# Patient Record
Sex: FEMALE | Race: WHITE | NOT HISPANIC OR LATINO | Employment: OTHER | URBAN - NONMETROPOLITAN AREA
[De-identification: names, ages, dates, MRNs, and addresses within clinical notes are randomized per-mention and may not be internally consistent; named-entity substitution may affect disease eponyms.]

---

## 2017-01-13 ENCOUNTER — HOSPITAL ENCOUNTER (OUTPATIENT)
Dept: GASTROENTEROLOGY | Facility: HOSPITAL | Age: 72
Setting detail: HOSPITAL OUTPATIENT SURGERY
Discharge: HOME | End: 2017-01-13
Attending: INTERNAL MEDICINE | Admitting: INTERNAL MEDICINE

## 2017-01-19 ENCOUNTER — OFFICE VISIT (OUTPATIENT)
Dept: GASTROENTEROLOGY | Facility: CLINIC | Age: 72
End: 2017-01-19

## 2017-01-19 VITALS
BODY MASS INDEX: 36.78 KG/M2 | HEART RATE: 84 BPM | DIASTOLIC BLOOD PRESSURE: 72 MMHG | HEIGHT: 67 IN | SYSTOLIC BLOOD PRESSURE: 150 MMHG | WEIGHT: 234.3 LBS

## 2017-01-19 DIAGNOSIS — R74.8 ELEVATED LIVER ENZYMES: ICD-10-CM

## 2017-01-19 DIAGNOSIS — E71.30 DISORDER OF FATTY-ACID METABOLISM: ICD-10-CM

## 2017-01-19 DIAGNOSIS — K76.0 FATTY (CHANGE OF) LIVER, NOT ELSEWHERE CLASSIFIED: ICD-10-CM

## 2017-01-19 DIAGNOSIS — K75.9 INFLAMMATORY LIVER DISEASE: ICD-10-CM

## 2017-01-19 DIAGNOSIS — K90.89 OTHER INTESTINAL MALABSORPTION: ICD-10-CM

## 2017-01-19 DIAGNOSIS — K21.00 GASTROESOPHAGEAL REFLUX DISEASE WITH ESOPHAGITIS: Primary | ICD-10-CM

## 2017-01-19 DIAGNOSIS — R19.7 DIARRHEA, UNSPECIFIED TYPE: ICD-10-CM

## 2017-01-19 LAB
CONVERTED (HISTORICAL) SPECIMEN DESCRIPTION 1: NORMAL
CONVERTED (HISTORICAL) SPECIMEN DESCRIPTION 2: NORMAL

## 2017-01-19 PROCEDURE — 99214 OFFICE O/P EST MOD 30 MIN: CPT | Performed by: PHYSICIAN ASSISTANT

## 2017-01-19 RX ORDER — OMEPRAZOLE 20 MG/1
20 TABLET, DELAYED RELEASE ORAL 2 TIMES DAILY
COMMUNITY
End: 2017-01-19

## 2017-01-20 RX ORDER — ESOMEPRAZOLE MAGNESIUM 40 MG/1
40 CAPSULE, DELAYED RELEASE ORAL DAILY
Qty: 30 CAPSULE | Refills: 5 | Status: SHIPPED | OUTPATIENT
Start: 2017-01-20 | End: 2017-02-14

## 2017-01-26 ENCOUNTER — PROCEDURE VISIT (OUTPATIENT)
Dept: FAMILY MEDICINE CLINIC | Facility: CLINIC | Age: 72
End: 2017-01-26

## 2017-01-26 VITALS
WEIGHT: 231 LBS | RESPIRATION RATE: 16 BRPM | SYSTOLIC BLOOD PRESSURE: 130 MMHG | BODY MASS INDEX: 36.26 KG/M2 | HEIGHT: 67 IN | DIASTOLIC BLOOD PRESSURE: 78 MMHG | HEART RATE: 85 BPM | TEMPERATURE: 98.6 F

## 2017-01-26 DIAGNOSIS — L91.8 SKIN TAG: Primary | ICD-10-CM

## 2017-01-26 PROCEDURE — 11200 RMVL SKIN TAGS UP TO&INC 15: CPT | Performed by: NURSE PRACTITIONER

## 2017-01-26 PROCEDURE — 99214 OFFICE O/P EST MOD 30 MIN: CPT | Performed by: NURSE PRACTITIONER

## 2017-01-26 RX ORDER — CITALOPRAM 40 MG/1
TABLET ORAL
Qty: 30 TABLET | Refills: 1 | Status: SHIPPED | OUTPATIENT
Start: 2017-01-26 | End: 2017-03-25 | Stop reason: SDUPTHER

## 2017-01-26 NOTE — MR AVS SNAPSHOT
Pita AGUIRRE Emry   1/26/2017 10:00 AM   Procedure visit    Dept Phone:  [phone]   Encounter #:  [id number]    Provider:  SHAHZAD Sanchez   Department:  [facility]                Your Full Care Plan              Where to Get Your Medications      These medications were sent to [address] [facility] [phone]  - [phone] [place] [address] [address], [facility] [address]     Phone:  [phone]     citalopram 40 MG tablet            Your Updated Medication List          This list is accurate as of: 1/26/17 12:04 PM.  Always use your most recent med list.                benzonatate 200 MG capsule   Commonly known as:  TESSALON       cetirizine-pseudoephedrine 5-120 MG per 12 hr tablet   Commonly known as:  ZyrTEC-D   Take 1 tablet by mouth 2 (Two) Times a Day.       citalopram 40 MG tablet   Commonly known as:  CeleXA   TAKE ONE TABLET BY MOUTH DAILY       CYCLOSPORINE OP       doxepin 25 MG capsule   Commonly known as:  SINEquan       esomeprazole 40 MG capsule   Commonly known as:  nexIUM   Take 1 capsule by mouth Daily.       fluticasone 50 MCG/BLIST diskus inhaler   Commonly known as:  FLOVENT DISKUS       hyoscyamine sulfate 0.125 MG tablet dispersible disintegrating tablet   Commonly known as:  ANASPAZ       IBUPROFEN PO       levothyroxine 100 MCG tablet   Commonly known as:  SYNTHROID, LEVOTHROID   TAKE ONE TABLET BY MOUTH DAILY       meloxicam 15 MG tablet   Commonly known as:  MOBIC   TAKE ONE TABLET BY MOUTH DAILY AS NEEDED FOR ARTHRITIS       ONDANSETRON HCL PO       pseudoephedrine 30 MG tablet   Commonly known as:  SUDAFED   Take 1 tablet by mouth 3 (three) times a day.       VENTOLIN  (90 BASE) MCG/ACT inhaler   Generic drug:  albuterol       vitamin D 58048 UNITS capsule capsule   Commonly known as:  ERGOCALCIFEROL               Instructions     None    "Patient Instructions History      Upcoming Appointments     Visit Type Date Time Department    IN OFFICE PROCEDURE 2017 10:00 AM [facility]    OFFICE VISIT 2017 11:20 AM [facility]    OFFICE VISIT 3/30/2017 11:00 AM [facility]      MyChart Signup     [facility] [employer] allows you to send messages to your doctor, view your test results, renew your prescriptions, schedule appointments, and more. To sign up, go to [employer] and click on the Sign Up Now link in the New User? box. Enter your [employer] Activation Code exactly as it appears below along with the last four digits of your Social Security Number and your Date of Birth () to complete the sign-up process. If you do not sign up before the expiration date, you must request a new code.    [employer] Activation Code: [email or web address]  Expires: 2017  2:51 PM    If you have questions, you can email [email or web address] or call [phone] to talk to our [employer] staff. Remember, [employer] is NOT to be used for urgent needs. For medical emergencies, dial 911.               Other Info from Your Visit           Your Appointments     2017 11:20 AM CST   Office Visit with Saulo Lemon PA-C   [facility] (--)    [address]  [facility] [address] [phone]           Arrive 15 minutes prior to appointment.            Mar 30, 2017 11:00 AM CDT   Office Visit with SHAHZAD Sanchez   [facility] (--)    [place] [address] [address] [address]  [facility] [address] [phone]           Arrive 15 minutes prior to appointment.              Allergies     Phenergan [Promethazine]        Reason for Visit     Skin Problem           Vital Signs     Blood Pressure Pulse Temperature Respirations Height Weight    130/78 85 98.6 °F (37 °C) 16 67\" (170.2 cm) 231 lb " (105 kg)    Body Mass Index Smoking Status                36.18 kg/m2 Former Smoker

## 2017-01-26 NOTE — PROGRESS NOTES
Subjective   Pita Hyatt is a 71 y.o. female.     Rash   This is a chronic (wants to have skin tags removed from her neck) problem. The current episode started more than 1 year ago. The problem has been waxing and waning (they tend to get caught on objects and will bleed) since onset. The affected locations include the neck. Rash characteristics: skin tags. She was exposed to nothing. Past treatments include nothing. The treatment provided no relief.        The following portions of the patient's history were reviewed and updated as appropriate: allergies, current medications, past family history, past medical history, past social history, past surgical history and problem list.    Review of Systems   Constitutional: Negative.    HENT: Negative.    Respiratory: Negative.    Cardiovascular: Negative.    Skin: Positive for rash.        3 small skin tags on her neck that need to be removed.   Neurological: Negative.    Psychiatric/Behavioral: Negative.        Objective   Physical Exam   Constitutional: She is oriented to person, place, and time. She appears well-developed and well-nourished. No distress.   HENT:   Head: Normocephalic.   Neck: Normal range of motion. Neck supple.   Cardiovascular: Normal rate, regular rhythm and normal heart sounds.  Exam reveals no friction rub.    No murmur heard.  Pulmonary/Chest: Effort normal and breath sounds normal. No respiratory distress. She has no wheezes. She has no rales.   Musculoskeletal: Normal range of motion.   Neurological: She is alert and oriented to person, place, and time.   Skin: Skin is warm and dry.   Has 3 small skin tags on the back of her neck.  There is no erythema or bleeding noted.   Psychiatric: She has a normal mood and affect. Thought content normal.   Nursing note and vitals reviewed.      Assessment/Plan   Georgia was seen today for skin problem.    Diagnoses and all orders for this visit:    Skin tag    Consent is signed and 3 small skin tags are  removed without diff.  She was informed to watch for s/s of infection, such as warmth, redness or drainage.  She should keep the area clean and dry for the next 2 or 3 days.

## 2017-01-27 RX ORDER — ERGOCALCIFEROL 1.25 MG/1
CAPSULE ORAL
Qty: 6 CAPSULE | Refills: 2 | Status: SHIPPED | OUTPATIENT
Start: 2017-01-27 | End: 2017-05-30 | Stop reason: SDUPTHER

## 2017-02-14 ENCOUNTER — OFFICE VISIT (OUTPATIENT)
Dept: GASTROENTEROLOGY | Facility: CLINIC | Age: 72
End: 2017-02-14

## 2017-02-14 VITALS
BODY MASS INDEX: 36.1 KG/M2 | HEART RATE: 93 BPM | DIASTOLIC BLOOD PRESSURE: 72 MMHG | WEIGHT: 230 LBS | SYSTOLIC BLOOD PRESSURE: 136 MMHG | HEIGHT: 67 IN

## 2017-02-14 DIAGNOSIS — R74.02 NONSPECIFIC ELEVATION OF LEVELS OF TRANSAMINASE AND LACTIC ACID DEHYDROGENASE (LDH): ICD-10-CM

## 2017-02-14 DIAGNOSIS — K76.0 FATTY (CHANGE OF) LIVER, NOT ELSEWHERE CLASSIFIED: ICD-10-CM

## 2017-02-14 DIAGNOSIS — K21.00 GASTROESOPHAGEAL REFLUX DISEASE WITH ESOPHAGITIS: Primary | ICD-10-CM

## 2017-02-14 DIAGNOSIS — R74.8 ELEVATED LIVER ENZYMES: ICD-10-CM

## 2017-02-14 DIAGNOSIS — R74.01 NONSPECIFIC ELEVATION OF LEVELS OF TRANSAMINASE AND LACTIC ACID DEHYDROGENASE (LDH): ICD-10-CM

## 2017-02-14 DIAGNOSIS — E71.30 DISORDER OF FATTY-ACID METABOLISM: ICD-10-CM

## 2017-02-14 PROCEDURE — 99213 OFFICE O/P EST LOW 20 MIN: CPT | Performed by: PHYSICIAN ASSISTANT

## 2017-02-14 RX ORDER — CHOLECALCIFEROL (VITAMIN D3) 50 MCG
20.6 CAPSULE ORAL DAILY
COMMUNITY
End: 2018-04-11

## 2017-02-14 NOTE — PROGRESS NOTES
Chief Complaint   Patient presents with   • Elevated Hepatic Enzymes   • Nausea   • Vomiting   • Diarrhea       ENDO PROCEDURE ORDERED:    Subjective    Pita Hyatt is a 71 y.o. female. she is here today for follow-up.    History of Present Illness    Patient seen on a recheck of her elevated liver enzymes, nausea, vomiting, diarrhea.  Last seen 1/19/17.  Patient again did not get her laboratories drawn, she claims again that she did not know she needed to.  I have again reminded her the initial reason to see her was because of abnormal liver enzymes.  She has been taking Nexium 40 mg daily as well as an over-the-counter Prilosec and states she's been doing much better.  She denies nausea, vomiting, dysphagia.  Bowels are moving without blood.  Weight is down 4.5 pounds since last visit.  Last EGD/colonoscopy showed esophagitis, gastritis, diverticulosis on 1/13/17.    A/P: GERD, abdominal pain patient will continue current regimen.  I recommended follow-up in 6 weeks, have again requested she have her laboratories drawn as recommended.  It is still not clear if she will willingly do so.  Further pending clinical course and the results of the above.     The following portions of the patient's history were reviewed and updated as appropriate:   Past Medical History   Diagnosis Date   • Acquired hypothyroidism    • Acute bronchitis    • Acute gastritis    • Acute maxillary sinusitis    • Acute maxillary sinusitis, unspecified    • Acute serous otitis media      R   • Acute sinusitis      Could be R mastoid    • Allergic rhinitis    • Anxiety state    • Chronic sinusitis    • Conjunctivitis    • Cough    • Degenerative joint disease of shoulder region    • Depressive disorder      improving   • Diarrhea    • Dysfunction of eustachian tube    • Epigastric pain    • Essential hypertension    • Female stress incontinence    • Forgetfulness    • Functional diarrhea    • Gynecological Examination    • Knee joint replaced  by other means    • Malaise and fatigue    • Menopause    • Nausea and vomiting    • Other Chest pain    • Polyp of colon    • Preop examination, unspecified    • Rhinitis medicamentosa    • Shoulder pain    • Skin tag- removal    • Subclinical hypothyroidism      Past Surgical History   Procedure Laterality Date   • Injection of medication       celestone (betamethasone) 12mg   • Endoscopy and colonoscopy       Diverticulum in sigmoid colon. 2 polyps in ascending colon & descending colon; removed by snare cautery polypectomy   • Injection of medication       Depo Medrol 2ml   • Injection of medication       Rocephin 1gm   • Replacement total knee     • Hysterectomy     • Colonoscopy  07/16/2012   • Colonoscopy  01/13/2017   • Upper gastrointestinal endoscopy  07/16/2012   • Upper gastrointestinal endoscopy  01/13/2017     Family History   Problem Relation Age of Onset   • Hypertension Other    • Stroke Other    • Diabetes Other    • Ulcers Other    • Cancer Other      OB History     No data available        Allergies   Allergen Reactions   • Phenergan [Promethazine]      Social History     Social History   • Marital status:      Spouse name: N/A   • Number of children: N/A   • Years of education: N/A     Social History Main Topics   • Smoking status: Former Smoker   • Smokeless tobacco: Never Used   • Alcohol use No   • Drug use: No   • Sexual activity: Not Asked     Other Topics Concern   • None     Social History Narrative       Current Outpatient Prescriptions:   •  albuterol (VENTOLIN HFA) 108 (90 BASE) MCG/ACT inhaler, Inhale 1 puff every 4 (four) hours as needed for wheezing., Disp: , Rfl:   •  benzonatate (TESSALON) 200 MG capsule, Take 200 mg by mouth 3 (three) times a day as needed for cough., Disp: , Rfl:   •  citalopram (CeleXA) 40 MG tablet, TAKE ONE TABLET BY MOUTH DAILY, Disp: 30 tablet, Rfl: 1  •  CYCLOSPORINE OP, Apply 1 drop to eye 3 (three) times a day as needed., Disp: , Rfl:   •  doxepin  "(SINEquan) 25 MG capsule, Take 25 mg by mouth 3 (three) times a day as needed for sleep., Disp: , Rfl:   •  fluticasone (FLOVENT DISKUS) 50 MCG/BLIST diskus inhaler, Inhale 2 puffs 1 (one) time daily., Disp: , Rfl:   •  hyoscyamine sulfate (ANASPAZ) 0.125 MG tablet dispersible disintegrating tablet, Take 125 mcg by mouth every 4 (four) hours as needed., Disp: , Rfl:   •  IBUPROFEN PO, Take  by mouth every night., Disp: , Rfl:   •  meloxicam (MOBIC) 15 MG tablet, TAKE ONE TABLET BY MOUTH DAILY AS NEEDED FOR ARTHRITIS, Disp: 30 tablet, Rfl: 2  •  Omeprazole Magnesium 20.6 (20 BASE) MG capsule delayed-release, Take 20.6 mg by mouth Daily., Disp: , Rfl:   •  ONDANSETRON HCL PO, Take 1 tablet by mouth every 4 (four) hours., Disp: , Rfl:   •  pseudoephedrine (SUDAFED) 30 MG tablet, Take 1 tablet by mouth 3 (three) times a day., Disp: 30 tablet, Rfl: 2  •  vitamin D (ERGOCALCIFEROL) 26032 UNITS capsule capsule, TAKE ONE CAPSULE BY MOUTH ONCE WEEKLY, Disp: 6 capsule, Rfl: 2  •  cetirizine-pseudoephedrine (ZyrTEC-D) 5-120 MG per 12 hr tablet, TAKE ONE TABLET BY MOUTH TWICE A DAY, Disp: 60 tablet, Rfl: 3  •  levothyroxine (SYNTHROID, LEVOTHROID) 100 MCG tablet, TAKE ONE TABLET BY MOUTH DAILY, Disp: 30 tablet, Rfl: 1  Review of Systems  Review of Systems       Objective      Visit Vitals   • /72 (BP Location: Left arm)   • Pulse 93   • Ht 67\" (170.2 cm)   • Wt 230 lb (104 kg)   • BMI 36.02 kg/m2     Physical Exam   Constitutional: She is oriented to person, place, and time. She appears well-developed and well-nourished. No distress.   HENT:   Head: Normocephalic and atraumatic.   Eyes: EOM are normal. Pupils are equal, round, and reactive to light.   Neck: Normal range of motion.   Cardiovascular: Normal rate, regular rhythm and normal heart sounds.    Pulmonary/Chest: Effort normal and breath sounds normal.   Abdominal: Soft. Bowel sounds are normal. She exhibits no shifting dullness, no distension, no abdominal bruit, " no ascites and no mass. There is no hepatosplenomegaly. There is tenderness. There is no rigidity, no rebound, no guarding and no CVA tenderness. No hernia. Hernia confirmed negative in the ventral area.   obese   Musculoskeletal: Normal range of motion.   Neurological: She is alert and oriented to person, place, and time.   Skin: Skin is warm and dry.   Psychiatric: She has a normal mood and affect. Her behavior is normal. Judgment and thought content normal.   Nursing note and vitals reviewed.    Hospital Outpatient Visit on 01/13/2017   Component Date Value Ref Range Status   • Spec Descr 1 01/13/2017 SPECIMEN(S): A ANTRAL BIOPSY   Final   • Specimen description 2 01/13/2017 SPECIMEN(S): B ESOPHAGEAL BIOPSY   Final     Assessment/Plan      1. Gastroesophageal reflux disease with esophagitis    2. Elevated liver enzymes    3. Fatty (change of) liver, not elsewhere classified     4. Disorder of fatty-acid metabolism     5. Nonspecific elevation of levels of transaminase and lactic acid dehydrogenase (LDH)     .   Georgia was seen today for elevated hepatic enzymes, nausea, vomiting and diarrhea.    Diagnoses and all orders for this visit:    Gastroesophageal reflux disease with esophagitis    Elevated liver enzymes  -     Hepatic Function Panel  -     LUCAS Fibrosure  -     Protime-INR  -     Hepatitis Panel, Acute    Fatty (change of) liver, not elsewhere classified   -     Hepatic Function Panel  -     LUCAS Fibrosure  -     Protime-INR  -     Hepatitis Panel, Acute    Disorder of fatty-acid metabolism   -     LUCAS Fibrosure    Nonspecific elevation of levels of transaminase and lactic acid dehydrogenase (LDH)   -     Hepatitis Panel, Acute        Orders placed during this encounter include:  Orders Placed This Encounter   Procedures   • Hepatic Function Panel   • LUCAS Fibrosure   • Protime-INR   • Hepatitis Panel, Acute       Medications prescribed:  No orders of the defined types were placed in this  encounter.    Discontinued Medications       Reason for Discontinue    esomeprazole (nexIUM) 40 MG capsule Cost of medication        Requested Prescriptions      No prescriptions requested or ordered in this encounter       Review and/or summary of lab tests, radiology, procedures, medications. Review and summary of old records and obtaining of history. The risks and benefits of my recommendations, as well as other treatment options were discussed with the patient today. Questions were answered.    Follow-up: Return in about 6 weeks (around 3/28/2017) for fasting labs prior.           This document has been electronically signed by Saulo Lemon PA-C on February 27, 2017 6:17 PM      Results for orders placed or performed during the hospital encounter of 01/13/17   Converted Surgical Pathology   Result Value Ref Range    Spec Descr 1 SPECIMEN(S): A ANTRAL BIOPSY     Specimen description 2 SPECIMEN(S): B ESOPHAGEAL BIOPSY    Results for orders placed or performed during the hospital encounter of 07/25/16   Iron profile   Result Value Ref Range    Iron 81 37 - 170 ug/dl    TIBC 357 265 - 497 ug/dl    Iron Saturation 22.7 15.0 - 50.0 %   Vitamin D 25 hydroxy   Result Value Ref Range    25 Hydroxy, Vitamin D 17.0 (L) 30.0 - 100.0 ng/ml   TSH   Result Value Ref Range    TSH 2.40 0.46 - 4.68 uIU/ml   Vitamin B12   Result Value Ref Range    Vitamin B-12 464 239 - 931 pg/ml   Comprehensive metabolic panel   Result Value Ref Range    Sodium 137 137 - 145 mmol/L    Potassium 4.4 3.5 - 5.1 mmol/L    Chloride 103 95 - 110 mmol/L    CO2 22 22 - 31 mmol/L    Anion Gap 12.0 5.0 - 15.0 mmol/L    Glucose 93 60 - 100 mg/dl    BUN 14 7 - 21 mg/dl    Creatinine 0.8 0.5 - 1.0 mg/dl    GFR MDRD Non African American 70 39 - 90 mL/min/1.73 sq.M    GFR MDRD African American 85 39 - 90 mL/min/1.73 sq.M    Calcium 9.4 8.4 - 10.2 mg/dl    Total Protein 6.4 6.3 - 8.6 gm/dl    Albumin 3.3 (L) 3.4 - 4.8 gm/dl    Total Bilirubin 0.7 0.2 - 1.3  mg/dl    Alkaline Phosphatase 112 38 - 126 U/L    AST (SGOT) 65 (H) 14 - 36 U/L    ALT (SGPT) 54 (H) 9 - 52 U/L   Results for orders placed or performed during the hospital encounter of 11/13/15   Vitamin D 25 hydroxy   Result Value Ref Range    25 Hydroxy, Vitamin D 28.5 (L) 30.0 - 100.0 ng/ml   TSH   Result Value Ref Range    TSH 1.45 0.46 - 4.68 uIU/ml   Vitamin B12   Result Value Ref Range    Vitamin B-12 459 239 - 931 pg/ml   Results for orders placed or performed in visit on 01/23/15   Allergens, Zone 5   Result Value Ref Range    D. farinae (dust mite) <0.10 <=0.34 kU/L    D. pteronyssinus (dust mite) <0.10 <=0.34 kU/L    IMMUCAP Score See Note     Bermuda Grass <0.10 <=0.34 kU/L    Portillo Grass <0.10 <=0.34 kU/L    IgE 91 <=214 kU/L    Alternaria tenuis <0.10 <=0.34 kU/L    Aspergillus fumigatus <0.10 <=0.34 kU/L    Hormodendrum hordei <0.10 <=0.34 kU/L    Penicillium chrysogen <0.10 <=0.34 kU/L    Birch <0.10 <=0.34 kU/L    [place], Pollen <0.10 <=0.34 kU/L    Maple/Box Elder <0.10 <=0.34 kU/L    Cottonseed <0.10 <=0.34 kU/L    Elm, American <0.10 <=0.34 kU/L    Culberson, Mountain <0.10 <=0.34 kU/L    Cord Tree <0.10 <=0.34 kU/L    Seagraves, White <0.10 <=0.34 kU/L    Ojnathan, White <0.10 <=0.34 kU/L    Pigweed, Rough/Common <0.10 <=0.34 kU/L    Russian Thistle <0.10 <=0.34 kU/L    Ragweed, Common/Short <0.10 <=0.34 kU/L    Cat Epithellium <0.10 <=0.34 kU/L    Dog Dander, IgE <0.10 <=0.34 kU/L    Pecan/Madison Tree <0.10 <=0.34 kU/L    Cockroach,Welsh <0.10 <=0.34 kU/L    Milk, Cow's <0.10 <=0.34 kU/L    Peanut <0.10 <=0.34 kU/L    Mouse Epithelial <0.10 <=0.34 kU/L    Mucor racemosus <0.10 <=0.34 kU/L    White Webster <0.10 <=0.34 kU/L    Sheep Sorrel <0.10 <=0.34 kU/L   Results for orders placed or performed in visit on 01/09/15   Rheumatoid factor   Result Value Ref Range    Rheumatoid Factor Quantitative Negative Negative   Results for orders placed or performed in visit on 01/09/15   TSH   Result Value Ref  Range    TSH 0.80 0.46 - 4.68 uIU/ml   Results for orders placed or performed in visit on 11/03/14   Hemoglobin A1c   Result Value Ref Range    Hemoglobin A1C 5.3 4.0 - 5.6 %TotHgb   Glucose, random   Result Value Ref Range    Glucose 118 (H) 60 - 100 mg/dl   Results for orders placed or performed in visit on 09/19/14   CBC and Differential   Result Value Ref Range    WBC 4.4 3.2 - 9.8 x1000/uL    RBC 4.29 3.77 - 5.16 bianka/mm3    Hemoglobin 13.0 12.0 - 15.5 gm/dl    Hematocrit 39.9 35.0 - 45.0 %    MCV 93.0 80.0 - 98.0 fl    MCH 30.3 26.0 - 34.0 pg    MCHC 32.6 31.4 - 36.0 gm/dl    RDW 13.7 11.5 - 14.5 %    Platelets 159 150 - 450 x1000/mm3    MPV 10.9 8.0 - 12.0 fl    Neutrophil Rel % 45.4 37.0 - 80.0 %    Lymphocyte Rel % 38.3 10.0 - 50.0 %    Monocyte Rel % 10.9 0.0 - 12.0 %    Eosinophil Rel % 4.3 0.0 - 7.0 %    Basophil Rel % 0.9 0.0 - 2.0 %    Immature Granulocyte Rel % 0.20 0.00 - 0.50 %    Neutrophils Absolute 1.99 (L) 2.00 - 8.60 x1000/uL    Lymphocytes Absolute 1.68 0.60 - 4.20 x1000/uL    Monocytes Absolute 0.48 0.00 - 0.90 x1000/uL    Eosinophils Absolute 0.19 0.00 - 0.70 x1000/uL    Basophils Absolute 0.04 0.00 - 0.20 x1000/uL    Immature Granulocytes Absolute 0.010 0.005 - 0.022 x1000/uL   Comprehensive metabolic panel   Result Value Ref Range    Sodium 144 137 - 145 mmol/L    Potassium 4.6 3.5 - 5.1 mmol/L    Chloride 103 95 - 110 mmol/L    CO2 29 22 - 31 mmol/L    Anion Gap 12.0 5.0 - 15.0 mmol/L    Glucose 114 (H) 60 - 100 mg/dl    BUN 18 7 - 21 mg/dl    Creatinine 0.9 0.5 - 1.0 mg/dl    GFR MDRD Non  62 39 - 90 mL/min/1.73 sq.M    GFR MDRD African American 75 39 - 90 mL/min/1.73 sq.M    Calcium 9.7 8.4 - 10.2 mg/dl    Total Protein 6.7 6.3 - 8.6 gm/dl    Albumin 3.7 3.4 - 4.8 gm/dl    Total Bilirubin 0.8 0.2 - 1.3 mg/dl    Alkaline Phosphatase 98 38 - 126 U/L    AST (SGOT) 60 (H) 14 - 36 U/L    ALT (SGPT) 53 (H) 9 - 52 U/L     *Note: Due to a large number of results and/or  encounters for the requested time period, some results have not been displayed. A complete set of results can be found in Results Review.       Some portions of this note have been dictated using voice recognition software and may contain errors and/or omissions.

## 2017-02-21 DIAGNOSIS — J30.9 ALLERGIC RHINITIS, UNSPECIFIED ALLERGIC RHINITIS TRIGGER, UNSPECIFIED RHINITIS SEASONALITY: ICD-10-CM

## 2017-02-21 RX ORDER — CETIRIZINE HYDROCHLORIDE, PSEUDOEPHEDRINE HYDROCHLORIDE 5; 120 MG/1; MG/1
TABLET, FILM COATED, EXTENDED RELEASE ORAL
Qty: 60 TABLET | Refills: 3 | Status: SHIPPED | OUTPATIENT
Start: 2017-02-21 | End: 2017-09-20 | Stop reason: SDUPTHER

## 2017-02-27 RX ORDER — LEVOTHYROXINE SODIUM 0.1 MG/1
TABLET ORAL
Qty: 30 TABLET | Refills: 1 | Status: SHIPPED | OUTPATIENT
Start: 2017-02-27 | End: 2017-05-06 | Stop reason: SDUPTHER

## 2017-03-27 RX ORDER — CITALOPRAM 40 MG/1
TABLET ORAL
Qty: 30 TABLET | Refills: 0 | Status: SHIPPED | OUTPATIENT
Start: 2017-03-27 | End: 2017-05-06 | Stop reason: SDUPTHER

## 2017-03-27 RX ORDER — MELOXICAM 15 MG/1
TABLET ORAL
Qty: 30 TABLET | Refills: 1 | Status: SHIPPED | OUTPATIENT
Start: 2017-03-27 | End: 2017-06-14 | Stop reason: SDUPTHER

## 2017-04-11 LAB
ALBUMIN SERPL-MCNC: 3.4 G/DL (ref 3.4–4.8)
ALP SERPL-CCNC: 124 U/L (ref 38–126)
ALT SERPL W P-5'-P-CCNC: 91 U/L (ref 9–52)
AST SERPL-CCNC: 118 U/L (ref 14–36)
BILIRUB CONJ SERPL-MCNC: 0 MG/DL (ref 0–0.3)
BILIRUB INDIRECT SERPL-MCNC: 0.3 MG/DL (ref 0–1.1)
BILIRUB SERPL-MCNC: 0.7 MG/DL (ref 0.2–1.3)
PROT SERPL-MCNC: 6.2 G/DL (ref 6.3–8.6)

## 2017-04-11 PROCEDURE — 85610 PROTHROMBIN TIME: CPT | Performed by: PHYSICIAN ASSISTANT

## 2017-04-11 PROCEDURE — 82247 BILIRUBIN TOTAL: CPT | Performed by: PHYSICIAN ASSISTANT

## 2017-04-11 PROCEDURE — 82465 ASSAY BLD/SERUM CHOLESTEROL: CPT | Performed by: PHYSICIAN ASSISTANT

## 2017-04-11 PROCEDURE — 84478 ASSAY OF TRIGLYCERIDES: CPT | Performed by: PHYSICIAN ASSISTANT

## 2017-04-11 PROCEDURE — 80076 HEPATIC FUNCTION PANEL: CPT | Performed by: PHYSICIAN ASSISTANT

## 2017-04-11 PROCEDURE — 84460 ALANINE AMINO (ALT) (SGPT): CPT | Performed by: PHYSICIAN ASSISTANT

## 2017-04-11 PROCEDURE — 80074 ACUTE HEPATITIS PANEL: CPT | Performed by: PHYSICIAN ASSISTANT

## 2017-04-11 PROCEDURE — 83010 ASSAY OF HAPTOGLOBIN QUANT: CPT | Performed by: PHYSICIAN ASSISTANT

## 2017-04-11 PROCEDURE — 82977 ASSAY OF GGT: CPT | Performed by: PHYSICIAN ASSISTANT

## 2017-04-11 PROCEDURE — 82947 ASSAY GLUCOSE BLOOD QUANT: CPT | Performed by: PHYSICIAN ASSISTANT

## 2017-04-11 PROCEDURE — 83883 ASSAY NEPHELOMETRY NOT SPEC: CPT | Performed by: PHYSICIAN ASSISTANT

## 2017-04-11 PROCEDURE — 84450 TRANSFERASE (AST) (SGOT): CPT | Performed by: PHYSICIAN ASSISTANT

## 2017-04-12 LAB
INR PPP: 1.15 (ref 0.8–1.2)
PROTHROMBIN TIME: 14.7 SECONDS (ref 11.1–15.3)

## 2017-04-14 LAB
A2 MACROGLOB SERPL-MCNC: 234 MG/DL (ref 110–276)
ALT SERPL W P-5'-P-CCNC: 88 IU/L (ref 0–40)
APO A-I SERPL-MCNC: 131 MG/DL (ref 116–209)
AST SERPL W P-5'-P-CCNC: 128 IU/L (ref 0–40)
BILIRUB SERPL-MCNC: 0.5 MG/DL (ref 0–1.2)
CHOLEST SERPL-MCNC: 175 MG/DL (ref 100–199)
FIBROSIS SCORING:: ABNORMAL
FIBROSIS STAGE SERPL QL: ABNORMAL
GGT SERPL-CCNC: 160 IU/L (ref 0–60)
GLUCOSE SERPL-MCNC: 124 MG/DL (ref 65–99)
HAPTOGLOB SERPL-MCNC: 131 MG/DL (ref 34–200)
HAV IGM SERPL QL IA: NEGATIVE
HBV CORE IGM SERPL QL IA: NEGATIVE
HBV SURFACE AG SERPL QL IA: NEGATIVE
HCV AB SER DONR QL: NEGATIVE
INTERPRETATIONS: (REFERENCE): ABNORMAL
LABORATORY COMMENT REPORT: ABNORMAL
LIMITATIONS: (REFERENCE): ABNORMAL
LIVER FIBR SCORE SERPL CALC.FIBROSURE: 0.58 (ref 0–0.21)
NASH GRADE (REFERENCE): ABNORMAL
NASH SCORE (REFERENCE): 0.5
NASH SCORING (REFERENCE): ABNORMAL
STEATOSIS GRADE (REFERENCE): ABNORMAL
STEATOSIS GRADING (REFERENCE): ABNORMAL
STEATOSIS SCORE (REFERENCE): 0.94 (ref 0–0.3)
TRIGL SERPL-MCNC: 116 MG/DL (ref 0–149)
WEIGHT: (REFERENCE): 230 LBS

## 2017-04-18 ENCOUNTER — OFFICE VISIT (OUTPATIENT)
Dept: FAMILY MEDICINE CLINIC | Facility: CLINIC | Age: 72
End: 2017-04-18

## 2017-04-18 VITALS
HEIGHT: 67 IN | TEMPERATURE: 97.7 F | HEART RATE: 78 BPM | RESPIRATION RATE: 18 BRPM | WEIGHT: 234.8 LBS | SYSTOLIC BLOOD PRESSURE: 164 MMHG | OXYGEN SATURATION: 98 % | DIASTOLIC BLOOD PRESSURE: 92 MMHG | BODY MASS INDEX: 36.85 KG/M2

## 2017-04-18 DIAGNOSIS — J01.01 ACUTE RECURRENT MAXILLARY SINUSITIS: Primary | ICD-10-CM

## 2017-04-18 DIAGNOSIS — N39.3 STRESS INCONTINENCE: ICD-10-CM

## 2017-04-18 PROCEDURE — 99214 OFFICE O/P EST MOD 30 MIN: CPT | Performed by: NURSE PRACTITIONER

## 2017-04-18 RX ORDER — CEFUROXIME AXETIL 500 MG/1
500 TABLET ORAL 2 TIMES DAILY
Qty: 20 TABLET | Refills: 0 | Status: SHIPPED | OUTPATIENT
Start: 2017-04-18 | End: 2017-06-06

## 2017-04-18 NOTE — PROGRESS NOTES
Subjective   Pita Hyatt is a 71 y.o. female.     HPI Comments: Here today with a sinus infection she has had for almost a week.  She has had discolored nasal drainage and more pain on the left that the right.  Has taken otc decongestant only.    She is also c/o urinary freq that is becoming a problem.  She says she has to wear a pad due to the incontinence and she is up about every 2 hrs through the night to urinate.  This is interfering with her quality of life.    Sinusitis   This is a recurrent problem. The current episode started in the past 7 days. The problem has been waxing and waning since onset. There has been no fever. Her pain is at a severity of 4/10. The pain is moderate. Associated symptoms include congestion, headaches, a hoarse voice and sinus pressure. Pertinent negatives include no chills, coughing, diaphoresis, ear pain, neck pain, shortness of breath, sneezing, sore throat or swollen glands. Past treatments include oral decongestants. The treatment provided mild relief.   Urinary Frequency    This is a chronic problem. The current episode started more than 1 month ago. The problem occurs every urination. The problem has been waxing and waning. Quality: has no pain. The patient is experiencing no pain. There has been no fever. She is not sexually active. There is no history of pyelonephritis. Associated symptoms include frequency. Pertinent negatives include no chills, discharge, flank pain, hematuria, hesitancy, nausea, possible pregnancy, sweats, urgency or vomiting. She has tried nothing for the symptoms. The treatment provided no relief. Her past medical history is significant for a urological procedure. There is no history of catheterization, kidney stones, recurrent UTIs, a single kidney or urinary stasis.        The following portions of the patient's history were reviewed and updated as appropriate: allergies, current medications, past family history, past medical history, past social  history, past surgical history and problem list.    Review of Systems   Constitutional: Negative for chills and diaphoresis.   HENT: Positive for congestion, hoarse voice and sinus pressure. Negative for ear pain, sneezing and sore throat.    Respiratory: Negative.  Negative for cough and shortness of breath.    Cardiovascular: Negative.    Gastrointestinal: Negative for nausea and vomiting.   Genitourinary: Positive for frequency. Negative for flank pain, hematuria, hesitancy and urgency.   Musculoskeletal: Negative.  Negative for neck pain.   Skin: Negative.    Neurological: Positive for headaches.   Psychiatric/Behavioral: Negative.        Objective   Physical Exam   Constitutional: She is oriented to person, place, and time. She appears well-developed and well-nourished. No distress.   HENT:   Head: Normocephalic.   Right Ear: Hearing, tympanic membrane, external ear and ear canal normal. Tympanic membrane is not injected.   Left Ear: Hearing, tympanic membrane, external ear and ear canal normal. Tympanic membrane is not injected.   Nose: Rhinorrhea and sinus tenderness present. Right sinus exhibits maxillary sinus tenderness. Right sinus exhibits no frontal sinus tenderness. Left sinus exhibits maxillary sinus tenderness. Left sinus exhibits no frontal sinus tenderness.   Mouth/Throat: Oropharyngeal exudate present.   Eyes: Pupils are equal, round, and reactive to light.   Neck: Normal range of motion. Neck supple. No thyromegaly present.   Cardiovascular: Normal rate, regular rhythm and normal heart sounds.  Exam reveals no friction rub.    No murmur heard.  Pulmonary/Chest: Effort normal and breath sounds normal. No respiratory distress. She has no wheezes. She has no rales.   Abdominal: Soft.   Musculoskeletal: Normal range of motion.   Neurological: She is alert and oriented to person, place, and time.   Skin: Skin is warm and dry.   Psychiatric: She has a normal mood and affect. Thought content normal.    Nursing note and vitals reviewed.      Assessment/Plan   Georgia was seen today for follow-up.    Diagnoses and all orders for this visit:    Acute recurrent maxillary sinusitis  -     cefuroxime (CEFTIN) 500 MG tablet; Take 1 tablet by mouth 2 (Two) Times a Day.    Stress incontinence      She wants to be referred to urology for the stress incont.  Also she has zyrtec d at home that she will take along with the ceftin.

## 2017-05-09 RX ORDER — LEVOTHYROXINE SODIUM 0.1 MG/1
TABLET ORAL
Qty: 30 TABLET | Refills: 0 | Status: SHIPPED | OUTPATIENT
Start: 2017-05-09 | End: 2017-06-14 | Stop reason: SDUPTHER

## 2017-05-09 RX ORDER — CITALOPRAM 40 MG/1
TABLET ORAL
Qty: 30 TABLET | Refills: 0 | Status: SHIPPED | OUTPATIENT
Start: 2017-05-09 | End: 2017-06-14 | Stop reason: SDUPTHER

## 2017-05-30 RX ORDER — ERGOCALCIFEROL 1.25 MG/1
CAPSULE ORAL
Qty: 6 CAPSULE | Refills: 1 | Status: SHIPPED | OUTPATIENT
Start: 2017-05-30 | End: 2017-08-26 | Stop reason: SDUPTHER

## 2017-06-06 ENCOUNTER — OFFICE VISIT (OUTPATIENT)
Dept: GASTROENTEROLOGY | Facility: CLINIC | Age: 72
End: 2017-06-06

## 2017-06-06 VITALS
BODY MASS INDEX: 35.63 KG/M2 | DIASTOLIC BLOOD PRESSURE: 72 MMHG | HEIGHT: 67 IN | WEIGHT: 227 LBS | HEART RATE: 88 BPM | SYSTOLIC BLOOD PRESSURE: 118 MMHG

## 2017-06-06 DIAGNOSIS — K74.00 HEPATIC FIBROSIS: ICD-10-CM

## 2017-06-06 DIAGNOSIS — R74.8 ELEVATED LIVER ENZYMES: Primary | ICD-10-CM

## 2017-06-06 DIAGNOSIS — K21.00 GASTROESOPHAGEAL REFLUX DISEASE WITH ESOPHAGITIS: ICD-10-CM

## 2017-06-06 DIAGNOSIS — E74.89 OTHER SPECIFIED DISORDERS OF CARBOHYDRATE METABOLISM (HCC): ICD-10-CM

## 2017-06-06 PROCEDURE — 99213 OFFICE O/P EST LOW 20 MIN: CPT | Performed by: PHYSICIAN ASSISTANT

## 2017-06-06 NOTE — PROGRESS NOTES
Chief Complaint   Patient presents with   • Heartburn   • Elevated Hepatic Enzymes   • Fatty Liver       ENDO PROCEDURE ORDERED:    Kasia Hyatt is a 72 y.o. female. she is here today for follow-up.    History of Present Illness    Patient seen on a recheck of her GERD, elevated liver enzymes, fatty liver.  Last seen 2/14/17.  Patient states she's doing well with the over-the-counter Prilosec.  She denied nausea, vomiting, dysphagia.  She is on Levsin for her IBS symptoms.  Weight is down 3 pounds since last visit.  Last EGD/colonoscopy on 1/13/17 showed esophagitis, gastritis, diverticulosis.    Laboratory on for/11/17 showed negative hepatitis diagnostic panel.  INR 1.15.  Richard fibro-sure 0.58/F3, steatosis 0.94/S3, 0.25/N1.  Within the panel GGT was 160, , ALT 88, glucose 124.  LFT showed a protein 6.2, , ALT 91, otherwise normal.    A/P: Persistent elevation in liver enzymes secondary to fatty liver.  Encouraged continued dietary modification and significant weight loss attempts.  I am concerned about her elevated blood sugars, she may be developing diabetes.  We'll plan follow-up in 3 months with LFTs, INR, GGT, A1c.  Otherwise she'll continue on the Prilosec and Levsin for her GERD and IBS symptoms.  We'll need to be reviewed her record, I could not find any imaging of her liver.     The following portions of the patient's history were reviewed and updated as appropriate:   Past Medical History:   Diagnosis Date   • Acquired hypothyroidism    • Acute bronchitis    • Acute gastritis    • Acute maxillary sinusitis    • Acute maxillary sinusitis, unspecified    • Acute serous otitis media     R   • Acute sinusitis     Could be R mastoid    • Allergic rhinitis    • Anxiety state    • Chronic sinusitis    • Conjunctivitis    • Cough    • Degenerative joint disease of shoulder region    • Depressive disorder     improving   • Diarrhea    • Dysfunction of eustachian tube    • Epigastric  pain    • Essential hypertension    • Female stress incontinence    • Forgetfulness    • Functional diarrhea    • Gynecological Examination    • Knee joint replaced by other means    • Malaise and fatigue    • Menopause    • Nausea and vomiting    • Other Chest pain    • Polyp of colon    • Preop examination, unspecified    • Rhinitis medicamentosa    • Shoulder pain    • Skin tag- removal    • Subclinical hypothyroidism      Past Surgical History:   Procedure Laterality Date   • COLONOSCOPY  07/16/2012   • COLONOSCOPY  01/13/2017   • ENDOSCOPY AND COLONOSCOPY      Diverticulum in sigmoid colon. 2 polyps in ascending colon & descending colon; removed by snare cautery polypectomy   • HYSTERECTOMY     • INJECTION OF MEDICATION      celestone (betamethasone) 12mg   • INJECTION OF MEDICATION      Depo Medrol 2ml   • INJECTION OF MEDICATION      Rocephin 1gm   • REPLACEMENT TOTAL KNEE     • UPPER GASTROINTESTINAL ENDOSCOPY  07/16/2012   • UPPER GASTROINTESTINAL ENDOSCOPY  01/13/2017     Family History   Problem Relation Age of Onset   • Hypertension Other    • Stroke Other    • Diabetes Other    • Ulcers Other    • Cancer Other      OB History     No data available        Allergies   Allergen Reactions   • Phenergan [Promethazine]      Social History     Social History   • Marital status:      Spouse name: N/A   • Number of children: N/A   • Years of education: N/A     Social History Main Topics   • Smoking status: Former Smoker   • Smokeless tobacco: Never Used   • Alcohol use No   • Drug use: No   • Sexual activity: Not Asked     Other Topics Concern   • None     Social History Narrative       Current Outpatient Prescriptions:   •  albuterol (VENTOLIN HFA) 108 (90 BASE) MCG/ACT inhaler, Inhale 1 puff every 4 (four) hours as needed for wheezing., Disp: , Rfl:   •  cetirizine-pseudoephedrine (ZyrTEC-D) 5-120 MG per 12 hr tablet, TAKE ONE TABLET BY MOUTH TWICE A DAY, Disp: 60 tablet, Rfl: 3  •  CYCLOSPORINE OP,  "Apply 1 drop to eye 3 (three) times a day as needed., Disp: , Rfl:   •  doxepin (SINEquan) 25 MG capsule, Take 25 mg by mouth 3 (three) times a day as needed for sleep., Disp: , Rfl:   •  fluticasone (FLOVENT DISKUS) 50 MCG/BLIST diskus inhaler, Inhale 2 puffs 1 (one) time daily., Disp: , Rfl:   •  hyoscyamine sulfate (ANASPAZ) 0.125 MG tablet dispersible disintegrating tablet, Take 125 mcg by mouth every 4 (four) hours as needed., Disp: , Rfl:   •  IBUPROFEN PO, Take  by mouth every night., Disp: , Rfl:   •  Omeprazole Magnesium 20.6 (20 BASE) MG capsule delayed-release, Take 20.6 mg by mouth Daily., Disp: , Rfl:   •  ONDANSETRON HCL PO, Take 1 tablet by mouth every 4 (four) hours., Disp: , Rfl:   •  pseudoephedrine (SUDAFED) 30 MG tablet, Take 1 tablet by mouth 3 (three) times a day., Disp: 30 tablet, Rfl: 2  •  vitamin D (ERGOCALCIFEROL) 65927 UNITS capsule capsule, TAKE ONE CAPSULE BY MOUTH ONCE WEEKLY, Disp: 6 capsule, Rfl: 1  •  citalopram (CeleXA) 40 MG tablet, TAKE ONE TABLET BY MOUTH DAILY, Disp: 30 tablet, Rfl: 0  •  levothyroxine (SYNTHROID, LEVOTHROID) 100 MCG tablet, TAKE ONE TABLET BY MOUTH DAILY, Disp: 30 tablet, Rfl: 0  •  meloxicam (MOBIC) 15 MG tablet, TAKE ONE TABLET BY MOUTH DAILY AS NEEDED FOR ARTHRITIS, Disp: 30 tablet, Rfl: 0  Review of Systems  Review of Systems       Objective    /72 (BP Location: Left arm)  Pulse 88  Ht 67\" (170.2 cm)  Wt 227 lb (103 kg)  BMI 35.55 kg/m2  Physical Exam   Constitutional: She is oriented to person, place, and time. She appears well-developed and well-nourished. No distress.   HENT:   Head: Normocephalic and atraumatic.   Eyes: EOM are normal. Pupils are equal, round, and reactive to light.   Neck: Normal range of motion.   Cardiovascular: Normal rate, regular rhythm and normal heart sounds.    Pulmonary/Chest: Effort normal and breath sounds normal.   Abdominal: Soft. Bowel sounds are normal. She exhibits no shifting dullness, no distension, no " abdominal bruit, no ascites and no mass. There is no hepatosplenomegaly. There is tenderness. There is no rigidity, no rebound, no guarding and no CVA tenderness. No hernia. Hernia confirmed negative in the ventral area.   obese   Musculoskeletal: Normal range of motion.   Neurological: She is alert and oriented to person, place, and time.   Skin: Skin is warm and dry.   Psychiatric: She has a normal mood and affect. Her behavior is normal. Judgment and thought content normal.   Nursing note and vitals reviewed.    Assessment/Plan      1. Elevated liver enzymes    2. Gastroesophageal reflux disease with esophagitis    3. Hepatic fibrosis    4. Other specified disorders of carbohydrate metabolism     .   Georgia was seen today for heartburn, elevated hepatic enzymes and fatty liver.    Diagnoses and all orders for this visit:    Elevated liver enzymes  -     Hemoglobin A1c; Future  -     Hepatic Function Panel; Future  -     Protime-INR; Future  -     Gamma GT; Future    Gastroesophageal reflux disease with esophagitis  -     Hemoglobin A1c; Future  -     Hepatic Function Panel; Future  -     Protime-INR; Future  -     Gamma GT; Future    Hepatic fibrosis  Comments:  LUCAS Fibrosure .58/F3, steatosis .94/S3, .25/N1  Orders:  -     Hemoglobin A1c; Future  -     Hepatic Function Panel; Future  -     Protime-INR; Future  -     Gamma GT; Future    Other specified disorders of carbohydrate metabolism   -     Hemoglobin A1c; Future        Orders placed during this encounter include:  Orders Placed This Encounter   Procedures   • Hemoglobin A1c     Prior to follow up in Sept     Standing Status:   Future     Standing Expiration Date:   9/30/2017   • Hepatic Function Panel     Prior to follow up in Sept     Standing Status:   Future     Standing Expiration Date:   9/30/2017   • Protime-INR     Prior to follow up in Sept     Standing Status:   Future     Standing Expiration Date:   9/30/2017   • Gamma GT     Prior to follow up  in Sept     Standing Status:   Future     Standing Expiration Date:   9/30/2017       Medications prescribed:  No orders of the defined types were placed in this encounter.    Discontinued Medications       Reason for Discontinue    cefuroxime (CEFTIN) 500 MG tablet Therapy completed    benzonatate (TESSALON) 200 MG capsule Therapy completed        Requested Prescriptions      No prescriptions requested or ordered in this encounter       Review and/or summary of lab tests, radiology, procedures, medications. Review and summary of old records and obtaining of history. The risks and benefits of my recommendations, as well as other treatment options were discussed with the patient today. Questions were answered.    Follow-up: Return in about 3 months (around 9/6/2017), or if symptoms worsen or fail to improve, for lab prior.     * Surgery not found *      This document has been electronically signed by Saulo Lemon PA-C on June 25, 2017 5:28 PM      Results for orders placed or performed in visit on 02/14/17   LUCAS Fibrosure   Result Value Ref Range    Fibrosis Score (References) 0.58 (H) 0.00 - 0.21    Fibrosis Stage (Reference) Comment     Steatosis Score (Reference) 0.94 (H) 0.00 - 0.30    Steatosis Grade (Reference) Comment     LUCAS Score (Reference) 0.50 0.25    Lucas Grade (Reference) Comment     Height: (Reference) 67 Inches    Weight: (Reference) 230 LBS    Alpha 2-Macroglobulins, Qn 234 110 - 276 mg/dL    Haptoglobin 131 34 - 200 mg/dL    Apolipoprotein A-1 131 116 - 209 mg/dL    Total Bilirubin 0.5 0.0 - 1.2 mg/dL     (H) 0 - 60 IU/L    ALT (SGPT) 88 (H) 0 - 40 IU/L    AST (SGOT) P5P (Reference) 128 (H) 0 - 40 IU/L    Cholesterol, Total (Reference) 175 100 - 199 mg/dL    Glucose, Serum (Reference) 124 (H) 65 - 99 mg/dL    Triglycerides 116 0 - 149 mg/dL    Interpretations: (Reference) Comment     Fibrosis Scoring: Comment     Steatosis Grading (Reference) Comment     Lucas Scoring (Reference)  Comment     Limitations: (Reference) Comment     Comment (Reference) Comment    Hepatitis Panel, Acute   Result Value Ref Range    Hepatitis C Ab Negative Negative    Hep A IgM Negative Negative    Hep B C IgM Negative Negative    Hepatitis B Surface Ag Negative Negative   Protime-INR   Result Value Ref Range    Protime 14.7 11.1 - 15.3 Seconds    INR 1.15 0.80 - 1.20   Hepatic Function Panel   Result Value Ref Range    Total Protein 6.2 (L) 6.3 - 8.6 g/dL    Albumin 3.40 3.40 - 4.80 g/dL    ALT (SGPT) 91 (H) 9 - 52 U/L    AST (SGOT) 118 (H) 14 - 36 U/L    Alkaline Phosphatase 124 38 - 126 U/L    Total Bilirubin 0.7 0.2 - 1.3 mg/dL    Bilirubin, Direct 0.0 0.0 - 0.3 mg/dL    Bilirubin, Indirect 0.3 0.0 - 1.1 mg/dL   Results for orders placed or performed during the hospital encounter of 01/13/17   Converted Surgical Pathology   Result Value Ref Range    Spec Descr 1 SPECIMEN(S): A ANTRAL BIOPSY     Specimen description 2 SPECIMEN(S): B ESOPHAGEAL BIOPSY    Results for orders placed or performed during the hospital encounter of 07/25/16   Iron profile   Result Value Ref Range    Iron 81 37 - 170 ug/dl    TIBC 357 265 - 497 ug/dl    Iron Saturation 22.7 15.0 - 50.0 %   Vitamin D 25 hydroxy   Result Value Ref Range    25 Hydroxy, Vitamin D 17.0 (L) 30.0 - 100.0 ng/ml   TSH   Result Value Ref Range    TSH 2.40 0.46 - 4.68 uIU/ml   Vitamin B12   Result Value Ref Range    Vitamin B-12 464 239 - 931 pg/ml   Comprehensive metabolic panel   Result Value Ref Range    Sodium 137 137 - 145 mmol/L    Potassium 4.4 3.5 - 5.1 mmol/L    Chloride 103 95 - 110 mmol/L    CO2 22 22 - 31 mmol/L    Anion Gap 12.0 5.0 - 15.0 mmol/L    Glucose 93 60 - 100 mg/dl    BUN 14 7 - 21 mg/dl    Creatinine 0.8 0.5 - 1.0 mg/dl    GFR MDRD Non African American 70 39 - 90 mL/min/1.73 sq.M    GFR MDRD African American 85 39 - 90 mL/min/1.73 sq.M    Calcium 9.4 8.4 - 10.2 mg/dl    Total Protein 6.4 6.3 - 8.6 gm/dl    Albumin 3.3 (L) 3.4 - 4.8 gm/dl     Total Bilirubin 0.7 0.2 - 1.3 mg/dl    Alkaline Phosphatase 112 38 - 126 U/L    AST (SGOT) 65 (H) 14 - 36 U/L    ALT (SGPT) 54 (H) 9 - 52 U/L   Results for orders placed or performed during the hospital encounter of 11/13/15   Vitamin D 25 hydroxy   Result Value Ref Range    25 Hydroxy, Vitamin D 28.5 (L) 30.0 - 100.0 ng/ml   TSH   Result Value Ref Range    TSH 1.45 0.46 - 4.68 uIU/ml   Vitamin B12   Result Value Ref Range    Vitamin B-12 459 239 - 931 pg/ml   Results for orders placed or performed in visit on 01/23/15   Allergens, Zone 5   Result Value Ref Range    D. farinae (dust mite) <0.10 <=0.34 kU/L    D. pteronyssinus (dust mite) <0.10 <=0.34 kU/L    IMMUCAP Score See Note     Bermuda Grass <0.10 <=0.34 kU/L    Protillo Grass <0.10 <=0.34 kU/L    IgE 91 <=214 kU/L    Alternaria tenuis <0.10 <=0.34 kU/L    Aspergillus fumigatus <0.10 <=0.34 kU/L    Hormodendrum hordei <0.10 <=0.34 kU/L    Penicillium chrysogen <0.10 <=0.34 kU/L    Birch <0.10 <=0.34 kU/L    [place], Pollen <0.10 <=0.34 kU/L    Maple/Box Elder <0.10 <=0.34 kU/L    Cottonseed <0.10 <=0.34 kU/L    Elm, American <0.10 <=0.34 kU/L    [place], Mountain <0.10 <=0.34 kU/L    [place] Tree <0.10 <=0.34 kU/L    [place], White <0.10 <=0.34 kU/L    [place], White <0.10 <=0.34 kU/L    Pigweed, Rough/Common <0.10 <=0.34 kU/L    Russian Thistle <0.10 <=0.34 kU/L    Ragweed, Common/Short <0.10 <=0.34 kU/L    Cat Epithellium <0.10 <=0.34 kU/L    Dog Dander, IgE <0.10 <=0.34 kU/L    Pecan/[place] Tree <0.10 <=0.34 kU/L    Cockroach,Irish <0.10 <=0.34 kU/L    Milk, Cow's <0.10 <=0.34 kU/L    Peanut <0.10 <=0.34 kU/L    Mouse Epithelial <0.10 <=0.34 kU/L    Mucor racemosus <0.10 <=0.34 kU/L    White [place] <0.10 <=0.34 kU/L    Sheep Sorrel <0.10 <=0.34 kU/L   Results for orders placed or performed in visit on 01/09/15   Rheumatoid factor   Result Value Ref Range    Rheumatoid Factor Quantitative Negative Negative   Results for orders placed or performed in visit on  01/09/15   TSH   Result Value Ref Range    TSH 0.80 0.46 - 4.68 uIU/ml     *Note: Due to a large number of results and/or encounters for the requested time period, some results have not been displayed. A complete set of results can be found in Results Review.       Some portions of this note have been dictated using voice recognition software and may contain errors and/or omissions.

## 2017-06-14 RX ORDER — MELOXICAM 15 MG/1
TABLET ORAL
Qty: 30 TABLET | Refills: 0 | Status: SHIPPED | OUTPATIENT
Start: 2017-06-14 | End: 2017-07-19 | Stop reason: SDUPTHER

## 2017-06-14 RX ORDER — CITALOPRAM 40 MG/1
TABLET ORAL
Qty: 30 TABLET | Refills: 0 | Status: SHIPPED | OUTPATIENT
Start: 2017-06-14 | End: 2017-07-19 | Stop reason: SDUPTHER

## 2017-06-14 RX ORDER — LEVOTHYROXINE SODIUM 0.1 MG/1
TABLET ORAL
Qty: 30 TABLET | Refills: 0 | Status: SHIPPED | OUTPATIENT
Start: 2017-06-14 | End: 2017-07-19 | Stop reason: SDUPTHER

## 2017-07-18 ENCOUNTER — OFFICE VISIT (OUTPATIENT)
Dept: FAMILY MEDICINE CLINIC | Facility: CLINIC | Age: 72
End: 2017-07-18

## 2017-07-18 VITALS
WEIGHT: 234 LBS | HEART RATE: 90 BPM | HEIGHT: 67 IN | BODY MASS INDEX: 36.73 KG/M2 | SYSTOLIC BLOOD PRESSURE: 142 MMHG | RESPIRATION RATE: 16 BRPM | OXYGEN SATURATION: 97 % | TEMPERATURE: 98.2 F | DIASTOLIC BLOOD PRESSURE: 79 MMHG

## 2017-07-18 DIAGNOSIS — J32.0 CHRONIC MAXILLARY SINUSITIS: Primary | ICD-10-CM

## 2017-07-18 DIAGNOSIS — H04.123 DRY EYES, BILATERAL: ICD-10-CM

## 2017-07-18 PROCEDURE — 99214 OFFICE O/P EST MOD 30 MIN: CPT | Performed by: NURSE PRACTITIONER

## 2017-07-18 RX ORDER — AMOXICILLIN AND CLAVULANATE POTASSIUM 875; 125 MG/1; MG/1
1 TABLET, FILM COATED ORAL 2 TIMES DAILY
Qty: 20 TABLET | Refills: 0 | Status: SHIPPED | OUTPATIENT
Start: 2017-07-18 | End: 2017-09-12

## 2017-07-18 RX ORDER — CYCLOSPORINE 0.5 MG/ML
1 EMULSION OPHTHALMIC EVERY 12 HOURS
Qty: 5.5 ML | Refills: 5 | Status: SHIPPED | OUTPATIENT
Start: 2017-07-18 | End: 2020-02-13 | Stop reason: SDUPTHER

## 2017-07-18 NOTE — PROGRESS NOTES
Subjective   Pita Hyatt is a 72 y.o. female.     HPI Comments: Here today for josephine.  Needs refills on her restatis and she thinks she has another sinus infection.  She feels pressure on the right, worse in the maxillary area.  She has been using the netti pot and reports that what does seem to come out is discolored.  No reported fever.    Sinusitis   This is a chronic problem. The current episode started in the past 7 days. The problem has been gradually worsening since onset. There has been no fever. Her pain is at a severity of 4/10. The pain is moderate. Associated symptoms include congestion and sinus pressure. Pertinent negatives include no chills, coughing, diaphoresis, ear pain, headaches, hoarse voice, neck pain, shortness of breath, sneezing, sore throat or swollen glands. Past treatments include oral decongestants. The treatment provided mild relief.        The following portions of the patient's history were reviewed and updated as appropriate: allergies, current medications, past family history, past medical history, past social history, past surgical history and problem list.    Review of Systems   Constitutional: Negative for chills and diaphoresis.   HENT: Positive for congestion, postnasal drip, rhinorrhea and sinus pressure. Negative for ear pain, hoarse voice, sneezing and sore throat.    Respiratory: Negative.  Negative for cough and shortness of breath.    Cardiovascular: Negative.    Musculoskeletal: Negative.  Negative for neck pain.   Skin: Negative.    Neurological: Negative.  Negative for headaches.   Psychiatric/Behavioral: Negative.        Objective   Physical Exam   Constitutional: She is oriented to person, place, and time. She appears well-developed and well-nourished. No distress.   HENT:   Head: Normocephalic.   Right Ear: Hearing, tympanic membrane, external ear and ear canal normal. Tympanic membrane is not injected.   Left Ear: Hearing, tympanic membrane, external ear and ear  canal normal. Tympanic membrane is not injected.   Nose: Right sinus exhibits maxillary sinus tenderness and frontal sinus tenderness. Left sinus exhibits no maxillary sinus tenderness and no frontal sinus tenderness.   Mouth/Throat: Oropharyngeal exudate present.   X ray of sinuses is negative.   Neck: Normal range of motion. Neck supple. No thyromegaly present.   Cardiovascular: Normal rate, regular rhythm and normal heart sounds.  Exam reveals no friction rub.    No murmur heard.  Pulmonary/Chest: Effort normal and breath sounds normal. No respiratory distress. She has no wheezes. She has no rales.   Abdominal: Soft. Bowel sounds are normal.   Musculoskeletal: Normal range of motion.   Neurological: She is alert and oriented to person, place, and time.   Skin: Skin is warm and dry.   Psychiatric: She has a normal mood and affect. Thought content normal.   Nursing note and vitals reviewed.      Assessment/Plan   Georgia was seen today for sinusitis.    Diagnoses and all orders for this visit:    Chronic maxillary sinusitis  -     XR Sinuses 3+ View (In Office)  -     amoxicillin-clavulanate (AUGMENTIN) 875-125 MG per tablet; Take 1 tablet by mouth 2 (Two) Times a Day.    Dry eyes, bilateral  -     cycloSPORINE (RESTASIS) 0.05 % ophthalmic emulsion; Apply 1 drop to eye Every 12 (Twelve) Hours.      Have offered her an appointment with ent, she does not want to see ent a this time.

## 2017-07-19 RX ORDER — MELOXICAM 15 MG/1
TABLET ORAL
Qty: 30 TABLET | Refills: 0 | Status: SHIPPED | OUTPATIENT
Start: 2017-07-19 | End: 2017-08-16 | Stop reason: SDUPTHER

## 2017-07-19 RX ORDER — CITALOPRAM 40 MG/1
TABLET ORAL
Qty: 30 TABLET | Refills: 0 | Status: SHIPPED | OUTPATIENT
Start: 2017-07-19 | End: 2017-08-16 | Stop reason: SDUPTHER

## 2017-07-19 RX ORDER — LEVOTHYROXINE SODIUM 0.1 MG/1
TABLET ORAL
Qty: 30 TABLET | Refills: 0 | Status: SHIPPED | OUTPATIENT
Start: 2017-07-19 | End: 2017-08-16 | Stop reason: SDUPTHER

## 2017-08-16 RX ORDER — CITALOPRAM 40 MG/1
TABLET ORAL
Qty: 30 TABLET | Refills: 0 | Status: SHIPPED | OUTPATIENT
Start: 2017-08-16 | End: 2017-09-20 | Stop reason: SDUPTHER

## 2017-08-16 RX ORDER — LEVOTHYROXINE SODIUM 0.1 MG/1
TABLET ORAL
Qty: 30 TABLET | Refills: 0 | Status: SHIPPED | OUTPATIENT
Start: 2017-08-16 | End: 2017-09-20 | Stop reason: SDUPTHER

## 2017-08-16 RX ORDER — MELOXICAM 15 MG/1
TABLET ORAL
Qty: 30 TABLET | Refills: 0 | Status: SHIPPED | OUTPATIENT
Start: 2017-08-16 | End: 2017-09-20 | Stop reason: SDUPTHER

## 2017-08-28 RX ORDER — ERGOCALCIFEROL 1.25 MG/1
CAPSULE ORAL
Qty: 6 CAPSULE | Refills: 0 | Status: SHIPPED | OUTPATIENT
Start: 2017-08-28 | End: 2018-04-11

## 2017-09-12 ENCOUNTER — OFFICE VISIT (OUTPATIENT)
Dept: GASTROENTEROLOGY | Facility: CLINIC | Age: 72
End: 2017-09-12

## 2017-09-12 VITALS
SYSTOLIC BLOOD PRESSURE: 128 MMHG | DIASTOLIC BLOOD PRESSURE: 82 MMHG | BODY MASS INDEX: 37.35 KG/M2 | WEIGHT: 238 LBS | HEART RATE: 81 BPM | HEIGHT: 67 IN

## 2017-09-12 DIAGNOSIS — R73.09 ELEVATED GLUCOSE: ICD-10-CM

## 2017-09-12 DIAGNOSIS — R74.8 ELEVATED LIVER ENZYMES: Primary | ICD-10-CM

## 2017-09-12 DIAGNOSIS — K21.00 GASTROESOPHAGEAL REFLUX DISEASE WITH ESOPHAGITIS: ICD-10-CM

## 2017-09-12 DIAGNOSIS — K74.00 HEPATIC FIBROSIS: ICD-10-CM

## 2017-09-12 PROCEDURE — 36415 COLL VENOUS BLD VENIPUNCTURE: CPT | Performed by: FAMILY MEDICINE

## 2017-09-12 PROCEDURE — 80076 HEPATIC FUNCTION PANEL: CPT | Performed by: PHYSICIAN ASSISTANT

## 2017-09-12 PROCEDURE — 83036 HEMOGLOBIN GLYCOSYLATED A1C: CPT | Performed by: PHYSICIAN ASSISTANT

## 2017-09-12 PROCEDURE — 85610 PROTHROMBIN TIME: CPT | Performed by: PHYSICIAN ASSISTANT

## 2017-09-12 PROCEDURE — 99213 OFFICE O/P EST LOW 20 MIN: CPT | Performed by: PHYSICIAN ASSISTANT

## 2017-09-13 LAB
ALBUMIN SERPL-MCNC: 3.3 G/DL (ref 3.4–4.8)
ALP SERPL-CCNC: 135 U/L (ref 38–126)
ALT SERPL W P-5'-P-CCNC: 77 U/L (ref 9–52)
AST SERPL-CCNC: 109 U/L (ref 14–36)
BILIRUB CONJ SERPL-MCNC: 0 MG/DL (ref 0–0.3)
BILIRUB INDIRECT SERPL-MCNC: 0.2 MG/DL (ref 0–1.1)
BILIRUB SERPL-MCNC: 0.7 MG/DL (ref 0.2–1.3)
HBA1C MFR BLD: 5.6 % (ref 4–5.6)
INR PPP: 1.15 (ref 0.8–1.2)
PROT SERPL-MCNC: 6.2 G/DL (ref 6.3–8.6)
PROTHROMBIN TIME: 14.6 SECONDS (ref 11.1–15.3)

## 2017-09-14 RX ORDER — DOXEPIN HYDROCHLORIDE 25 MG/1
CAPSULE ORAL
Qty: 30 CAPSULE | Refills: 3 | Status: SHIPPED | OUTPATIENT
Start: 2017-09-14 | End: 2018-04-11

## 2017-09-20 DIAGNOSIS — J30.9 ALLERGIC RHINITIS, UNSPECIFIED ALLERGIC RHINITIS TRIGGER, UNSPECIFIED RHINITIS SEASONALITY: ICD-10-CM

## 2017-09-21 RX ORDER — MELOXICAM 15 MG/1
TABLET ORAL
Qty: 30 TABLET | Refills: 2 | Status: SHIPPED | OUTPATIENT
Start: 2017-09-21 | End: 2018-01-15 | Stop reason: SDUPTHER

## 2017-09-21 RX ORDER — LEVOTHYROXINE SODIUM 0.1 MG/1
TABLET ORAL
Qty: 30 TABLET | Refills: 2 | Status: SHIPPED | OUTPATIENT
Start: 2017-09-21 | End: 2018-01-15 | Stop reason: SDUPTHER

## 2017-09-21 RX ORDER — CITALOPRAM 40 MG/1
TABLET ORAL
Qty: 30 TABLET | Refills: 2 | Status: SHIPPED | OUTPATIENT
Start: 2017-09-21 | End: 2018-01-15 | Stop reason: SDUPTHER

## 2017-09-21 RX ORDER — CETIRIZINE HYDROCHLORIDE, PSEUDOEPHEDRINE HYDROCHLORIDE 5; 120 MG/1; MG/1
TABLET, FILM COATED, EXTENDED RELEASE ORAL
Qty: 60 TABLET | Refills: 2 | Status: SHIPPED | OUTPATIENT
Start: 2017-09-21 | End: 2018-03-20 | Stop reason: SDUPTHER

## 2017-09-26 ENCOUNTER — OFFICE VISIT (OUTPATIENT)
Dept: FAMILY MEDICINE CLINIC | Facility: CLINIC | Age: 72
End: 2017-09-26

## 2017-09-26 VITALS
DIASTOLIC BLOOD PRESSURE: 70 MMHG | OXYGEN SATURATION: 96 % | BODY MASS INDEX: 37.2 KG/M2 | TEMPERATURE: 98.3 F | HEART RATE: 90 BPM | HEIGHT: 67 IN | WEIGHT: 237 LBS | RESPIRATION RATE: 18 BRPM | SYSTOLIC BLOOD PRESSURE: 124 MMHG

## 2017-09-26 DIAGNOSIS — J32.0 CHRONIC MAXILLARY SINUSITIS: Primary | ICD-10-CM

## 2017-09-26 PROCEDURE — 99213 OFFICE O/P EST LOW 20 MIN: CPT | Performed by: NURSE PRACTITIONER

## 2017-09-26 PROCEDURE — 96372 THER/PROPH/DIAG INJ SC/IM: CPT | Performed by: NURSE PRACTITIONER

## 2017-09-26 RX ORDER — CEFUROXIME AXETIL 500 MG/1
500 TABLET ORAL 2 TIMES DAILY
Qty: 40 TABLET | Refills: 0 | Status: SHIPPED | OUTPATIENT
Start: 2017-09-26 | End: 2017-10-09

## 2017-09-26 RX ORDER — BETAMETHASONE SODIUM PHOSPHATE AND BETAMETHASONE ACETATE 3; 3 MG/ML; MG/ML
12 INJECTION, SUSPENSION INTRA-ARTICULAR; INTRALESIONAL; INTRAMUSCULAR; SOFT TISSUE ONCE
Status: COMPLETED | OUTPATIENT
Start: 2017-09-26 | End: 2017-09-26

## 2017-09-26 RX ADMIN — BETAMETHASONE SODIUM PHOSPHATE AND BETAMETHASONE ACETATE 12 MG: 3; 3 INJECTION, SUSPENSION INTRA-ARTICULAR; INTRALESIONAL; INTRAMUSCULAR; SOFT TISSUE at 15:59

## 2017-09-26 NOTE — PROGRESS NOTES
Subjective   Pita Hyatt is a 72 y.o. female.     HPI Comments: Here today with recurrent sinusitis.  She is having pain in her right max area.  Nasal drainage is discolored.  Have offered her an appointment with ent in the past since this is happens so often.      Sinusitis   This is a recurrent problem. The current episode started in the past 7 days. The problem is unchanged. There has been no fever. Her pain is at a severity of 7/10. The pain is moderate. Associated symptoms include congestion, coughing, headaches, a hoarse voice and sinus pressure. Pertinent negatives include no chills, diaphoresis, ear pain, neck pain, shortness of breath, sneezing, sore throat or swollen glands. Past treatments include antibiotics and oral decongestants. The treatment provided mild relief.        The following portions of the patient's history were reviewed and updated as appropriate: allergies, current medications, past family history, past medical history, past social history, past surgical history and problem list.    Review of Systems   Constitutional: Negative.  Negative for chills and diaphoresis.   HENT: Positive for congestion, hoarse voice and sinus pressure. Negative for ear pain, sneezing and sore throat.    Respiratory: Positive for cough. Negative for shortness of breath.    Cardiovascular: Negative.    Musculoskeletal: Negative.  Negative for neck pain.   Skin: Negative.    Neurological: Positive for headaches.   Psychiatric/Behavioral: Negative.        Objective   Physical Exam   Constitutional: She appears well-developed and well-nourished. No distress.   HENT:   Head: Normocephalic.   Right Ear: Hearing, tympanic membrane, external ear and ear canal normal. Tympanic membrane is not scarred.   Left Ear: Hearing, tympanic membrane, external ear and ear canal normal. Tympanic membrane is not scarred.   Nose: Rhinorrhea present. Right sinus exhibits maxillary sinus tenderness. Right sinus exhibits no frontal  sinus tenderness. Left sinus exhibits no maxillary sinus tenderness and no frontal sinus tenderness.   Mouth/Throat: Oropharyngeal exudate present.   Nursing note and vitals reviewed.      Assessment/Plan   [place] was seen today for sinusitis.    Diagnoses and all orders for this visit:    Chronic maxillary sinusitis  -     betamethasone acetate-betamethasone sodium phosphate (CELESTONE SOLUSPAN) injection 12 mg; Inject 2 mL into the shoulder, thigh, or buttocks 1 (One) Time.  -     cefuroxime (CEFTIN) 500 MG tablet; Take 1 tablet by mouth 2 (Two) Times a Day.  -     Ambulatory Referral to ENT (Otolaryngology)    appointment with dr obando will be made.

## 2017-10-09 ENCOUNTER — OFFICE VISIT (OUTPATIENT)
Dept: FAMILY MEDICINE CLINIC | Facility: CLINIC | Age: 72
End: 2017-10-09

## 2017-10-09 VITALS
HEIGHT: 67 IN | HEART RATE: 85 BPM | BODY MASS INDEX: 37.2 KG/M2 | OXYGEN SATURATION: 91 % | RESPIRATION RATE: 16 BRPM | WEIGHT: 237 LBS | TEMPERATURE: 97.7 F | DIASTOLIC BLOOD PRESSURE: 76 MMHG | SYSTOLIC BLOOD PRESSURE: 137 MMHG

## 2017-10-09 DIAGNOSIS — J01.01 ACUTE RECURRENT MAXILLARY SINUSITIS: Primary | ICD-10-CM

## 2017-10-09 PROCEDURE — 99213 OFFICE O/P EST LOW 20 MIN: CPT | Performed by: NURSE PRACTITIONER

## 2017-10-09 RX ORDER — AMOXICILLIN AND CLAVULANATE POTASSIUM 875; 125 MG/1; MG/1
1 TABLET, FILM COATED ORAL 2 TIMES DAILY
Qty: 20 TABLET | Refills: 0 | Status: SHIPPED | OUTPATIENT
Start: 2017-10-09 | End: 2018-01-18

## 2017-10-09 NOTE — PROGRESS NOTES
Subjective   Pita Hyatt is a 72 y.o. female.     HPI Comments: Cont to have problems with max sinusitis.  Is worse on the right.  Was just here  The end of last month.  She cont to take the antibiotic and have referred her to ent.  Appointment has not yet been established.    Sinusitis   This is a recurrent problem. The current episode started 1 to 4 weeks ago. The problem is unchanged. There has been no fever. Her pain is at a severity of 6/10. The pain is moderate. Associated symptoms include congestion, coughing and sinus pressure. Pertinent negatives include no chills, diaphoresis, ear pain, headaches, hoarse voice, neck pain, shortness of breath, sneezing, sore throat or swollen glands. Past treatments include antibiotics, acetaminophen, oral decongestants, saline sprays, sitting up and spray decongestants. The treatment provided mild relief.        The following portions of the patient's history were reviewed and updated as appropriate: allergies, current medications, past family history, past medical history, past social history, past surgical history and problem list.    Review of Systems   Constitutional: Negative.  Negative for chills and diaphoresis.   HENT: Positive for congestion and sinus pressure. Negative for ear pain, hoarse voice, sneezing and sore throat.    Respiratory: Positive for cough. Negative for shortness of breath.    Cardiovascular: Negative.    Gastrointestinal: Negative.    Musculoskeletal: Negative.  Negative for neck pain.   Skin: Negative.    Neurological: Negative.  Negative for headaches.   Psychiatric/Behavioral: Negative.        Objective   Physical Exam   Constitutional: She is oriented to person, place, and time. She appears well-developed and well-nourished. No distress.   HENT:   Head: Normocephalic and atraumatic.   Right Ear: Hearing, tympanic membrane, external ear and ear canal normal.   Left Ear: Hearing, tympanic membrane, external ear and ear canal normal.   Nose:  Right sinus exhibits maxillary sinus tenderness. Right sinus exhibits no frontal sinus tenderness. Left sinus exhibits no maxillary sinus tenderness and no frontal sinus tenderness.   Mouth/Throat: Oropharynx is clear and moist.   Eyes: Pupils are equal, round, and reactive to light.   Neck: Normal range of motion. Neck supple. No thyromegaly present.   Cardiovascular: Normal rate, regular rhythm and normal heart sounds.  Exam reveals no friction rub.    No murmur heard.  Pulmonary/Chest: Effort normal and breath sounds normal. No respiratory distress. She has no wheezes. She has no rales.   Abdominal: Soft.   Musculoskeletal: Normal range of motion.   Neurological: She is alert and oriented to person, place, and time.   Skin: Skin is warm and dry.   Psychiatric: She has a normal mood and affect. Thought content normal.   Nursing note and vitals reviewed.      Assessment/Plan   [place] was seen today for sinus issues.    Diagnoses and all orders for this visit:    Acute recurrent maxillary sinusitis  -     CT sinus wo contrast; Future  -     amoxicillin-clavulanate (AUGMENTIN) 875-125 MG per tablet; Take 1 tablet by mouth 2 (Two) Times a Day.      Will order ct of sinus and will change her to augmentin.  Will check on status of referral.

## 2017-10-13 ENCOUNTER — TELEPHONE (OUTPATIENT)
Dept: FAMILY MEDICINE CLINIC | Facility: CLINIC | Age: 72
End: 2017-10-13

## 2017-10-13 NOTE — TELEPHONE ENCOUNTER
appt Scheduled at [facility] for OCt 23rd 1 pm for CT Scan      Left message to contact office

## 2017-11-06 DIAGNOSIS — J01.01 ACUTE RECURRENT MAXILLARY SINUSITIS: ICD-10-CM

## 2018-01-15 RX ORDER — CITALOPRAM 40 MG/1
TABLET ORAL
Qty: 30 TABLET | Refills: 3 | Status: SHIPPED | OUTPATIENT
Start: 2018-01-15 | End: 2018-04-11

## 2018-01-15 RX ORDER — LEVOTHYROXINE SODIUM 0.1 MG/1
TABLET ORAL
Qty: 30 TABLET | Refills: 3 | Status: SHIPPED | OUTPATIENT
Start: 2018-01-15 | End: 2018-04-11

## 2018-01-15 RX ORDER — MELOXICAM 15 MG/1
TABLET ORAL
Qty: 30 TABLET | Refills: 3 | Status: SHIPPED | OUTPATIENT
Start: 2018-01-15 | End: 2018-04-11

## 2018-01-18 ENCOUNTER — OFFICE VISIT (OUTPATIENT)
Dept: GASTROENTEROLOGY | Facility: CLINIC | Age: 73
End: 2018-01-18

## 2018-01-18 VITALS
HEART RATE: 87 BPM | WEIGHT: 236.4 LBS | SYSTOLIC BLOOD PRESSURE: 152 MMHG | DIASTOLIC BLOOD PRESSURE: 80 MMHG | BODY MASS INDEX: 37.1 KG/M2 | HEIGHT: 67 IN

## 2018-01-18 DIAGNOSIS — K74.00 HEPATIC FIBROSIS: ICD-10-CM

## 2018-01-18 DIAGNOSIS — K21.00 GASTROESOPHAGEAL REFLUX DISEASE WITH ESOPHAGITIS: Primary | ICD-10-CM

## 2018-01-18 DIAGNOSIS — K76.0 FATTY LIVER: ICD-10-CM

## 2018-01-18 DIAGNOSIS — R74.8 ELEVATED LIVER ENZYMES: ICD-10-CM

## 2018-01-18 PROCEDURE — 99213 OFFICE O/P EST LOW 20 MIN: CPT | Performed by: PHYSICIAN ASSISTANT

## 2018-01-18 RX ORDER — HYOSCYAMINE SULFATE 0.125 MG
125 TABLET,DISINTEGRATING ORAL EVERY 4 HOURS PRN
Qty: 45 TABLET | Refills: 3 | Status: SHIPPED | OUTPATIENT
Start: 2018-01-18 | End: 2018-04-11

## 2018-01-18 RX ORDER — THIAMINE MONONITRATE (VIT B1) 100 MG
100 TABLET ORAL DAILY
COMMUNITY
End: 2020-06-19

## 2018-01-18 NOTE — PROGRESS NOTES
Chief Complaint   Patient presents with   • Elevated Hepatic Enzymes   • Heartburn   • Hepatic Fibrosis       ENDO PROCEDURE ORDERED:    Subjective    Pita Hyatt is a 72 y.o. female. she is here today for follow-up.    History of Present Illness    SUBJECTIVE:  The patient was seen on a recheck of her GERD, hepatic fibrosis, elevated liver enzymes, fatty liver, F3/F3/N1.  Last seen 09/12/2017.  The patient states her GERD is doing well on over-the-counter Prilosec.  She denied nausea, vomiting, dysphagia.  She is on Levsin for IBS symptoms.  Her weight is down 1.6 pounds since last visit.  Last colonoscopy 01/13/2017.    Laboratories on 09/12/2017:  A1c 5.6%.  INR 1.15.  LFTs showed a protein of 6.2, albumin 3.3, alkaline phosphatase 135, , ALT 77, otherwise normal.    ASSESSMENT/PLAN:  The patient was encouraged to continue dietary modification and weight loss for her fatty liver.  LFTs are still moderately elevated.  She is encouraged to continue to avoid gastric irritants.  She requested a refill on her Levsin.  I recommended repeat LFTs, INR today.  She declined stating she would have them done before her next appointment.  I recommended follow up in 3-4 months.  Would consider repeat imaging at 1 year from her last.  We will pursue further pending clinical course and the results of the above.         The following portions of the patient's history were reviewed and updated as appropriate:   Past Medical History:   Diagnosis Date   • Acquired hypothyroidism    • Acute bronchitis    • Acute gastritis    • Acute maxillary sinusitis    • Acute maxillary sinusitis, unspecified    • Acute serous otitis media     R   • Acute sinusitis     Could be R mastoid    • Allergic rhinitis    • Anxiety state    • Chronic sinusitis    • Conjunctivitis    • Cough    • Degenerative joint disease of shoulder region    • Depressive disorder     improving   • Diarrhea    • Dysfunction of eustachian tube    • Epigastric  pain    • Essential hypertension    • Female stress incontinence    • Forgetfulness    • Functional diarrhea    • Gynecological Examination    • Knee joint replaced by other means    • Malaise and fatigue    • Menopause    • Nausea and vomiting    • Other Chest pain    • Polyp of colon    • Preop examination, unspecified    • Rhinitis medicamentosa    • Shoulder pain    • Skin tag- removal    • Subclinical hypothyroidism      Past Surgical History:   Procedure Laterality Date   • COLONOSCOPY  07/16/2012   • COLONOSCOPY  01/13/2017   • ENDOSCOPY AND COLONOSCOPY      Diverticulum in sigmoid colon. 2 polyps in ascending colon & descending colon; removed by snare cautery polypectomy   • HYSTERECTOMY     • INJECTION OF MEDICATION      celestone (betamethasone) 12mg   • INJECTION OF MEDICATION      Depo Medrol 2ml   • INJECTION OF MEDICATION      Rocephin 1gm   • REPLACEMENT TOTAL KNEE     • UPPER GASTROINTESTINAL ENDOSCOPY  07/16/2012   • UPPER GASTROINTESTINAL ENDOSCOPY  01/13/2017     Family History   Problem Relation Age of Onset   • Hypertension Other    • Stroke Other    • Diabetes Other    • Ulcers Other    • Cancer Other      OB History     No data available        Allergies   Allergen Reactions   • Phenergan [Promethazine]      Social History     Social History   • Marital status:      Spouse name: N/A   • Number of children: N/A   • Years of education: N/A     Social History Main Topics   • Smoking status: Former Smoker   • Smokeless tobacco: Never Used   • Alcohol use No   • Drug use: No   • Sexual activity: Not Asked     Other Topics Concern   • None     Social History Narrative       Current Outpatient Prescriptions:   •  cetirizine-pseudoephedrine (ZyrTEC-D) 5-120 MG per 12 hr tablet, TAKE ONE TABLET BY MOUTH TWICE A DAY, Disp: 60 tablet, Rfl: 2  •  citalopram (CeleXA) 40 MG tablet, TAKE ONE TABLET BY MOUTH DAILY, Disp: 30 tablet, Rfl: 3  •  cycloSPORINE (RESTASIS) 0.05 % ophthalmic emulsion, Apply 1  "drop to eye Every 12 (Twelve) Hours., Disp: 5.5 mL, Rfl: 5  •  doxepin (SINEquan) 25 MG capsule, TAKE ONE OR TWO CAPSULES BY MOUTH THREE TIMES A DAY AS NEEDED FOR ANXIETY ATTACKS, Disp: 30 capsule, Rfl: 3  •  fluticasone (FLOVENT DISKUS) 50 MCG/BLIST diskus inhaler, Inhale 2 puffs 1 (one) time daily., Disp: , Rfl:   •  hyoscyamine sulfate (ANASPAZ) 0.125 MG tablet dispersible disintegrating tablet, Take 1 tablet by mouth Every 4 (Four) Hours As Needed (abd pain)., Disp: 45 tablet, Rfl: 3  •  IBUPROFEN PO, Take  by mouth every night., Disp: , Rfl:   •  levothyroxine (SYNTHROID, LEVOTHROID) 100 MCG tablet, TAKE ONE TABLET BY MOUTH DAILY, Disp: 30 tablet, Rfl: 3  •  meloxicam (MOBIC) 15 MG tablet, TAKE ONE TABLET BY MOUTH DAILY AS NEEDED FOR ARTHRITIS, Disp: 30 tablet, Rfl: 3  •  Omeprazole Magnesium 20.6 (20 BASE) MG capsule delayed-release, Take 20.6 mg by mouth Daily., Disp: , Rfl:   •  ONDANSETRON HCL PO, Take 1 tablet by mouth every 4 (four) hours., Disp: , Rfl:   •  thiamine (VITAMIN B-1) 100 MG tablet, Take 100 mg by mouth Daily., Disp: , Rfl:   •  vitamin D (ERGOCALCIFEROL) 13565 units capsule capsule, TAKE ONE CAPSULE BY MOUTH ONCE WEEKLY, Disp: 6 capsule, Rfl: 0  Review of Systems  Review of Systems       Objective    /80 (BP Location: Left arm)  Pulse 87  Ht 170.2 cm (67.01\")  Wt 107 kg (236 lb 6.4 oz)  BMI 37.02 kg/m2  Physical Exam   Constitutional: She is oriented to person, place, and time. She appears well-developed and well-nourished. No distress.   HENT:   Head: Normocephalic and atraumatic.   Eyes: EOM are normal. Pupils are equal, round, and reactive to light.   Neck: Normal range of motion.   Cardiovascular: Normal rate, regular rhythm and normal heart sounds.    Pulmonary/Chest: Effort normal and breath sounds normal.   Abdominal: Soft. Bowel sounds are normal. She exhibits no shifting dullness, no distension, no abdominal bruit, no ascites and no mass. There is no hepatosplenomegaly. " There is tenderness. There is no rigidity, no rebound, no guarding and no CVA tenderness. No hernia. Hernia confirmed negative in the ventral area.   obese   Musculoskeletal: Normal range of motion.   Neurological: She is alert and oriented to person, place, and time.   Skin: Skin is warm and dry.   Psychiatric: She has a normal mood and affect. Her behavior is normal. Judgment and thought content normal.   Nursing note and vitals reviewed.    Assessment/Plan      1. Gastroesophageal reflux disease with esophagitis    2. Elevated liver enzymes    3. Hepatic fibrosis    4. Fatty liver    .   [place] was seen today for elevated hepatic enzymes, heartburn and hepatic fibrosis.    Diagnoses and all orders for this visit:    Gastroesophageal reflux disease with esophagitis  -     Hepatic Function Panel  -     Protime-INR    Elevated liver enzymes  -     Hepatic Function Panel  -     Protime-INR    Hepatic fibrosis  -     Hepatic Function Panel  -     Protime-INR    Fatty liver  Comments:  F3/S3/N1  Orders:  -     Hepatic Function Panel  -     Protime-INR    Other orders  -     hyoscyamine sulfate (ANASPAZ) 0.125 MG tablet dispersible disintegrating tablet; Take 1 tablet by mouth Every 4 (Four) Hours As Needed (abd pain).        Orders placed during this encounter include:  Orders Placed This Encounter   Procedures   • Hepatic Function Panel   • Protime-INR       Medications prescribed:  New Medications Ordered This Visit   Medications   • hyoscyamine sulfate (ANASPAZ) 0.125 MG tablet dispersible disintegrating tablet     Sig: Take 1 tablet by mouth Every 4 (Four) Hours As Needed (abd pain).     Dispense:  45 tablet     Refill:  3     Discontinued Medications       Reason for Discontinue    amoxicillin-clavulanate (AUGMENTIN) 875-125 MG per tablet Therapy completed    albuterol (VENTOLIN HFA) 108 (90 BASE) MCG/ACT inhaler Therapy completed    pseudoephedrine (SUDAFED) 30 MG tablet Therapy completed        Requested  Prescriptions     Signed Prescriptions Disp Refills   • hyoscyamine sulfate (ANASPAZ) 0.125 MG tablet dispersible disintegrating tablet 45 tablet 3     Sig: Take 1 tablet by mouth Every 4 (Four) Hours As Needed (abd pain).       Review and/or summary of lab tests, radiology, procedures, medications. Review and summary of old records and obtaining of history. The risks and benefits of my recommendations, as well as other treatment options were discussed with the patient today. Questions were answered.    Follow-up: Return in about 4 months (around 5/18/2018), or if symptoms worsen or fail to improve.     * Surgery not found *      This document has been electronically signed by Saulo Lemon PA-C on January 21, 2018 12:16 PM      Results for orders placed or performed in visit on 09/12/17   Protime-INR   Result Value Ref Range    Protime 14.6 11.1 - 15.3 Seconds    INR 1.15 0.80 - 1.20   Hemoglobin A1c   Result Value Ref Range    Hemoglobin A1C 5.6 4 - 5.6 %   Hepatic Function Panel   Result Value Ref Range    Total Protein 6.2 (L) 6.3 - 8.6 g/dL    Albumin 3.30 (L) 3.40 - 4.80 g/dL    ALT (SGPT) 77 (H) 9 - 52 U/L    AST (SGOT) 109 (H) 14 - 36 U/L    Alkaline Phosphatase 135 (H) 38 - 126 U/L    Total Bilirubin 0.7 0.2 - 1.3 mg/dL    Bilirubin, Direct 0.0 0.0 - 0.3 mg/dL    Bilirubin, Indirect 0.2 0.0 - 1.1 mg/dL   Results for orders placed or performed in visit on 02/14/17   LUCAS Fibrosure   Result Value Ref Range    Fibrosis Score (References) 0.58 (H) 0.00 - 0.21    Fibrosis Stage (Reference) Comment     Steatosis Score (Reference) 0.94 (H) 0.00 - 0.30    Steatosis Grade (Reference) Comment     LUCAS Score (Reference) 0.50 0.25    Lucas Grade (Reference) Comment     Height: (Reference) 67 Inches    Weight: (Reference) 230 LBS    Alpha 2-Macroglobulins, Qn 234 110 - 276 mg/dL    Haptoglobin 131 34 - 200 mg/dL    Apolipoprotein A-1 131 116 - 209 mg/dL    Total Bilirubin 0.5 0.0 - 1.2 mg/dL     (H) 0 - 60  IU/L    ALT (SGPT) 88 (H) 0 - 40 IU/L    AST (SGOT) P5P (Reference) 128 (H) 0 - 40 IU/L    Cholesterol, Total (Reference) 175 100 - 199 mg/dL    Glucose, Serum (Reference) 124 (H) 65 - 99 mg/dL    Triglycerides 116 0 - 149 mg/dL    Interpretations: (Reference) Comment     Fibrosis Scoring: Comment     Steatosis Grading (Reference) Comment     Richard Scoring (Reference) Comment     Limitations: (Reference) Comment     Comment (Reference) Comment    Hepatitis Panel, Acute   Result Value Ref Range    Hepatitis C Ab Negative Negative    Hep A IgM Negative Negative    Hep B C IgM Negative Negative    Hepatitis B Surface Ag Negative Negative   Protime-INR   Result Value Ref Range    Protime 14.7 11.1 - 15.3 Seconds    INR 1.15 0.80 - 1.20   Hepatic Function Panel   Result Value Ref Range    Total Protein 6.2 (L) 6.3 - 8.6 g/dL    Albumin 3.40 3.40 - 4.80 g/dL    ALT (SGPT) 91 (H) 9 - 52 U/L    AST (SGOT) 118 (H) 14 - 36 U/L    Alkaline Phosphatase 124 38 - 126 U/L    Total Bilirubin 0.7 0.2 - 1.3 mg/dL    Bilirubin, Direct 0.0 0.0 - 0.3 mg/dL    Bilirubin, Indirect 0.3 0.0 - 1.1 mg/dL   Results for orders placed or performed during the hospital encounter of 01/13/17   Converted Surgical Pathology   Result Value Ref Range    Spec Descr 1 SPECIMEN(S): A ANTRAL BIOPSY     Specimen description 2 SPECIMEN(S): B ESOPHAGEAL BIOPSY    Results for orders placed or performed during the hospital encounter of 07/25/16   Iron profile   Result Value Ref Range    Iron 81 37 - 170 ug/dl    TIBC 357 265 - 497 ug/dl    Iron Saturation 22.7 15.0 - 50.0 %   Vitamin D 25 hydroxy   Result Value Ref Range    25 Hydroxy, Vitamin D 17.0 (L) 30.0 - 100.0 ng/ml   TSH   Result Value Ref Range    TSH 2.40 0.46 - 4.68 uIU/ml   Vitamin B12   Result Value Ref Range    Vitamin B-12 464 239 - 931 pg/ml   Comprehensive metabolic panel   Result Value Ref Range    Sodium 137 137 - 145 mmol/L    Potassium 4.4 3.5 - 5.1 mmol/L    Chloride 103 95 - 110 mmol/L     CO2 22 22 - 31 mmol/L    Anion Gap 12.0 5.0 - 15.0 mmol/L    Glucose 93 60 - 100 mg/dl    BUN 14 7 - 21 mg/dl    Creatinine 0.8 0.5 - 1.0 mg/dl    GFR MDRD Non African American 70 39 - 90 mL/min/1.73 sq.M    GFR MDRD African American 85 39 - 90 mL/min/1.73 sq.M    Calcium 9.4 8.4 - 10.2 mg/dl    Total Protein 6.4 6.3 - 8.6 gm/dl    Albumin 3.3 (L) 3.4 - 4.8 gm/dl    Total Bilirubin 0.7 0.2 - 1.3 mg/dl    Alkaline Phosphatase 112 38 - 126 U/L    AST (SGOT) 65 (H) 14 - 36 U/L    ALT (SGPT) 54 (H) 9 - 52 U/L   Results for orders placed or performed during the hospital encounter of 11/13/15   Vitamin D 25 hydroxy   Result Value Ref Range    25 Hydroxy, Vitamin D 28.5 (L) 30.0 - 100.0 ng/ml   TSH   Result Value Ref Range    TSH 1.45 0.46 - 4.68 uIU/ml   Vitamin B12   Result Value Ref Range    Vitamin B-12 459 239 - 931 pg/ml   Results for orders placed or performed in visit on 01/23/15   Allergens, Zone 5   Result Value Ref Range    D. farinae (dust mite) <0.10 <=0.34 kU/L    D. pteronyssinus (dust mite) <0.10 <=0.34 kU/L    IMMUCAP Score See Note     [place] Grass <0.10 <=0.34 kU/L    [place] Grass <0.10 <=0.34 kU/L    IgE 91 <=214 kU/L    Alternaria tenuis <0.10 <=0.34 kU/L    Aspergillus fumigatus <0.10 <=0.34 kU/L    Hormodendrum hordei <0.10 <=0.34 kU/L    Penicillium chrysogen <0.10 <=0.34 kU/L    Birch <0.10 <=0.34 kU/L    [place], Pollen <0.10 <=0.34 kU/L    Maple/Box Elder <0.10 <=0.34 kU/L    Cottonseed <0.10 <=0.34 kU/L    Elm, American <0.10 <=0.34 kU/L    [place], Mountain <0.10 <=0.34 kU/L    [place] Tree <0.10 <=0.34 kU/L    [place], White <0.10 <=0.34 kU/L    [place], White <0.10 <=0.34 kU/L    Pigweed, Rough/Common <0.10 <=0.34 kU/L    Russian Thistle <0.10 <=0.34 kU/L    Ragweed, Common/Short <0.10 <=0.34 kU/L    Cat Epithellium <0.10 <=0.34 kU/L    Dog Dander, IgE <0.10 <=0.34 kU/L    Pecan/[place] Tree <0.10 <=0.34 kU/L    Cockroach,Bermudian <0.10 <=0.34 kU/L    Milk, Cow's <0.10 <=0.34 kU/L    Peanut <0.10  <=0.34 kU/L    Mouse Epithelial <0.10 <=0.34 kU/L    Mucor racemosus <0.10 <=0.34 kU/L    White Guy <0.10 <=0.34 kU/L    Sheep Sorrel <0.10 <=0.34 kU/L     *Note: Due to a large number of results and/or encounters for the requested time period, some results have not been displayed. A complete set of results can be found in Results Review.       Some portions of this note have been dictated using voice recognition software and may contain errors and/or omissions.

## 2018-01-26 ENCOUNTER — OFFICE VISIT (OUTPATIENT)
Dept: OTOLARYNGOLOGY | Facility: CLINIC | Age: 73
End: 2018-01-26

## 2018-01-26 VITALS — BODY MASS INDEX: 37.04 KG/M2 | WEIGHT: 236 LBS | HEIGHT: 67 IN | OXYGEN SATURATION: 97 %

## 2018-01-26 DIAGNOSIS — J31.0 RHINITIS MEDICAMENTOSA: ICD-10-CM

## 2018-01-26 DIAGNOSIS — T48.5X5A RHINITIS MEDICAMENTOSA: ICD-10-CM

## 2018-01-26 DIAGNOSIS — J32.4 CHRONIC PANSINUSITIS: Primary | ICD-10-CM

## 2018-01-26 DIAGNOSIS — J34.2 NASAL SEPTAL DEFORMITY: ICD-10-CM

## 2018-01-26 PROCEDURE — 31231 NASAL ENDOSCOPY DX: CPT | Performed by: OTOLARYNGOLOGY

## 2018-01-26 PROCEDURE — 99203 OFFICE O/P NEW LOW 30 MIN: CPT | Performed by: OTOLARYNGOLOGY

## 2018-01-26 RX ORDER — CEFUROXIME AXETIL 250 MG/1
500 TABLET ORAL 2 TIMES DAILY
Qty: 42 TABLET | Refills: 0 | Status: SHIPPED | OUTPATIENT
Start: 2018-01-26 | End: 2018-02-15 | Stop reason: DRUGHIGH

## 2018-01-26 RX ORDER — PREDNISONE 20 MG/1
TABLET ORAL
Qty: 12 TABLET | Refills: 0 | Status: SHIPPED | OUTPATIENT
Start: 2018-01-26 | End: 2018-03-20

## 2018-01-29 NOTE — PROGRESS NOTES
"Subjective   Pita Hyatt is a 72 y.o. female.   This is a consultation from Rose Marie PIKE  History of Present Illness   Patient reports that the right side of her face and nose have felt \"blocked\" since May 2017.  She is had multiple rounds of antibiotics which she stapled briefly help.  Unfortunately she is also using Afrin and has on a daily basis for at least the last couple of months.  She states that it one point her nose did briefly opened up and she blew \"to brown things\" out of her nose.  No previous nasal or sinus surgery.  Symptoms are present all day every day.  No vision change.      The following portions of the patient's history were reviewed and updated as appropriate: allergies, current medications, past family history, past medical history, past social history, past surgical history and problem list.      Pita Hyatt reports that she has quit smoking. She has never used smokeless tobacco. She reports that she does not drink alcohol or use illicit drugs.  Patient is not a tobacco user and has not been counseled for use of tobacco products    Family History   Problem Relation Age of Onset   • Hypertension Other    • Stroke Other    • Diabetes Other    • Ulcers Other    • Cancer Other        Allergies   Allergen Reactions   • Phenergan [Promethazine] Itching         Current Outpatient Prescriptions:   •  cetirizine-pseudoephedrine (ZyrTEC-D) 5-120 MG per 12 hr tablet, TAKE ONE TABLET BY MOUTH TWICE A DAY, Disp: 60 tablet, Rfl: 2  •  citalopram (CeleXA) 40 MG tablet, TAKE ONE TABLET BY MOUTH DAILY, Disp: 30 tablet, Rfl: 3  •  cycloSPORINE (RESTASIS) 0.05 % ophthalmic emulsion, Apply 1 drop to eye Every 12 (Twelve) Hours., Disp: 5.5 mL, Rfl: 5  •  doxepin (SINEquan) 25 MG capsule, TAKE ONE OR TWO CAPSULES BY MOUTH THREE TIMES A DAY AS NEEDED FOR ANXIETY ATTACKS, Disp: 30 capsule, Rfl: 3  •  fluticasone (FLOVENT DISKUS) 50 MCG/BLIST diskus inhaler, Inhale 2 puffs 1 (one) time daily., Disp: , " Rfl:   •  hyoscyamine sulfate (ANASPAZ) 0.125 MG tablet dispersible disintegrating tablet, Take 1 tablet by mouth Every 4 (Four) Hours As Needed (abd pain)., Disp: 45 tablet, Rfl: 3  •  IBUPROFEN PO, Take  by mouth every night., Disp: , Rfl:   •  levothyroxine (SYNTHROID, LEVOTHROID) 100 MCG tablet, TAKE ONE TABLET BY MOUTH DAILY, Disp: 30 tablet, Rfl: 3  •  meloxicam (MOBIC) 15 MG tablet, TAKE ONE TABLET BY MOUTH DAILY AS NEEDED FOR ARTHRITIS, Disp: 30 tablet, Rfl: 3  •  Omeprazole Magnesium 20.6 (20 BASE) MG capsule delayed-release, Take 20.6 mg by mouth Daily., Disp: , Rfl:   •  ONDANSETRON HCL PO, Take 1 tablet by mouth every 4 (four) hours., Disp: , Rfl:   •  thiamine (VITAMIN B-1) 100 MG tablet, Take 100 mg by mouth Daily., Disp: , Rfl:   •  vitamin D (ERGOCALCIFEROL) 66724 units capsule capsule, TAKE ONE CAPSULE BY MOUTH ONCE WEEKLY, Disp: 6 capsule, Rfl: 0  •  cefuroxime (CEFTIN) 250 MG tablet, Take 2 tablets by mouth 2 (Two) Times a Day., Disp: 42 tablet, Rfl: 0  •  predniSONE (DELTASONE) 20 MG tablet, 1 tab po tid x 2 days 1 tab po bid x 2 days 1 tab po qd x 2 days, Disp: 12 tablet, Rfl: 0    Past Medical History:   Diagnosis Date   • Acquired hypothyroidism    • Acute bronchitis    • Acute gastritis    • Acute maxillary sinusitis    • Acute maxillary sinusitis, unspecified    • Acute serous otitis media     R   • Acute sinusitis     Could be R mastoid    • Allergic rhinitis    • Anxiety state    • Chronic sinusitis    • Conjunctivitis    • Cough    • Degenerative joint disease of shoulder region    • Depressive disorder     improving   • Diarrhea    • Dysfunction of eustachian tube    • Epigastric pain    • Essential hypertension    • Female stress incontinence    • Forgetfulness    • Functional diarrhea    • Gynecological Examination    • Knee joint replaced by other means    • Malaise and fatigue    • Menopause    • Nausea and vomiting    • Other Chest pain    • Polyp of colon    • Preop examination,  unspecified    • Rhinitis medicamentosa    • Shoulder pain    • Skin tag- removal    • Subclinical hypothyroidism          Review of Systems   Gastrointestinal:        Heartburn   Genitourinary:        Nocturia  Urinary incontinence   Musculoskeletal: Positive for arthralgias.   All other systems reviewed and are negative.          Objective   Physical Exam  General: Well-developed well-nourished female in no acute distress.  Alert and oriented ×3. Head: Normocephalic. Face: Symmetrical strength and appearance. PERRL. EOMI. Voice:Strong. Speech:Fluent  Ears: External ears no deformity, canals no discharge, tympanic membranes intact clear and mobile bilaterally.  Nose: Nares show no discharge mass polyp or purulence.  Very swollen mucosa is present.  No gross external deformity.  Septum: To the right spur to the left  Oral cavity: Lips and gums without lesions.  Tongue and floor of mouth without lesions.  Parotid and submandibular ducts unobstructed.  No mucosal lesions on the buccal mucosa or vestibule of the mouth.  Pharynx: No erythema exudate mass or ulcer  Neck: No lymphadenopathy.  No thyromegaly.  Trachea and larynx midline.  No masses in the parotid or submandibular glands.    Nasal endoscopy is performed: Zay-Synephrine and Xylocaine are instilled the nares.  0° scope is passed into the nose.  On the left side there is swollen mucosa but no evidence of mass, polyp, purulence in the middle meatal cleft.  On the right side however there is markedly edematous mucosa and mucopurulent secretions extruding from the middle meatus.  No definite polyposis but the mucosa is quite swollen limiting visualization.    Assessment/Plan   [place] was seen today for sinus problem.    Diagnoses and all orders for this visit:    Chronic pansinusitis    Rhinitis medicamentosa    Nasal septal deformity      Plan: Explained to the patient that she must discontinue using Afrin due to its addictive nature.  I will prescribe Ceftin  500 mg by mouth twice a day ×3 weeks along with a six-day prednisone taper.  Patient is advised to return in 1 month and call sooner for problems.    My thanks to Ms. Kelley for this consultation

## 2018-02-15 ENCOUNTER — TELEPHONE (OUTPATIENT)
Dept: FAMILY MEDICINE CLINIC | Facility: CLINIC | Age: 73
End: 2018-02-15

## 2018-02-15 DIAGNOSIS — J32.0 CHRONIC MAXILLARY SINUSITIS: Primary | ICD-10-CM

## 2018-02-15 RX ORDER — CEFUROXIME AXETIL 500 MG/1
500 TABLET ORAL 2 TIMES DAILY
Qty: 20 TABLET | Refills: 0 | Status: SHIPPED | OUTPATIENT
Start: 2018-02-15 | End: 2018-03-20

## 2018-02-15 NOTE — TELEPHONE ENCOUNTER
Patient called has another sinus infection and has green drainage.  She is in [place] right now and wants to know if you can call her in an antibiotic to [facility] please.

## 2018-03-20 ENCOUNTER — OFFICE VISIT (OUTPATIENT)
Dept: OTOLARYNGOLOGY | Facility: CLINIC | Age: 73
End: 2018-03-20

## 2018-03-20 VITALS — HEART RATE: 79 BPM | WEIGHT: 230 LBS | OXYGEN SATURATION: 98 % | BODY MASS INDEX: 36.1 KG/M2 | HEIGHT: 67 IN

## 2018-03-20 DIAGNOSIS — J32.9 CHRONIC SINUSITIS, UNSPECIFIED LOCATION: Primary | ICD-10-CM

## 2018-03-20 DIAGNOSIS — J30.9 ALLERGIC RHINITIS: ICD-10-CM

## 2018-03-20 DIAGNOSIS — J32.4 CHRONIC PANSINUSITIS: ICD-10-CM

## 2018-03-20 DIAGNOSIS — J01.41 ACUTE RECURRENT PANSINUSITIS: Primary | ICD-10-CM

## 2018-03-20 DIAGNOSIS — J34.2 NASAL SEPTAL DEFORMITY: ICD-10-CM

## 2018-03-20 PROCEDURE — 99214 OFFICE O/P EST MOD 30 MIN: CPT | Performed by: OTOLARYNGOLOGY

## 2018-03-20 PROCEDURE — 31231 NASAL ENDOSCOPY DX: CPT | Performed by: OTOLARYNGOLOGY

## 2018-03-20 RX ORDER — FLUTICASONE PROPIONATE 50 MCG
2 SPRAY, SUSPENSION (ML) NASAL DAILY
COMMUNITY
End: 2020-06-19 | Stop reason: SDUPTHER

## 2018-03-20 RX ORDER — AMOXICILLIN AND CLAVULANATE POTASSIUM 875; 125 MG/1; MG/1
1 TABLET, FILM COATED ORAL EVERY 12 HOURS
Qty: 28 TABLET | Refills: 0 | Status: SHIPPED | OUTPATIENT
Start: 2018-03-20 | End: 2018-03-28 | Stop reason: SDUPTHER

## 2018-03-20 NOTE — PROGRESS NOTES
Subjective   Pita Hyatt is a 72 y.o. female.       History of Present Illness   Patient was seen previously with what appeared to be right-sided acute sinusitis she also had misuse of topical decongestants.  Was given a 3 week course of Ceftin and a 9 day prednisone taper.  Reports her while she was taking the prednisone her symptoms were much better but shortly after the antibiotics were finished she began getting congested again and is now completely blocked up on both sides of her nose and having mucopurulent discharge from the nose and postnasal drip.  She is having pressure in her mid face and around her eyes.  Patient states she has had these symptoms intermittently for more than a year now      The following portions of the patient's history were reviewed and updated as appropriate: allergies, current medications, past family history, past medical history, past social history, past surgical history and problem list.     reports that she has quit smoking. She has never used smokeless tobacco. She reports that she does not drink alcohol or use drugs.   Patient is not a tobacco user and has not been counseled for use of tobacco products      Review of Systems   Constitutional: Negative for fever.   HENT: Positive for congestion.            Objective   Physical Exam  General: Well-developed well-nourished female in no acute distress.  Alert and oriented ×3. Head: Normocephalic. Face: Symmetrical strength and appearance. PERRL. EOMI. Voice:Strong. Speech:Fluent  Ears: External ears no deformity, canals no discharge, tympanic membranes intact clear and mobile bilaterally.  Nose: Very swollen mucosa bilaterally.  Septum: To the right with spur to the left  Oral cavity: Lips and gums without lesions.  Tongue and floor of mouth without lesions.  Parotid and submandibular ducts unobstructed.  No mucosal lesions on the buccal mucosa or vestibule of the mouth.  Pharynx: No erythema exudate mass or ulcer  Neck: No  lymphadenopathy.  No thyromegaly.  Trachea and larynx midline.  No masses in the parotid or submandibular glands.    Nasal endoscopy is performed: Zay-Synephrine and Xylocaine are instilled the nares.  0° scope is passed into the nose.  There is noted to be mucopurulent secretions in the right middle meatal cleft the inferior turbinate is swollen.  Superior middle cleft is not visualized due to swelling.  On the left side all that is noted is swollen mucosa and mucopurulent secretions.  I cannot tell if there is a polyp present or this is just a markedly edematous middle turbinate.  Inferior turbinate is also quite swollen      Assessment/Plan   [place] was seen today for follow-up.    Diagnoses and all orders for this visit:    Acute recurrent pansinusitis    Nasal septal deformity    Chronic pansinusitis    Other orders  -     amoxicillin-clavulanate (AUGMENTIN) 875-125 MG per tablet; Take 1 tablet by mouth Every 12 (Twelve) Hours.      Plan: Prescribed Augmentin 875 g twice a day.  Schedule CT scan of the sinuses and [place] with Brain[facility] protocol.  Patient be seen the day of her CT scan and further treatment options of be discussed.

## 2018-03-22 RX ORDER — CETIRIZINE HYDROCHLORIDE, PSEUDOEPHEDRINE HYDROCHLORIDE 5; 120 MG/1; MG/1
TABLET, FILM COATED, EXTENDED RELEASE ORAL
Qty: 48 TABLET | Refills: 0 | Status: SHIPPED | OUTPATIENT
Start: 2018-03-22 | End: 2018-04-11

## 2018-03-28 ENCOUNTER — OFFICE VISIT (OUTPATIENT)
Dept: OTOLARYNGOLOGY | Facility: CLINIC | Age: 73
End: 2018-03-28

## 2018-03-28 ENCOUNTER — LAB (OUTPATIENT)
Dept: LAB | Facility: HOSPITAL | Age: 73
End: 2018-03-28
Attending: OTOLARYNGOLOGY

## 2018-03-28 ENCOUNTER — HOSPITAL ENCOUNTER (OUTPATIENT)
Dept: CT IMAGING | Facility: HOSPITAL | Age: 73
Discharge: HOME | End: 2018-03-28
Admitting: OTOLARYNGOLOGY

## 2018-03-28 VITALS — BODY MASS INDEX: 36.07 KG/M2 | TEMPERATURE: 98.4 F | WEIGHT: 229.8 LBS | HEIGHT: 67 IN

## 2018-03-28 DIAGNOSIS — J32.4 CHRONIC PANSINUSITIS: Primary | ICD-10-CM

## 2018-03-28 DIAGNOSIS — J32.9 CHRONIC SINUSITIS, UNSPECIFIED LOCATION: ICD-10-CM

## 2018-03-28 DIAGNOSIS — J34.2 NASAL SEPTAL DEFORMITY: ICD-10-CM

## 2018-03-28 LAB
ANION GAP SERPL CALCULATED.3IONS-SCNC: 13 MMOL/L (ref 5–15)
BUN BLD-MCNC: 12 MG/DL (ref 7–21)
BUN/CREAT SERPL: 18.8 (ref 7–25)
CALCIUM SPEC-SCNC: 9.6 MG/DL (ref 8.4–10.2)
CHLORIDE SERPL-SCNC: 104 MMOL/L (ref 95–110)
CO2 SERPL-SCNC: 26 MMOL/L (ref 22–31)
CREAT BLD-MCNC: 0.64 MG/DL (ref 0.5–1)
GFR SERPL CREATININE-BSD FRML MDRD: 91 ML/MIN/1.73 (ref 39–90)
GLUCOSE BLD-MCNC: 116 MG/DL (ref 60–100)
POTASSIUM BLD-SCNC: 3.9 MMOL/L (ref 3.5–5.1)
SODIUM BLD-SCNC: 143 MMOL/L (ref 137–145)

## 2018-03-28 PROCEDURE — 70486 CT MAXILLOFACIAL W/O DYE: CPT

## 2018-03-28 PROCEDURE — 36415 COLL VENOUS BLD VENIPUNCTURE: CPT

## 2018-03-28 PROCEDURE — 99214 OFFICE O/P EST MOD 30 MIN: CPT | Performed by: OTOLARYNGOLOGY

## 2018-03-28 PROCEDURE — 80048 BASIC METABOLIC PNL TOTAL CA: CPT

## 2018-03-28 RX ORDER — PREDNISONE 20 MG/1
TABLET ORAL
Qty: 12 TABLET | Refills: 0 | Status: SHIPPED | OUTPATIENT
Start: 2018-03-28 | End: 2018-05-01

## 2018-03-28 RX ORDER — AMOXICILLIN AND CLAVULANATE POTASSIUM 875; 125 MG/1; MG/1
1 TABLET, FILM COATED ORAL EVERY 12 HOURS
Qty: 28 TABLET | Refills: 0 | Status: SHIPPED | OUTPATIENT
Start: 2018-03-28 | End: 2018-05-01

## 2018-03-28 NOTE — PROGRESS NOTES
Subjective   Pita Hyatt is a 72 y.o. female.     History of Present Illness   Patient has been followed with sinusitis and mucopurulent discharge.  Had also initially admits use of topical decongestants.  Responded briefly to a 3 week course of Ceftin and 9 day course of prednisone but was seen last week once again with bilateral mucopurulent secretions.  She was placed on Augmentin.  Prednisone was not repeated because she had just received a course.  CT scan was obtained today which is personally reviewed by me, and shows diffuse pansinusitis involving the frontal, ethmoid, and maxillary sinuses bilaterally including some air-fluid levels.  Sphenoids are essentially unremarkable.  Did have some modest.  Apical lucency of the maxillary molars but did not appear to have erosion into the maxillary sinuses.  Patient reports she continues to have congestion and purulent rhinorrhea, mid facial pressure and headache.      The following portions of the patient's history were reviewed and updated as appropriate: allergies, current medications, past family history, past medical history, past social history, past surgical history and problem list.      Pita Hyatt reports that she has quit smoking. She has never used smokeless tobacco. She reports that she does not drink alcohol or use drugs.  Patient is not a tobacco user and has not been counseled for use of tobacco products    Family History   Problem Relation Age of Onset   • Hypertension Other    • Stroke Other    • Diabetes Other    • Ulcers Other    • Cancer Other        Allergies   Allergen Reactions   • Phenergan [Promethazine] Itching         Current Outpatient Prescriptions:   •  cetirizine-pseudoephedrine (ZyrTEC-D) 5-120 MG per 12 hr tablet, TAKE ONE TABLET BY MOUTH TWICE A DAY, Disp: 48 tablet, Rfl: 0  •  citalopram (CeleXA) 40 MG tablet, TAKE ONE TABLET BY MOUTH DAILY, Disp: 30 tablet, Rfl: 3  •  cycloSPORINE (RESTASIS) 0.05 % ophthalmic emulsion, Apply  1 drop to eye Every 12 (Twelve) Hours., Disp: 5.5 mL, Rfl: 5  •  doxepin (SINEquan) 25 MG capsule, TAKE ONE OR TWO CAPSULES BY MOUTH THREE TIMES A DAY AS NEEDED FOR ANXIETY ATTACKS, Disp: 30 capsule, Rfl: 3  •  fluticasone (FLONASE) 50 MCG/ACT nasal spray, 2 sprays into each nostril Daily., Disp: , Rfl:   •  fluticasone (FLOVENT DISKUS) 50 MCG/BLIST diskus inhaler, Inhale 2 puffs 1 (one) time daily., Disp: , Rfl:   •  hyoscyamine sulfate (ANASPAZ) 0.125 MG tablet dispersible disintegrating tablet, Take 1 tablet by mouth Every 4 (Four) Hours As Needed (abd pain)., Disp: 45 tablet, Rfl: 3  •  IBUPROFEN PO, Take  by mouth every night., Disp: , Rfl:   •  levothyroxine (SYNTHROID, LEVOTHROID) 100 MCG tablet, TAKE ONE TABLET BY MOUTH DAILY, Disp: 30 tablet, Rfl: 3  •  meloxicam (MOBIC) 15 MG tablet, TAKE ONE TABLET BY MOUTH DAILY AS NEEDED FOR ARTHRITIS, Disp: 30 tablet, Rfl: 3  •  Omeprazole Magnesium 20.6 (20 BASE) MG capsule delayed-release, Take 20.6 mg by mouth Daily., Disp: , Rfl:   •  ONDANSETRON HCL PO, Take 1 tablet by mouth every 4 (four) hours., Disp: , Rfl:   •  thiamine (VITAMIN B-1) 100 MG tablet, Take 100 mg by mouth Daily., Disp: , Rfl:   •  vitamin D (ERGOCALCIFEROL) 31321 units capsule capsule, TAKE ONE CAPSULE BY MOUTH ONCE WEEKLY, Disp: 6 capsule, Rfl: 0  •  amoxicillin-clavulanate (AUGMENTIN) 875-125 MG per tablet, Take 1 tablet by mouth Every 12 (Twelve) Hours., Disp: 28 tablet, Rfl: 0  •  predniSONE (DELTASONE) 20 MG tablet, Take 1 PO TID x 2 days, 1 PO BID x 2 days, 1 PO QD x 2 days then stop, Disp: 12 tablet, Rfl: 0    Past Medical History:   Diagnosis Date   • Acquired hypothyroidism    • Acute bronchitis    • Acute gastritis    • Acute maxillary sinusitis    • Acute maxillary sinusitis, unspecified    • Acute serous otitis media     R   • Acute sinusitis     Could be R mastoid    • Allergic rhinitis    • Anxiety state    • Chronic sinusitis    • Conjunctivitis    • Cough    • Degenerative joint  disease of shoulder region    • Depressive disorder     improving   • Diarrhea    • Dysfunction of eustachian tube    • Epigastric pain    • Essential hypertension    • Female stress incontinence    • Forgetfulness    • Functional diarrhea    • Gynecological Examination    • Knee joint replaced by other means    • Malaise and fatigue    • Menopause    • Nausea and vomiting    • Other Chest pain    • Polyp of colon    • Preop examination, unspecified    • Rhinitis medicamentosa    • Shoulder pain    • Skin tag- removal    • Subclinical hypothyroidism          Review of Systems   HENT: Positive for congestion and rhinorrhea.    Neurological: Positive for headaches.           Objective   Physical Exam  General: Well-developed well-nourished female in no acute distress.  Alert and oriented ×3. Head: Normocephalic. Face: Symmetrical strength and appearance. PERRL. EOMI. Voice:Strong. Speech:Fluent  Ears: External ears no deformity, canals no discharge, tympanic membranes intact clear and mobile bilaterally.  Nose: Nares show swollen mucosa with visible mucoid discharge in the left naris.  No gross external deformity.  Septum: To the right superiorly with a spur to the left inferiorly  Oral cavity: Lips and gums without lesions.  Tongue and floor of mouth without lesions.  Parotid and submandibular ducts unobstructed.  No mucosal lesions on the buccal mucosa or vestibule of the mouth.  Pharynx: No erythema exudate mass or ulcer  Neck: No lymphadenopathy.  No thyromegaly.  Trachea and larynx midline.  No masses in the parotid or submandibular glands.  Chest: Clear.  Heart: Regular.  Abdomen: Benign.      Assessment/Plan   [place] was seen today for results.    Diagnoses and all orders for this visit:    Chronic pansinusitis    Nasal septal deformity      Plan: Presented the patient the option of proceeding with bilateral endoscopic complete ethmoidectomy, maxillary sinus antrostomy, frontal recess exploration, with  septoplasty if septal deformity significantly impeded surgical access to the sinuses.  I explained the nature of this procedure to the patient in layman's terms including need for general anesthetic and risks including bleeding, damage to the eyes, which could result in vision change or vision loss, damage to the base of brain which could represent result in leakage of spinal fluid and possible need for further surgery, failure to improve symptoms, and need for postoperative debridements which may be quite painful.  I have explained that if the septoplasty is performed there is also the risk of hole in the nasal septum numbness of the anterior palate unwanted change in nasal appearance.  Proposed benefit would be drainage of acute infection which would hopefully lead to resolution of acute infection and decreased frequency and severity of chronic sinus symptoms.  The alternative would be continued medical therapy which was also discussed and offered.  Patient voices understanding of all of the above and wishes to proceed with surgery, but states she needs to wait until at least mid April, therefore an additional 2 weeks of Augmentin will be sent to her pharmacy.  We'll also send a prescription for prednisone to begin 60 mg a day 3 days prior to surgery tapering until 3 days after surgery.  In the meantime she is also to continue to use nasal saline spray frequently.

## 2018-03-29 PROBLEM — J32.4 CHRONIC PANSINUSITIS: Status: ACTIVE | Noted: 2018-03-29

## 2018-03-29 PROBLEM — J34.2 NASAL SEPTAL DEFORMITY: Status: ACTIVE | Noted: 2018-03-29

## 2018-04-11 ENCOUNTER — APPOINTMENT (OUTPATIENT)
Dept: PREADMISSION TESTING | Facility: HOSPITAL | Age: 73
End: 2018-04-11

## 2018-04-11 VITALS
DIASTOLIC BLOOD PRESSURE: 62 MMHG | WEIGHT: 232 LBS | RESPIRATION RATE: 16 BRPM | OXYGEN SATURATION: 98 % | BODY MASS INDEX: 36.41 KG/M2 | HEIGHT: 67 IN | HEART RATE: 80 BPM | SYSTOLIC BLOOD PRESSURE: 116 MMHG

## 2018-04-11 RX ORDER — ERGOCALCIFEROL 1.25 MG/1
50000 CAPSULE ORAL WEEKLY
COMMUNITY
End: 2018-04-26 | Stop reason: SDUPTHER

## 2018-04-11 RX ORDER — MELOXICAM 15 MG/1
15 TABLET ORAL DAILY
COMMUNITY
End: 2018-05-21 | Stop reason: SDUPTHER

## 2018-04-11 RX ORDER — DOXEPIN HYDROCHLORIDE 25 MG/1
25 CAPSULE ORAL 3 TIMES DAILY PRN
COMMUNITY
End: 2018-11-27 | Stop reason: HOSPADM

## 2018-04-11 RX ORDER — OMEPRAZOLE 20 MG/1
20 CAPSULE, DELAYED RELEASE ORAL DAILY
COMMUNITY
End: 2020-06-19 | Stop reason: SDUPTHER

## 2018-04-11 RX ORDER — LEVOTHYROXINE SODIUM 0.1 MG/1
100 TABLET ORAL DAILY
COMMUNITY
End: 2018-05-21 | Stop reason: SDUPTHER

## 2018-04-11 RX ORDER — CITALOPRAM 40 MG/1
40 TABLET ORAL DAILY
COMMUNITY
End: 2018-05-21 | Stop reason: SDUPTHER

## 2018-04-15 ENCOUNTER — ANESTHESIA EVENT (OUTPATIENT)
Dept: PERIOP | Facility: HOSPITAL | Age: 73
End: 2018-04-15

## 2018-04-16 ENCOUNTER — ANESTHESIA (OUTPATIENT)
Dept: PERIOP | Facility: HOSPITAL | Age: 73
End: 2018-04-16

## 2018-04-16 ENCOUNTER — HOSPITAL ENCOUNTER (OUTPATIENT)
Facility: HOSPITAL | Age: 73
Setting detail: HOSPITAL OUTPATIENT SURGERY
Discharge: HOME | End: 2018-04-16
Attending: OTOLARYNGOLOGY | Admitting: OTOLARYNGOLOGY

## 2018-04-16 VITALS
HEART RATE: 70 BPM | TEMPERATURE: 97.4 F | WEIGHT: 227.51 LBS | OXYGEN SATURATION: 96 % | BODY MASS INDEX: 35.71 KG/M2 | SYSTOLIC BLOOD PRESSURE: 141 MMHG | HEIGHT: 67 IN | RESPIRATION RATE: 20 BRPM | DIASTOLIC BLOOD PRESSURE: 68 MMHG

## 2018-04-16 DIAGNOSIS — J34.2 NASAL SEPTAL DEFORMITY: ICD-10-CM

## 2018-04-16 DIAGNOSIS — J32.4 CHRONIC PANSINUSITIS: ICD-10-CM

## 2018-04-16 PROCEDURE — 25010000002 FENTANYL CITRATE (PF) 100 MCG/2ML SOLUTION: Performed by: NURSE ANESTHETIST, CERTIFIED REGISTERED

## 2018-04-16 PROCEDURE — 87015 SPECIMEN INFECT AGNT CONCNTJ: CPT | Performed by: OTOLARYNGOLOGY

## 2018-04-16 PROCEDURE — 25010000002 HYDROMORPHONE PER 4 MG: Performed by: ANESTHESIOLOGY

## 2018-04-16 PROCEDURE — 31276 NSL/SINS NDSC FRNT TISS RMVL: CPT | Performed by: OTOLARYNGOLOGY

## 2018-04-16 PROCEDURE — 87075 CULTR BACTERIA EXCEPT BLOOD: CPT | Performed by: OTOLARYNGOLOGY

## 2018-04-16 PROCEDURE — 25010000002 ONDANSETRON PER 1 MG: Performed by: NURSE ANESTHETIST, CERTIFIED REGISTERED

## 2018-04-16 PROCEDURE — 25010000002 PHENYLEPHRINE PER 1 ML: Performed by: NURSE ANESTHETIST, CERTIFIED REGISTERED

## 2018-04-16 PROCEDURE — 88305 TISSUE EXAM BY PATHOLOGIST: CPT | Performed by: PATHOLOGY

## 2018-04-16 PROCEDURE — 87205 SMEAR GRAM STAIN: CPT | Performed by: OTOLARYNGOLOGY

## 2018-04-16 PROCEDURE — 88312 SPECIAL STAINS GROUP 1: CPT | Performed by: OTOLARYNGOLOGY

## 2018-04-16 PROCEDURE — 25010000002 MIDAZOLAM PER 1 MG: Performed by: NURSE ANESTHETIST, CERTIFIED REGISTERED

## 2018-04-16 PROCEDURE — 25010000002 PROPOFOL 10 MG/ML EMULSION: Performed by: NURSE ANESTHETIST, CERTIFIED REGISTERED

## 2018-04-16 PROCEDURE — 88312 SPECIAL STAINS GROUP 1: CPT | Performed by: PATHOLOGY

## 2018-04-16 PROCEDURE — 31256 EXPLORATION MAXILLARY SINUS: CPT | Performed by: OTOLARYNGOLOGY

## 2018-04-16 PROCEDURE — 88311 DECALCIFY TISSUE: CPT | Performed by: PATHOLOGY

## 2018-04-16 PROCEDURE — 25010000002 NEOSTIGMINE PER 0.5 MG: Performed by: NURSE ANESTHETIST, CERTIFIED REGISTERED

## 2018-04-16 PROCEDURE — 88305 TISSUE EXAM BY PATHOLOGIST: CPT | Performed by: OTOLARYNGOLOGY

## 2018-04-16 PROCEDURE — 25010000002 DEXAMETHASONE PER 1 MG: Performed by: NURSE ANESTHETIST, CERTIFIED REGISTERED

## 2018-04-16 PROCEDURE — 87070 CULTURE OTHR SPECIMN AEROBIC: CPT | Performed by: OTOLARYNGOLOGY

## 2018-04-16 PROCEDURE — 31255 NSL/SINS NDSC W/TOT ETHMDCT: CPT | Performed by: OTOLARYNGOLOGY

## 2018-04-16 PROCEDURE — 88311 DECALCIFY TISSUE: CPT | Performed by: OTOLARYNGOLOGY

## 2018-04-16 RX ORDER — FENTANYL CITRATE 50 UG/ML
INJECTION, SOLUTION INTRAMUSCULAR; INTRAVENOUS AS NEEDED
Status: DISCONTINUED | OUTPATIENT
Start: 2018-04-16 | End: 2018-04-16 | Stop reason: SURG

## 2018-04-16 RX ORDER — GLYCOPYRROLATE 0.2 MG/ML
INJECTION INTRAMUSCULAR; INTRAVENOUS AS NEEDED
Status: DISCONTINUED | OUTPATIENT
Start: 2018-04-16 | End: 2018-04-16 | Stop reason: SURG

## 2018-04-16 RX ORDER — ONDANSETRON 4 MG/1
4 TABLET, ORALLY DISINTEGRATING ORAL EVERY 8 HOURS PRN
Qty: 10 TABLET | Refills: 1 | Status: SHIPPED | OUTPATIENT
Start: 2018-04-16 | End: 2018-05-01

## 2018-04-16 RX ORDER — PROPOFOL 10 MG/ML
VIAL (ML) INTRAVENOUS AS NEEDED
Status: DISCONTINUED | OUTPATIENT
Start: 2018-04-16 | End: 2018-04-16 | Stop reason: SURG

## 2018-04-16 RX ORDER — DEXAMETHASONE SODIUM PHOSPHATE 4 MG/ML
INJECTION, SOLUTION INTRA-ARTICULAR; INTRALESIONAL; INTRAMUSCULAR; INTRAVENOUS; SOFT TISSUE AS NEEDED
Status: DISCONTINUED | OUTPATIENT
Start: 2018-04-16 | End: 2018-04-16 | Stop reason: SURG

## 2018-04-16 RX ORDER — MEPERIDINE HYDROCHLORIDE 50 MG/ML
12.5 INJECTION INTRAMUSCULAR; INTRAVENOUS; SUBCUTANEOUS
Status: DISCONTINUED | OUTPATIENT
Start: 2018-04-16 | End: 2018-04-16 | Stop reason: HOSPADM

## 2018-04-16 RX ORDER — OXYMETAZOLINE HYDROCHLORIDE 0.05 G/100ML
SPRAY NASAL AS NEEDED
Status: DISCONTINUED | OUTPATIENT
Start: 2018-04-16 | End: 2018-04-16 | Stop reason: HOSPADM

## 2018-04-16 RX ORDER — SODIUM CHLORIDE, SODIUM GLUCONATE, SODIUM ACETATE, POTASSIUM CHLORIDE, AND MAGNESIUM CHLORIDE 526; 502; 368; 37; 30 MG/100ML; MG/100ML; MG/100ML; MG/100ML; MG/100ML
1000 INJECTION, SOLUTION INTRAVENOUS CONTINUOUS
Status: DISCONTINUED | OUTPATIENT
Start: 2018-04-16 | End: 2018-04-16 | Stop reason: HOSPADM

## 2018-04-16 RX ORDER — SODIUM CHLORIDE 0.9 % (FLUSH) 0.9 %
3 SYRINGE (ML) INJECTION AS NEEDED
Status: DISCONTINUED | OUTPATIENT
Start: 2018-04-16 | End: 2018-04-16 | Stop reason: HOSPADM

## 2018-04-16 RX ORDER — LIDOCAINE HYDROCHLORIDE AND EPINEPHRINE 10; 10 MG/ML; UG/ML
INJECTION, SOLUTION INFILTRATION; PERINEURAL AS NEEDED
Status: DISCONTINUED | OUTPATIENT
Start: 2018-04-16 | End: 2018-04-16 | Stop reason: HOSPADM

## 2018-04-16 RX ORDER — OXYMETAZOLINE HYDROCHLORIDE 0.05 G/100ML
3 SPRAY NASAL ONCE
Status: COMPLETED | OUTPATIENT
Start: 2018-04-16 | End: 2018-04-16

## 2018-04-16 RX ORDER — EPHEDRINE SULFATE 50 MG/ML
INJECTION, SOLUTION INTRAVENOUS AS NEEDED
Status: DISCONTINUED | OUTPATIENT
Start: 2018-04-16 | End: 2018-04-16 | Stop reason: SURG

## 2018-04-16 RX ORDER — LIDOCAINE HYDROCHLORIDE 20 MG/ML
INJECTION, SOLUTION INFILTRATION; PERINEURAL AS NEEDED
Status: DISCONTINUED | OUTPATIENT
Start: 2018-04-16 | End: 2018-04-16 | Stop reason: SURG

## 2018-04-16 RX ORDER — ONDANSETRON 2 MG/ML
4 INJECTION INTRAMUSCULAR; INTRAVENOUS ONCE AS NEEDED
Status: COMPLETED | OUTPATIENT
Start: 2018-04-16 | End: 2018-04-16

## 2018-04-16 RX ORDER — OXYCODONE HYDROCHLORIDE AND ACETAMINOPHEN 5; 325 MG/1; MG/1
1-2 TABLET ORAL EVERY 4 HOURS PRN
Qty: 40 TABLET | Refills: 0 | Status: SHIPPED | OUTPATIENT
Start: 2018-04-16 | End: 2018-04-30

## 2018-04-16 RX ORDER — CEFUROXIME AXETIL 250 MG/1
250 TABLET ORAL 2 TIMES DAILY
Qty: 20 TABLET | Refills: 0 | Status: SHIPPED | OUTPATIENT
Start: 2018-04-16 | End: 2018-05-01

## 2018-04-16 RX ORDER — LIDOCAINE HYDROCHLORIDE 10 MG/ML
0.5 INJECTION, SOLUTION INFILTRATION; PERINEURAL ONCE AS NEEDED
Status: DISCONTINUED | OUTPATIENT
Start: 2018-04-16 | End: 2018-04-16 | Stop reason: HOSPADM

## 2018-04-16 RX ORDER — ONDANSETRON 2 MG/ML
INJECTION INTRAMUSCULAR; INTRAVENOUS AS NEEDED
Status: DISCONTINUED | OUTPATIENT
Start: 2018-04-16 | End: 2018-04-16 | Stop reason: SURG

## 2018-04-16 RX ORDER — MIDAZOLAM HYDROCHLORIDE 1 MG/ML
INJECTION INTRAMUSCULAR; INTRAVENOUS AS NEEDED
Status: DISCONTINUED | OUTPATIENT
Start: 2018-04-16 | End: 2018-04-16 | Stop reason: SURG

## 2018-04-16 RX ORDER — ROCURONIUM BROMIDE 10 MG/ML
INJECTION, SOLUTION INTRAVENOUS AS NEEDED
Status: DISCONTINUED | OUTPATIENT
Start: 2018-04-16 | End: 2018-04-16 | Stop reason: SURG

## 2018-04-16 RX ORDER — OXYCODONE HYDROCHLORIDE AND ACETAMINOPHEN 5; 325 MG/1; MG/1
1 TABLET ORAL EVERY 4 HOURS PRN
Status: DISCONTINUED | OUTPATIENT
Start: 2018-04-16 | End: 2018-04-16 | Stop reason: HOSPADM

## 2018-04-16 RX ORDER — ONDANSETRON 2 MG/ML
4 INJECTION INTRAMUSCULAR; INTRAVENOUS ONCE AS NEEDED
Status: DISCONTINUED | OUTPATIENT
Start: 2018-04-16 | End: 2018-04-16 | Stop reason: HOSPADM

## 2018-04-16 RX ADMIN — HYDROMORPHONE HYDROCHLORIDE 0.5 MG: 1 INJECTION, SOLUTION INTRAMUSCULAR; INTRAVENOUS; SUBCUTANEOUS at 12:59

## 2018-04-16 RX ADMIN — ROCURONIUM BROMIDE 50 MG: 10 INJECTION INTRAVENOUS at 10:48

## 2018-04-16 RX ADMIN — Medication 3 MG: at 12:26

## 2018-04-16 RX ADMIN — PHENYLEPHRINE HYDROCHLORIDE 100 MCG: 10 INJECTION INTRAVENOUS at 11:21

## 2018-04-16 RX ADMIN — ONDANSETRON HYDROCHLORIDE 4 MG: 2 INJECTION INTRAMUSCULAR; INTRAVENOUS at 12:49

## 2018-04-16 RX ADMIN — ONDANSETRON 4 MG: 2 INJECTION INTRAMUSCULAR; INTRAVENOUS at 12:23

## 2018-04-16 RX ADMIN — SODIUM CHLORIDE, SODIUM GLUCONATE, SODIUM ACETATE, POTASSIUM CHLORIDE, AND MAGNESIUM CHLORIDE 1000 ML: 526; 502; 368; 37; 30 INJECTION, SOLUTION INTRAVENOUS at 10:04

## 2018-04-16 RX ADMIN — Medication 1 MG: at 12:32

## 2018-04-16 RX ADMIN — GLYCOPYRROLATE 0.2 MG: 0.2 INJECTION, SOLUTION INTRAMUSCULAR; INTRAVENOUS at 12:32

## 2018-04-16 RX ADMIN — PROPOFOL 150 MG: 10 INJECTION, EMULSION INTRAVENOUS at 10:46

## 2018-04-16 RX ADMIN — DEXAMETHASONE SODIUM PHOSPHATE 4 MG: 4 INJECTION, SOLUTION INTRAMUSCULAR; INTRAVENOUS at 11:43

## 2018-04-16 RX ADMIN — MIDAZOLAM 2 MG: 1 INJECTION INTRAMUSCULAR; INTRAVENOUS at 10:34

## 2018-04-16 RX ADMIN — FENTANYL CITRATE 50 MCG: 50 INJECTION, SOLUTION INTRAMUSCULAR; INTRAVENOUS at 10:46

## 2018-04-16 RX ADMIN — OXYMETAZOLINE HYDROCHLORIDE 3 SPRAY: 5 SPRAY NASAL at 10:10

## 2018-04-16 RX ADMIN — ROCURONIUM BROMIDE 10 MG: 10 INJECTION INTRAVENOUS at 12:02

## 2018-04-16 RX ADMIN — EPHEDRINE SULFATE 10 MG: 50 INJECTION INTRAVENOUS at 11:42

## 2018-04-16 RX ADMIN — HYDROMORPHONE HYDROCHLORIDE 0.5 MG: 1 INJECTION, SOLUTION INTRAMUSCULAR; INTRAVENOUS; SUBCUTANEOUS at 12:48

## 2018-04-16 RX ADMIN — GLYCOPYRROLATE 0.4 MG: 0.2 INJECTION, SOLUTION INTRAMUSCULAR; INTRAVENOUS at 12:26

## 2018-04-16 RX ADMIN — LIDOCAINE HYDROCHLORIDE 100 MG: 20 INJECTION, SOLUTION INFILTRATION; PERINEURAL at 10:45

## 2018-04-16 NOTE — ANESTHESIA PREPROCEDURE EVALUATION
Anesthesia Evaluation     no history of anesthetic complications:  NPO Solid Status: > 8 hours  NPO Liquid Status: > 2 hours           Airway   Mallampati: II  TM distance: >3 FB  Neck ROM: limited  Possible difficult intubation  Dental    (+) poor dentition        Pulmonary - normal exam    breath sounds clear to auscultation  (+) recent URI,   (-) not a smoker    ROS comment: Seasonal allergies  Sinus infxn  snores  Cardiovascular - normal exam  Exercise tolerance: good (4-7 METS)    PT is on anticoagulation therapy  Rhythm: regular  Rate: normal    (-) angina      Neuro/Psych  (+) psychiatric history Anxiety and Depression,     GI/Hepatic/Renal/Endo    (+) obesity,  GERD well controlled,  hypothyroidism,     Musculoskeletal     Abdominal   (+) obese,    Substance History   (+) alcohol use (rarely),      OB/GYN          Other   (+) arthritis (knees and shoulders)     (-) history of cancer                  Anesthesia Plan    ASA 3     general   (Patient states IV blew during colonoscopy and she woke up in the past)  intravenous induction   Anesthetic plan and risks discussed with patient.

## 2018-04-16 NOTE — BRIEF OP NOTE
SINOSCOPY PROCEDURE WITH BRAINLAB  Progress Note    Pita Villareal Emry  4/16/2018    Pre-op Diagnosis:   Chronic pansinusitis [J32.4]  Nasal septal deformity [J34.2]       Post-Op Diagnosis Codes:     * Chronic pansinusitis [J32.4]     * Nasal septal deformity [J34.2]    Procedure/CPT® Codes:      Procedure(s):  BILATERAL ENDOSCOPIC EHTMOIDECTOMY, MAXILLARY SINUS ANTROSTOMY, FRONTAL RECESS EXPLORATION, POSSIBLE SEPTOPLASY         BRAINLAB    Surgeon(s):  Daniel Griffith MD    Anesthesia: General    Staff:   Circulator: Judit Riggins RN  Scrub Person: Remigio Worley RN; Paulette Lion  Assistant: Kena Royal    Estimated Blood Loss: 200 mL    Urine Voided: * No values recorded between 4/16/2018 10:39 AM and 4/16/2018 12:27 PM *    Specimens:                ID Type Source Tests Collected by Time   1 : right maxillary sinus asperate FOR AEROBIC AND ANAROBIC CULTURE Body Fluid Sinus, maxillary right ANAEROBIC CULTURE, BODY FLUID CULTURE Daniel Griffith MD 4/16/2018 1158   A :  Tissue Sinus, ethmoid right TISSUE PATHOLOGY EXAM Daniel Griffith MD 4/16/2018 1226   B :  Tissue Sinus, ethmoid left TISSUE PATHOLOGY EXAM Daniel Griffith MD 4/16/2018 1226         Drains:      Findings: Chronically inflamed diseased appearing mucosa throughout the paranasal sinuses with mucoid secretions in the left maxillary sinus and polypoid change in the ethmoid sinuses bilaterally.  Overt purulence was noted in the left frontal and right maxillary sinus.    Complications: None      Daniel Griffith MD     Date: 4/16/2018  Time: 12:28 PM

## 2018-04-16 NOTE — ANESTHESIA PROCEDURE NOTES
Airway  Date/Time: 4/16/2018 10:50 AM  End Time:4/16/2018 10:51 AM  Difficult airway    General Information and Staff    Patient location during procedure: OR  CRNA: DARIUS JACKSON    Indications and Patient Condition  Indications for airway management: airway protection    Preoxygenated: yes  MILS maintained throughout  Mask difficulty assessment: 2 - vent by mask + OA or adjuvant +/- NMBA    Final Airway Details  Final airway type: endotracheal airway      Successful airway: ETT  Cuffed: yes   Successful intubation technique: video laryngoscopy  Facilitating devices/methods: cricoid pressure and Bougie  Endotracheal tube insertion site: oral  Blade: Trista  Blade size: #3  ETT size: 7.0 mm  Cormack-Lehane Classification: grade IIa - partial view of glottis  Placement verified by: chest auscultation and capnometry   Cuff volume (mL): 8  Measured from: lips  ETT to lips (cm): 21  Number of attempts at approach: 1

## 2018-04-16 NOTE — ANESTHESIA POSTPROCEDURE EVALUATION
Patient: Pita Hyatt    Procedure Summary     Date:  04/16/18 Room / Location:  [facility] OR 08 / [facility] OR    Anesthesia Start:  1039 Anesthesia Stop:  1237    Procedure:  BILATERAL ENDOSCOPIC EHTMOIDECTOMY, MAXILLARY SINUS ANTROSTOMY, FRONTAL RECESS EXPLORATION, POSSIBLE SEPTOPLASY         BRAINLAB (Bilateral ) Diagnosis:       Chronic pansinusitis      Nasal septal deformity      (Chronic pansinusitis [J32.4])      (Nasal septal deformity [J34.2])    Surgeon:  Daniel Griffith MD Provider:  Guille Cheng MD    Anesthesia Type:  general ASA Status:  3          Anesthesia Type: general  Last vitals  BP   166/74 (04/16/18 0924)   Temp   97.2 °F (36.2 °C) (04/16/18 0924)   Pulse   75 (04/16/18 0924)   Resp   18 (04/16/18 0924)     SpO2   99 % (04/16/18 0924)     Post Anesthesia Care and Evaluation    Patient location during evaluation: PACU  Patient participation: complete - patient participated  Level of consciousness: awake  Pain score: 0  Pain management: adequate  Airway patency: patent  Anesthetic complications: No anesthetic complications  PONV Status: none  Cardiovascular status: acceptable  Respiratory status: acceptable  Hydration status: acceptable

## 2018-04-16 NOTE — H&P (VIEW-ONLY)
Subjective   Pita Hyatt is a 72 y.o. female.     History of Present Illness   Patient has been followed with sinusitis and mucopurulent discharge.  Had also initially admits use of topical decongestants.  Responded briefly to a 3 week course of Ceftin and 9 day course of prednisone but was seen last week once again with bilateral mucopurulent secretions.  She was placed on Augmentin.  Prednisone was not repeated because she had just received a course.  CT scan was obtained today which is personally reviewed by me, and shows diffuse pansinusitis involving the frontal, ethmoid, and maxillary sinuses bilaterally including some air-fluid levels.  Sphenoids are essentially unremarkable.  Did have some modest.  Apical lucency of the maxillary molars but did not appear to have erosion into the maxillary sinuses.  Patient reports she continues to have congestion and purulent rhinorrhea, mid facial pressure and headache.      The following portions of the patient's history were reviewed and updated as appropriate: allergies, current medications, past family history, past medical history, past social history, past surgical history and problem list.      Pita Hyatt reports that she has quit smoking. She has never used smokeless tobacco. She reports that she does not drink alcohol or use drugs.  Patient is not a tobacco user and has not been counseled for use of tobacco products    Family History   Problem Relation Age of Onset   • Hypertension Other    • Stroke Other    • Diabetes Other    • Ulcers Other    • Cancer Other        Allergies   Allergen Reactions   • Phenergan [Promethazine] Itching         Current Outpatient Prescriptions:   •  cetirizine-pseudoephedrine (ZyrTEC-D) 5-120 MG per 12 hr tablet, TAKE ONE TABLET BY MOUTH TWICE A DAY, Disp: 48 tablet, Rfl: 0  •  citalopram (CeleXA) 40 MG tablet, TAKE ONE TABLET BY MOUTH DAILY, Disp: 30 tablet, Rfl: 3  •  cycloSPORINE (RESTASIS) 0.05 % ophthalmic emulsion, Apply  1 drop to eye Every 12 (Twelve) Hours., Disp: 5.5 mL, Rfl: 5  •  doxepin (SINEquan) 25 MG capsule, TAKE ONE OR TWO CAPSULES BY MOUTH THREE TIMES A DAY AS NEEDED FOR ANXIETY ATTACKS, Disp: 30 capsule, Rfl: 3  •  fluticasone (FLONASE) 50 MCG/ACT nasal spray, 2 sprays into each nostril Daily., Disp: , Rfl:   •  fluticasone (FLOVENT DISKUS) 50 MCG/BLIST diskus inhaler, Inhale 2 puffs 1 (one) time daily., Disp: , Rfl:   •  hyoscyamine sulfate (ANASPAZ) 0.125 MG tablet dispersible disintegrating tablet, Take 1 tablet by mouth Every 4 (Four) Hours As Needed (abd pain)., Disp: 45 tablet, Rfl: 3  •  IBUPROFEN PO, Take  by mouth every night., Disp: , Rfl:   •  levothyroxine (SYNTHROID, LEVOTHROID) 100 MCG tablet, TAKE ONE TABLET BY MOUTH DAILY, Disp: 30 tablet, Rfl: 3  •  meloxicam (MOBIC) 15 MG tablet, TAKE ONE TABLET BY MOUTH DAILY AS NEEDED FOR ARTHRITIS, Disp: 30 tablet, Rfl: 3  •  Omeprazole Magnesium 20.6 (20 BASE) MG capsule delayed-release, Take 20.6 mg by mouth Daily., Disp: , Rfl:   •  ONDANSETRON HCL PO, Take 1 tablet by mouth every 4 (four) hours., Disp: , Rfl:   •  thiamine (VITAMIN B-1) 100 MG tablet, Take 100 mg by mouth Daily., Disp: , Rfl:   •  vitamin D (ERGOCALCIFEROL) 71117 units capsule capsule, TAKE ONE CAPSULE BY MOUTH ONCE WEEKLY, Disp: 6 capsule, Rfl: 0  •  amoxicillin-clavulanate (AUGMENTIN) 875-125 MG per tablet, Take 1 tablet by mouth Every 12 (Twelve) Hours., Disp: 28 tablet, Rfl: 0  •  predniSONE (DELTASONE) 20 MG tablet, Take 1 PO TID x 2 days, 1 PO BID x 2 days, 1 PO QD x 2 days then stop, Disp: 12 tablet, Rfl: 0    Past Medical History:   Diagnosis Date   • Acquired hypothyroidism    • Acute bronchitis    • Acute gastritis    • Acute maxillary sinusitis    • Acute maxillary sinusitis, unspecified    • Acute serous otitis media     R   • Acute sinusitis     Could be R mastoid    • Allergic rhinitis    • Anxiety state    • Chronic sinusitis    • Conjunctivitis    • Cough    • Degenerative joint  disease of shoulder region    • Depressive disorder     improving   • Diarrhea    • Dysfunction of eustachian tube    • Epigastric pain    • Essential hypertension    • Female stress incontinence    • Forgetfulness    • Functional diarrhea    • Gynecological Examination    • Knee joint replaced by other means    • Malaise and fatigue    • Menopause    • Nausea and vomiting    • Other Chest pain    • Polyp of colon    • Preop examination, unspecified    • Rhinitis medicamentosa    • Shoulder pain    • Skin tag- removal    • Subclinical hypothyroidism          Review of Systems   HENT: Positive for congestion and rhinorrhea.    Neurological: Positive for headaches.           Objective   Physical Exam  General: Well-developed well-nourished female in no acute distress.  Alert and oriented ×3. Head: Normocephalic. Face: Symmetrical strength and appearance. PERRL. EOMI. Voice:Strong. Speech:Fluent  Ears: External ears no deformity, canals no discharge, tympanic membranes intact clear and mobile bilaterally.  Nose: Nares show swollen mucosa with visible mucoid discharge in the left naris.  No gross external deformity.  Septum: To the right superiorly with a spur to the left inferiorly  Oral cavity: Lips and gums without lesions.  Tongue and floor of mouth without lesions.  Parotid and submandibular ducts unobstructed.  No mucosal lesions on the buccal mucosa or vestibule of the mouth.  Pharynx: No erythema exudate mass or ulcer  Neck: No lymphadenopathy.  No thyromegaly.  Trachea and larynx midline.  No masses in the parotid or submandibular glands.  Chest: Clear.  Heart: Regular.  Abdomen: Benign.      Assessment/Plan   [place] was seen today for results.    Diagnoses and all orders for this visit:    Chronic pansinusitis    Nasal septal deformity      Plan: Presented the patient the option of proceeding with bilateral endoscopic complete ethmoidectomy, maxillary sinus antrostomy, frontal recess exploration, with  septoplasty if septal deformity significantly impeded surgical access to the sinuses.  I explained the nature of this procedure to the patient in layman's terms including need for general anesthetic and risks including bleeding, damage to the eyes, which could result in vision change or vision loss, damage to the base of brain which could represent result in leakage of spinal fluid and possible need for further surgery, failure to improve symptoms, and need for postoperative debridements which may be quite painful.  I have explained that if the septoplasty is performed there is also the risk of hole in the nasal septum numbness of the anterior palate unwanted change in nasal appearance.  Proposed benefit would be drainage of acute infection which would hopefully lead to resolution of acute infection and decreased frequency and severity of chronic sinus symptoms.  The alternative would be continued medical therapy which was also discussed and offered.  Patient voices understanding of all of the above and wishes to proceed with surgery, but states she needs to wait until at least mid April, therefore an additional 2 weeks of Augmentin will be sent to her pharmacy.  We'll also send a prescription for prednisone to begin 60 mg a day 3 days prior to surgery tapering until 3 days after surgery.  In the meantime she is also to continue to use nasal saline spray frequently.

## 2018-04-16 NOTE — OP NOTE
PREOPERATIVE DIAGNOSIS:  Chronic pansinusitis.    POSTOPERATIVE DIAGNOSIS:  Chronic pansinusitis.    PROCEDURES PERFORMED:    1.  Bilateral endoscopic complete ethmoidectomy.  2.  Maxillary sinus antrostomy.  3.  Frontal recess exploration.    SURGEON: Daniel Griffith MD    ANESTHESIA: General endotracheal.    ESTIMATED BLOOD LOSS:  200 mL.    FLUIDS: Crystalloids.    SPECIMENS:   1.  Right ethmoid.  2.  Left ethmoid.  3.  Right maxillary sinus aspirate for culture.    DRAINS: None.    COMPLICATIONS: None.    INDICATIONS FOR PROCEDURE: A 72-year-old female with chronic sinusitis involving the maxillary, ethmoid, and frontal sinuses bilaterally, including recurring acute sinusitis which would only clear briefly with antibiotic therapy.    FINDINGS: Polypoid degeneration of the mucosa of the anterior and posterior ethmoids bilaterally. Mucoid secretions in the left maxillary sinus. Purulent secretions in the left frontal and right maxillary sinus.    DESCRIPTION OF PROCEDURE:  Patient was taken to the operating room and placed in supine position. After the satisfactory induction of general endotracheal anesthesia, the head of the bed was turned, and a reference star was attached to the patient's forehead. Using the [employer] Image-Guided System, patient was registered and accuracy was assured. Afrin-soaked pledgets were then placed in the nares bilaterally, and the face was prepped and draped in the usual fashion. The microdebrider, angled ball-tip probe, and curved suction were fitted with reference stars, registered, and accuracy was confirmed. The Afrin-soaked were removed from the nares and the 0-degree scope was passed into the left nostril. The middle turbinate was identified and subluxed medially with a [place] elevator. The uncinate process was infiltrated with 1% Xylocaine with epinephrine and then incised from superior to inferior along its anterior attachment. Base of the uncinate was amputated with scissors and  the uncinate was resected with straight-biting forceps. The anterior ethmoid cells were resected using the microdebrider back to the horizontal attachment of the middle turbinate, which was nassar bluntly, and the posterior ethmoid cells were also resected using the microdebrider along with pediatric upbiting forceps. A maxillary sinus antrostomy was created by cannulating the natural sinus ostium with a ball-tipped probe. This was enlarged posteriorly and superiorly, first bluntly and then further enlarged with the microdebrider and backbiting forceps. Mucoid secretions were evacuated from the left maxillary sinus. There was granular, zimjqwrotld-[id number]-pxunjwumd mucosa present. The frontal recess was then opened by removing obstructing ethmoid cells along the roof of the ethmoid from posterior to anterior using an angled ball-tip probe and pediatric upbiting forceps. Purulent material was noted to drain from the frontal sinus. Once the frontal recess was fully opened, a curved suction was able to be passed into the frontal sinus, and additional purulent material was evacuated with suction. Afrin-soaked pledgets were placed in the middle meatal cleft and attention was turned to the right side. A 0-degree scope was passed into the nose. Middle turbinate was identified and subluxed medially with a [place] elevator. The uncinate process was infiltrated with 1% Xylocaine with epinephrine and incised from superior to inferior along its anterior attachment. The anterior ethmoid cells were resected using the microdebrider back to the horizontal attachment of the middle turbinate which was bluntly pierced and then the posterior ethmoid cells were resected using the microdebrider and pediatric upbiting forceps. A maxillary sinus antrostomy was created by cannulating the natural sinus ostium with a ball-tip probe. This was enlarged posteriorly and inferiorly bluntly and there was noted to be jason purulent material in the  maxillary sinus. Therefore, a curved suction which had not been previously used was attached to a suction trap and aspirate of the right maxillary sinus material was obtained and sent for culture. The maxillary sinus antrostomy was further enlarged using the microdebrider and backbiting forceps. The frontal recess was opened by removing obstructing ethmoid partitions along the roof of the ethmoid from posterior to anterior using a combination of the angled ball-tip probe and pediatric upbiting forceps. A curved suction was able to be passed into the frontal sinus once this was completed.  At this point, Afrin-soaked pledgets were placed in the middle meatus on the right and attention was turned back to the left where the Afrin-soaked pledgets were removed. There was noted to be no active bleeding but the middle turbinate was felt to be somewhat unstable and, therefore, a rolled piece of Gelfoam was inserted in the middle meatus between the middle turbinate and the lateral nasal wall. The Afrin-soaked pledgets were removed from the right side. The middle turbinate appeared stable on this side and there was not active and, therefore, the procedure was terminated.  Mupirocin-coated Telfa pads were placed in the nares bilaterally after termination of the procedure, and the patient was taken to the recovery room in satisfactory condition.

## 2018-04-19 ENCOUNTER — OFFICE VISIT (OUTPATIENT)
Dept: OTOLARYNGOLOGY | Facility: CLINIC | Age: 73
End: 2018-04-19

## 2018-04-19 VITALS — OXYGEN SATURATION: 95 % | WEIGHT: 227 LBS | HEIGHT: 67 IN | HEART RATE: 77 BPM | BODY MASS INDEX: 35.63 KG/M2

## 2018-04-19 DIAGNOSIS — Z48.813 AFTERCARE FOLLOWING SURGERY OF THE RESPIRATORY SYSTEM: Primary | ICD-10-CM

## 2018-04-19 LAB
BACTERIA FLD CULT: ABNORMAL
BACTERIA FLD CULT: ABNORMAL
BACTERIA SPEC ANAEROBE CULT: NORMAL
GRAM STN SPEC: ABNORMAL
GRAM STN SPEC: ABNORMAL

## 2018-04-19 PROCEDURE — 99212 OFFICE O/P EST SF 10 MIN: CPT | Performed by: OTOLARYNGOLOGY

## 2018-04-19 NOTE — PROGRESS NOTES
Subjective   Pita Hyatt is a 72 y.o. female.       History of Present Illness   Patient is 3 days postop endoscopic sinus surgery.  Purulent secretions were noted in the left frontal and right maxillary sinus.  Cultures thus far are not showing any growth other than scant growth of coag-negative staph which is likely a contaminant.  Says she hasn't been hurting badly enough that she needed her pain medicine.  Has had a little bit of a headache.  No active bleeding.      The following portions of the patient's history were reviewed and updated as appropriate: allergies, current medications, past family history, past medical history, past social history, past surgical history and problem list.     reports that she quit smoking about 38 years ago. She has never used smokeless tobacco. She reports that she drinks alcohol. She reports that she does not use drugs.   Patient is not a tobacco user and has not been counseled for use of tobacco products      Review of Systems        Objective   Physical Exam  Zay-Synephrine and Xylocaine are instilled the nares.  0° scope is passed in each nostril.  The Gelfilm roll was removed from the left middle meatal cleft.  Clots and Kresser debrided from the middle ear cleft maxillary sinus antrostomies and frontal recesses bilaterally.      Assessment/Plan   [place] was seen today for post-op.    Diagnoses and all orders for this visit:    Aftercare following surgery of the respiratory system      Plan: Debridement as described above.  Continue saline spray frequently.  Return Monday 4/23 for next debridement call sooner for problems.

## 2018-04-23 ENCOUNTER — OFFICE VISIT (OUTPATIENT)
Dept: OTOLARYNGOLOGY | Facility: CLINIC | Age: 73
End: 2018-04-23

## 2018-04-23 VITALS — BODY MASS INDEX: 35.63 KG/M2 | WEIGHT: 227 LBS | HEIGHT: 67 IN

## 2018-04-23 DIAGNOSIS — Z48.813 AFTERCARE FOLLOWING SURGERY OF THE RESPIRATORY SYSTEM: Primary | ICD-10-CM

## 2018-04-23 PROCEDURE — 99212 OFFICE O/P EST SF 10 MIN: CPT | Performed by: OTOLARYNGOLOGY

## 2018-04-23 NOTE — PROGRESS NOTES
Subjective   Pita Hyatt is a 72 y.o. female.       History of Present Illness   Patient presents to for her second postop debridement 1 week status post endoscopic sinus surgery bilaterally.  Cultures obtained at the time of surgery have shown no growth with the exception of some light coag negative staph which was probably a contaminant.  Patient is doing well.  Has not needed her pain medicine.  Is not having any significant bleeding.      The following portions of the patient's history were reviewed and updated as appropriate: allergies, current medications, past family history, past medical history, past social history, past surgical history and problem list.     reports that she quit smoking about 38 years ago. She has never used smokeless tobacco. She reports that she drinks alcohol. She reports that she does not use drugs.   Patient is not a tobacco user and has not been counseled for use of tobacco products      Review of Systems        Objective   Physical Exam  Zay-Synephrine and Xylocaine are instilled the nares and 0° scope is passed into each nostril.  Clots and crust or debrided from middle middle cleft bilaterally.  The maxillary sinus antrostomies are widely patent.  Plan: Debridement as      Assessment/Plan   [place] was seen today for follow-up.    Diagnoses and all orders for this visit:    Aftercare following surgery of the respiratory system        Plan: Debridement as described above.  Continue nasal saline spray frequently.  Return 5/1/18 at the [place] office for final debridement.

## 2018-04-26 RX ORDER — ERGOCALCIFEROL 1.25 MG/1
50000 CAPSULE ORAL WEEKLY
Qty: 4 CAPSULE | Refills: 2 | Status: SHIPPED | OUTPATIENT
Start: 2018-04-26 | End: 2018-08-09 | Stop reason: SDUPTHER

## 2018-04-26 RX ORDER — CETIRIZINE HYDROCHLORIDE, PSEUDOEPHEDRINE HYDROCHLORIDE 5; 120 MG/1; MG/1
1 TABLET, FILM COATED, EXTENDED RELEASE ORAL 2 TIMES DAILY
Qty: 60 TABLET | Refills: 1 | Status: SHIPPED | OUTPATIENT
Start: 2018-04-26 | End: 2018-07-05 | Stop reason: SDUPTHER

## 2018-05-01 ENCOUNTER — OFFICE VISIT (OUTPATIENT)
Dept: OTOLARYNGOLOGY | Facility: CLINIC | Age: 73
End: 2018-05-01

## 2018-05-01 VITALS — HEART RATE: 77 BPM | HEIGHT: 67 IN | WEIGHT: 228 LBS | OXYGEN SATURATION: 98 % | BODY MASS INDEX: 35.79 KG/M2

## 2018-05-01 DIAGNOSIS — Z48.813 AFTERCARE FOLLOWING SURGERY OF THE RESPIRATORY SYSTEM: Primary | ICD-10-CM

## 2018-05-01 DIAGNOSIS — J01.21 ACUTE RECURRENT ETHMOIDAL SINUSITIS: ICD-10-CM

## 2018-05-01 PROCEDURE — 31231 NASAL ENDOSCOPY DX: CPT | Performed by: OTOLARYNGOLOGY

## 2018-05-01 PROCEDURE — 99212 OFFICE O/P EST SF 10 MIN: CPT | Performed by: OTOLARYNGOLOGY

## 2018-05-01 RX ORDER — AMOXICILLIN AND CLAVULANATE POTASSIUM 875; 125 MG/1; MG/1
1 TABLET, FILM COATED ORAL EVERY 12 HOURS
Qty: 20 TABLET | Refills: 0 | Status: SHIPPED | OUTPATIENT
Start: 2018-05-01 | End: 2018-05-25

## 2018-05-01 RX ORDER — LEVOFLOXACIN 500 MG/1
500 TABLET, FILM COATED ORAL DAILY
Qty: 10 TABLET | Refills: 0 | Status: CANCELLED | OUTPATIENT
Start: 2018-05-01

## 2018-05-01 NOTE — PROGRESS NOTES
Subjective   Pita Hyatt is a 73 y.o. female.       History of Present Illness   Patient is status post endoscopic sinus surgery.  Generally has been doing well but felt like she had something in her nose and blew her nose last week revealing some bloody discharge.  Is having some pressure/pain on the left side.  States she is using her saline spray frequently.  Has completed her course of postop Ceftin.      The following portions of the patient's history were reviewed and updated as appropriate: allergies, current medications, past family history, past medical history, past social history, past surgical history and problem list.     reports that she quit smoking about 38 years ago. She has never used smokeless tobacco. She reports that she drinks alcohol. She reports that she does not use drugs.   Patient is not a tobacco user and has not been counseled for use of tobacco products      Review of Systems        Objective   Physical Exam  Zay-Synephrine and Xylocaine are instilled the nares.  0° scope is passed in each nostril.  On the right side clots and crust or debrided from the middle meatal cleft on the maxillary sinus antrostomy.  The antrostomy is patent.  On the left side there mucopurulent secretions in the middle meatus in the area of the anterior ethmoid.  This is evacuated with suction.  The maxillary sinus antrostomy appears patent and there does not appear to be purulent material in the maxillary sinus.  The frontal recess is suctioned and there does not appear to be purulence draining from the frontal recess.      Assessment/Plan   [place] was seen today for post-op.    Diagnoses and all orders for this visit:    Aftercare following surgery of the respiratory system    Acute recurrent ethmoidal sinusitis      Plan: Augmentin 875 mg twice a day ×10 days.  Continue nasal saline spray frequently.  Return in 2 weeks.  Call for problems.

## 2018-05-02 LAB
LAB AP CASE REPORT: NORMAL
LAB AP SPECIAL STAINS: NORMAL
Lab: NORMAL
PATH REPORT.FINAL DX SPEC: NORMAL
PATH REPORT.GROSS SPEC: NORMAL

## 2018-05-21 DIAGNOSIS — R53.81 MALAISE AND FATIGUE: ICD-10-CM

## 2018-05-21 DIAGNOSIS — E55.9 VITAMIN D DEFICIENCY: ICD-10-CM

## 2018-05-21 DIAGNOSIS — R53.83 MALAISE AND FATIGUE: ICD-10-CM

## 2018-05-21 RX ORDER — LEVOTHYROXINE SODIUM 0.1 MG/1
TABLET ORAL
Qty: 30 TABLET | Refills: 0 | Status: SHIPPED | OUTPATIENT
Start: 2018-05-21 | End: 2018-06-21 | Stop reason: SDUPTHER

## 2018-05-21 RX ORDER — CITALOPRAM 40 MG/1
TABLET ORAL
Qty: 30 TABLET | Refills: 0 | Status: SHIPPED | OUTPATIENT
Start: 2018-05-21 | End: 2018-05-25 | Stop reason: DRUGHIGH

## 2018-05-21 RX ORDER — MELOXICAM 15 MG/1
TABLET ORAL
Qty: 30 TABLET | Refills: 0 | Status: SHIPPED | OUTPATIENT
Start: 2018-05-21 | End: 2018-06-21 | Stop reason: SDUPTHER

## 2018-05-25 ENCOUNTER — LAB (OUTPATIENT)
Dept: LAB | Facility: CLINIC | Age: 73
End: 2018-05-25

## 2018-05-25 ENCOUNTER — OFFICE VISIT (OUTPATIENT)
Dept: OTOLARYNGOLOGY | Facility: CLINIC | Age: 73
End: 2018-05-25

## 2018-05-25 VITALS — WEIGHT: 229 LBS | HEART RATE: 85 BPM | HEIGHT: 67 IN | BODY MASS INDEX: 35.94 KG/M2 | OXYGEN SATURATION: 98 %

## 2018-05-25 DIAGNOSIS — J01.21 ACUTE RECURRENT ETHMOIDAL SINUSITIS: Primary | ICD-10-CM

## 2018-05-25 DIAGNOSIS — Z48.813 AFTERCARE FOLLOWING SURGERY OF THE RESPIRATORY SYSTEM: ICD-10-CM

## 2018-05-25 DIAGNOSIS — E55.9 VITAMIN D DEFICIENCY: ICD-10-CM

## 2018-05-25 DIAGNOSIS — R53.81 MALAISE AND FATIGUE: ICD-10-CM

## 2018-05-25 DIAGNOSIS — R53.83 MALAISE AND FATIGUE: ICD-10-CM

## 2018-05-25 LAB
25(OH)D3 SERPL-MCNC: 47.6 NG/ML (ref 30–100)
ALBUMIN SERPL-MCNC: 3.1 G/DL (ref 3.4–4.8)
ALP SERPL-CCNC: 132 U/L (ref 38–126)
ALT SERPL W P-5'-P-CCNC: 49 U/L (ref 9–52)
AST SERPL-CCNC: 66 U/L (ref 14–36)
BILIRUB CONJ SERPL-MCNC: 0 MG/DL (ref 0–0.3)
BILIRUB INDIRECT SERPL-MCNC: 0.2 MG/DL (ref 0–1.1)
BILIRUB SERPL-MCNC: 0.7 MG/DL (ref 0.2–1.3)
INR PPP: 1.19 (ref 0.8–1.2)
PROT SERPL-MCNC: 6.1 G/DL (ref 6.3–8.6)
PROTHROMBIN TIME: 14.8 SECONDS (ref 11.1–15.3)
VIT B12 BLD-MCNC: 677 PG/ML (ref 239–931)

## 2018-05-25 PROCEDURE — 85610 PROTHROMBIN TIME: CPT | Performed by: PHYSICIAN ASSISTANT

## 2018-05-25 PROCEDURE — 80076 HEPATIC FUNCTION PANEL: CPT | Performed by: PHYSICIAN ASSISTANT

## 2018-05-25 PROCEDURE — 82607 VITAMIN B-12: CPT | Performed by: NURSE PRACTITIONER

## 2018-05-25 PROCEDURE — 31231 NASAL ENDOSCOPY DX: CPT | Performed by: OTOLARYNGOLOGY

## 2018-05-25 PROCEDURE — 36415 COLL VENOUS BLD VENIPUNCTURE: CPT | Performed by: FAMILY MEDICINE

## 2018-05-25 PROCEDURE — 82306 VITAMIN D 25 HYDROXY: CPT | Performed by: NURSE PRACTITIONER

## 2018-05-25 RX ORDER — CEFUROXIME AXETIL 500 MG/1
500 TABLET ORAL 2 TIMES DAILY
Qty: 28 TABLET | Refills: 0 | Status: SHIPPED | OUTPATIENT
Start: 2018-05-25 | End: 2018-07-17

## 2018-05-25 RX ORDER — CITALOPRAM 20 MG/1
20 TABLET ORAL DAILY
COMMUNITY
End: 2018-06-21 | Stop reason: SDUPTHER

## 2018-05-25 NOTE — PROGRESS NOTES
Subjective   Pita Hyatt is a 73 y.o. female.       History of Present Illness   Patient is status post endoscopic sinus surgery.  At last visit, approximately 4 weeks ago, she was noted to have purulent discharge in the left middle meatus consistent with recurrent ethmoid sinusitis.  Was treated with Augmentin.  States she is no longer having bloody discharge from the nose.  States when she bends over she has some drainage from the nose that is brownish.  It is not clear.  Not associated with any significant headache or mental status changes.      The following portions of the patient's history were reviewed and updated as appropriate: allergies, current medications, past family history, past medical history, past social history, past surgical history and problem list.     reports that she quit smoking about 38 years ago. She has never used smokeless tobacco. She reports that she drinks alcohol. She reports that she does not use drugs.   Patient is not a tobacco user and has not been counseled for use of tobacco products      Review of Systems   Constitutional: Negative for fever.           Objective   Physical Exam  Zay-Synephrine and Xylocaine are instilled the nares.  0° scope is passed into each nostril.  The inferior, middle, and superior turbinates as well as nasal septum are examined.  Pertinent findings include no evidence of residual purulence in the left middle meatus.  Maxillary sinus antrostomy is patent.  However on the right side there is now mucopurulent secretions in the ethmoid area.  Maxillary sinus antrostomy appears to be patent.      Assessment/Plan   [place] was seen today for follow-up.    Diagnoses and all orders for this visit:    Acute recurrent ethmoidal sinusitis    Aftercare following surgery of the respiratory system    Other orders  -     cefuroxime (CEFTIN) 500 MG tablet; Take 1 tablet by mouth 2 (Two) Times a Day.      Plan: Additional antibiotic therapy with Ceftin 500 mg  twice a day ×14 days.  Use nasal saline spray frequently.  Return in 2 weeks.

## 2018-06-08 ENCOUNTER — OFFICE VISIT (OUTPATIENT)
Dept: OTOLARYNGOLOGY | Facility: CLINIC | Age: 73
End: 2018-06-08

## 2018-06-08 ENCOUNTER — OFFICE VISIT (OUTPATIENT)
Dept: FAMILY MEDICINE CLINIC | Facility: CLINIC | Age: 73
End: 2018-06-08

## 2018-06-08 VITALS
DIASTOLIC BLOOD PRESSURE: 91 MMHG | HEIGHT: 67 IN | TEMPERATURE: 97.6 F | HEART RATE: 87 BPM | RESPIRATION RATE: 20 BRPM | BODY MASS INDEX: 35.47 KG/M2 | OXYGEN SATURATION: 96 % | SYSTOLIC BLOOD PRESSURE: 132 MMHG | WEIGHT: 226 LBS

## 2018-06-08 VITALS — HEIGHT: 67 IN | BODY MASS INDEX: 35.47 KG/M2 | WEIGHT: 226 LBS | OXYGEN SATURATION: 98 %

## 2018-06-08 DIAGNOSIS — R53.83 FATIGUE, UNSPECIFIED TYPE: ICD-10-CM

## 2018-06-08 DIAGNOSIS — Z48.813 AFTERCARE FOLLOWING SURGERY OF THE RESPIRATORY SYSTEM: ICD-10-CM

## 2018-06-08 DIAGNOSIS — Z13.0 SCREENING FOR DEFICIENCY ANEMIA: ICD-10-CM

## 2018-06-08 DIAGNOSIS — Z11.59 ENCOUNTER FOR HEPATITIS C SCREENING TEST FOR LOW RISK PATIENT: ICD-10-CM

## 2018-06-08 DIAGNOSIS — E03.9 HYPOTHYROIDISM, UNSPECIFIED TYPE: Primary | ICD-10-CM

## 2018-06-08 DIAGNOSIS — J01.21 ACUTE RECURRENT ETHMOIDAL SINUSITIS: Primary | ICD-10-CM

## 2018-06-08 LAB
BASOPHILS # BLD AUTO: 0.02 10*3/MM3 (ref 0–0.2)
BASOPHILS NFR BLD AUTO: 0.5 % (ref 0–2)
DEPRECATED RDW RBC AUTO: 47.3 FL (ref 36.4–46.3)
EOSINOPHIL # BLD AUTO: 0.18 10*3/MM3 (ref 0–0.7)
EOSINOPHIL NFR BLD AUTO: 4.1 % (ref 0–7)
ERYTHROCYTE [DISTWIDTH] IN BLOOD BY AUTOMATED COUNT: 14 % (ref 11.5–14.5)
HCT VFR BLD AUTO: 39.9 % (ref 35–45)
HCV AB SER DONR QL: NEGATIVE
HGB BLD-MCNC: 12.7 G/DL (ref 12–15.5)
IMM GRANULOCYTES # BLD: 0 10*3/MM3 (ref 0–0.02)
IMM GRANULOCYTES NFR BLD: 0 % (ref 0–0.5)
LYMPHOCYTES # BLD AUTO: 1.51 10*3/MM3 (ref 0.6–4.2)
LYMPHOCYTES NFR BLD AUTO: 34.7 % (ref 10–50)
MCH RBC QN AUTO: 29.2 PG (ref 26.5–34)
MCHC RBC AUTO-ENTMCNC: 31.8 G/DL (ref 31.4–36)
MCV RBC AUTO: 91.7 FL (ref 80–98)
MONOCYTES # BLD AUTO: 0.45 10*3/MM3 (ref 0–0.9)
MONOCYTES NFR BLD AUTO: 10.3 % (ref 0–12)
NEUTROPHILS # BLD AUTO: 2.19 10*3/MM3 (ref 2–8.6)
NEUTROPHILS NFR BLD AUTO: 50.4 % (ref 37–80)
PLATELET # BLD AUTO: 202 10*3/MM3 (ref 150–450)
PMV BLD AUTO: 10.2 FL (ref 8–12)
RBC # BLD AUTO: 4.35 10*6/MM3 (ref 3.77–5.16)
TSH SERPL DL<=0.05 MIU/L-ACNC: 1.64 MIU/ML (ref 0.46–4.68)
WBC NRBC COR # BLD: 4.35 10*3/MM3 (ref 3.2–9.8)

## 2018-06-08 PROCEDURE — 99214 OFFICE O/P EST MOD 30 MIN: CPT | Performed by: NURSE PRACTITIONER

## 2018-06-08 PROCEDURE — 84443 ASSAY THYROID STIM HORMONE: CPT | Performed by: NURSE PRACTITIONER

## 2018-06-08 PROCEDURE — 85025 COMPLETE CBC W/AUTO DIFF WBC: CPT | Performed by: NURSE PRACTITIONER

## 2018-06-08 PROCEDURE — 36415 COLL VENOUS BLD VENIPUNCTURE: CPT | Performed by: NURSE PRACTITIONER

## 2018-06-08 PROCEDURE — 86803 HEPATITIS C AB TEST: CPT | Performed by: NURSE PRACTITIONER

## 2018-06-08 PROCEDURE — 99213 OFFICE O/P EST LOW 20 MIN: CPT | Performed by: OTOLARYNGOLOGY

## 2018-06-08 RX ORDER — LEVOFLOXACIN 500 MG/1
500 TABLET, FILM COATED ORAL DAILY
Qty: 14 TABLET | Refills: 0 | Status: SHIPPED | OUTPATIENT
Start: 2018-06-08 | End: 2018-07-22

## 2018-06-08 RX ORDER — PREDNISONE 20 MG/1
TABLET ORAL
Qty: 12 TABLET | Refills: 0 | Status: SHIPPED | OUTPATIENT
Start: 2018-06-08 | End: 2018-07-22

## 2018-06-08 NOTE — PROGRESS NOTES
Subjective   Pita Hyatt is a 73 y.o. female.     Here today with c/o fatigue.  Is worse than usual.  She cont to have problems with chronic sinusitis.  Also has hypothyroidism and has not had tsh checked in a while.      Hypothyroidism   This is a chronic problem. The current episode started more than 1 year ago. The problem occurs constantly. The problem has been unchanged. Associated symptoms include fatigue. Pertinent negatives include no abdominal pain, anorexia, arthralgias, change in bowel habit, chest pain, chills, congestion, coughing, diaphoresis, fever, headaches, joint swelling, myalgias, nausea, neck pain, numbness, rash, sore throat, swollen glands, urinary symptoms, vertigo, visual change, vomiting or weakness. Nothing aggravates the symptoms. Treatments tried: levothyroxine. Improvement on treatment: prev controlled.   Fatigue   This is a new problem. The current episode started more than 1 month ago. The problem occurs constantly. The problem has been gradually worsening. Associated symptoms include fatigue. Pertinent negatives include no abdominal pain, anorexia, arthralgias, change in bowel habit, chest pain, chills, congestion, coughing, diaphoresis, fever, headaches, joint swelling, myalgias, nausea, neck pain, numbness, rash, sore throat, swollen glands, urinary symptoms, vertigo, visual change, vomiting or weakness. Nothing aggravates the symptoms. She has tried nothing for the symptoms. The treatment provided no relief.        The following portions of the patient's history were reviewed and updated as appropriate: allergies, current medications, past family history, past medical history, past social history, past surgical history and problem list.    Review of Systems   Constitutional: Positive for fatigue. Negative for chills, diaphoresis and fever.   HENT: Negative.  Negative for congestion and sore throat.    Respiratory: Negative.  Negative for cough.    Cardiovascular: Negative.   Negative for chest pain.   Gastrointestinal: Negative for abdominal pain, anorexia, change in bowel habit, nausea and vomiting.   Musculoskeletal: Negative.  Negative for arthralgias, joint swelling, myalgias and neck pain.   Skin: Negative.  Negative for rash.   Neurological: Negative.  Negative for vertigo, weakness and numbness.   Psychiatric/Behavioral: Negative.        Objective   Physical Exam   Constitutional: She is oriented to person, place, and time. She appears well-developed and well-nourished. No distress.   HENT:   Head: Normocephalic.   Eyes: EOM are normal. Pupils are equal, round, and reactive to light.   Neck: Normal range of motion. Neck supple. No thyromegaly present.   Cardiovascular: Normal rate, regular rhythm and normal heart sounds.    No murmur heard.  Pulmonary/Chest: Effort normal and breath sounds normal. No respiratory distress. She has no wheezes. She has no rales.   Abdominal: Soft. Bowel sounds are normal. She exhibits no distension. There is no tenderness. There is no guarding.   Musculoskeletal: Normal range of motion.   Neurological: She is alert and oriented to person, place, and time.   Skin: Skin is warm and dry.   Psychiatric: She has a normal mood and affect. Thought content normal.   Nursing note and vitals reviewed.        Assessment/Plan   [place] was seen today for hypothyroidism, heartburn and allergies.    Diagnoses and all orders for this visit:    Hypothyroidism, unspecified type  -     TSH    Fatigue, unspecified type    Screening for deficiency anemia  -     CBC & Differential  -     CBC Auto Differential    Encounter for hepatitis C screening test for low risk patient  -     Hepatitis C antibody    will drawn above labs to further evaluate the fatigue.

## 2018-06-08 NOTE — PROGRESS NOTES
"Subjective   Georgia Dionna Hyatt is a 73 y.o. female.       History of Present Illness   Patient is status post endoscopic sinus surgery.  Initially had a left-sided maxillary sinusitis that responded to medical therapy.  At last visit had a right-sided maxillary sinusitis.  Was given Ceftin.  Reports that the Ceftin has not helped and that her nose is \"miserable\".  Says she feels like she can't breathe and she's having a lot of pressure in the right side of her face.      The following portions of the patient's history were reviewed and updated as appropriate: allergies, current medications, past family history, past medical history, past social history, past surgical history and problem list.     reports that she quit smoking about 38 years ago. She has never used smokeless tobacco. She reports that she drinks alcohol. She reports that she does not use drugs.   Patient is not a tobacco user and has not been counseled for use of tobacco products      Review of Systems   Constitutional: Negative for fever.           Objective   Physical Exam  Face: No periorbital erythema.  Extraocular mobility appears intact  Nose: Boggy mucosa.  No external deformity.  Septum is midline.  DOS to be is performed: Zay-Synephrine and Xylocaine are instilled the nares.  0° scope is passed into each nostril.  Left middle meatus shows postsurgical changes but no evidence of discharge or purulence.  Maxillary sinus antrostomy is patent.  The right middle meatal clefts still shows purulent secretions and marked edema and erythema.  Zay-Synephrine and Xylocaine on cotton were then placed directly into the middle meatus under direct visualization allowed to remain for proximal he 5 minutes upon removal I was able to pass a curved suction into the maxillary sinus and there did not appear to be a lot of purulent secretions in the maxillary sinus there is edematous mucosa and purulent secretions in the ethmoid and frontal ethmoid recess that is " evacuated with suction        Assessment/Plan   Georgia was seen today for follow-up.    Diagnoses and all orders for this visit:    Acute recurrent ethmoidal sinusitis    Aftercare following surgery of the respiratory system    Other orders  -     levoFLOXacin (LEVAQUIN) 500 MG tablet; Take 1 tablet by mouth Daily.  -     predniSONE (DELTASONE) 20 MG tablet; 1 pill by mouth 3 TID for 2 days, then 1 pill by mouth BID for 2 days, then 1 pill by mouth QD for 2 days.      Plan: Patient has failed to show any improvement with Augmentin or Ceftin.  I think utilizing Levaquin 500 mg daily for 2 weeks is indicated.  I did explain that there is a risk of cardiac arrhythmia associated with Levaquin and citalopram use.  Patient states she is tapering her citalopram and hopes to be able to discontinue it as of today pending a discussion with her primary care provider.  She understands the risks and agrees with me that the risk aided by the benefit of using the Levaquin at this point.  We'll also prescribe a short course of prednisone to help with the mucosal edema that is present.  She will return in 2 weeks and call sooner for problems.

## 2018-06-25 RX ORDER — LEVOTHYROXINE SODIUM 0.1 MG/1
100 TABLET ORAL DAILY
Qty: 30 TABLET | Refills: 3 | Status: SHIPPED | OUTPATIENT
Start: 2018-06-25 | End: 2018-10-11 | Stop reason: SDUPTHER

## 2018-06-25 RX ORDER — CITALOPRAM 20 MG/1
20 TABLET ORAL DAILY
Qty: 30 TABLET | Refills: 3 | Status: SHIPPED | OUTPATIENT
Start: 2018-06-25 | End: 2018-07-22

## 2018-06-25 RX ORDER — MELOXICAM 15 MG/1
15 TABLET ORAL DAILY
Qty: 30 TABLET | Refills: 3 | Status: SHIPPED | OUTPATIENT
Start: 2018-06-25 | End: 2018-09-13

## 2018-07-09 RX ORDER — CETIRIZINE HYDROCHLORIDE, PSEUDOEPHEDRINE HYDROCHLORIDE 5; 120 MG/1; MG/1
1 TABLET, FILM COATED, EXTENDED RELEASE ORAL 2 TIMES DAILY
Qty: 60 TABLET | Refills: 3 | Status: SHIPPED | OUTPATIENT
Start: 2018-07-09 | End: 2018-10-11 | Stop reason: SDUPTHER

## 2018-07-17 ENCOUNTER — OFFICE VISIT (OUTPATIENT)
Dept: OTOLARYNGOLOGY | Facility: CLINIC | Age: 73
End: 2018-07-17

## 2018-07-17 VITALS — OXYGEN SATURATION: 98 % | BODY MASS INDEX: 35.79 KG/M2 | HEIGHT: 67 IN | WEIGHT: 228 LBS

## 2018-07-17 DIAGNOSIS — J32.0 CHRONIC MAXILLARY SINUSITIS: Primary | ICD-10-CM

## 2018-07-17 PROCEDURE — 31231 NASAL ENDOSCOPY DX: CPT | Performed by: OTOLARYNGOLOGY

## 2018-07-17 NOTE — PROGRESS NOTES
Subjective   Pita Hyatt is a 73 y.o. female.       History of Present Illness   Patient is status post endoscopic sinus surgery in April.  Initially had a residual/recurrent acute left-sided maxillary sinus infection.  Subsequently developed a right-sided infection.  Was seen on 6/8/18 and again had a right-sided maxillary sinusitis with purulent secretions.  She was given Levaquin and prednisone.  She states she improved for a short period of time but her symptoms have recurred and she is congested.  She had some leftover Ceftin and took this and just finished yesterday.      The following portions of the patient's history were reviewed and updated as appropriate: allergies, current medications, past family history, past medical history, past social history, past surgical history and problem list.     reports that she quit smoking about 38 years ago. She has never used smokeless tobacco. She reports that she drinks alcohol. She reports that she does not use drugs.   Patient is not a tobacco user and has not been counseled for use of tobacco products      Review of Systems        Objective   Physical Exam  Nasal endoscopy is performed: Zay-Synephrine and Xylocaine are instilled the nares bilaterally.  0° scope is passed into each nostril.  The inferior, middle, and superior turbinates as well as nasal septum and nasopharynx are examined.  Pertinent findings include: Mucopurulent secretions in the right middle meatal cleft suggesting persistent right maxillary sinusitis.  On the left side there is boggy mucosa.  The maxillary antrostomy is patent.  There are a few strands of mucoid secretions.      Assessment/Plan   Georgia was seen today for follow-up.    Diagnoses and all orders for this visit:    Chronic maxillary sinusitis        Plan: She is had multiple rounds of broad-spectrum antibiotics.  At this point I think a culture is indicated.  Unfortunately this cannot be done at the [facility] office so she  will come to [facility] tomorrow for a right maxillary sinus culture.  Once an organism is identified with sensitivities will utilize culture directed antibiotics in hopes of resolution.

## 2018-07-18 ENCOUNTER — OFFICE VISIT (OUTPATIENT)
Dept: OTOLARYNGOLOGY | Facility: CLINIC | Age: 73
End: 2018-07-18

## 2018-07-18 VITALS — WEIGHT: 228 LBS | BODY MASS INDEX: 35.79 KG/M2 | HEIGHT: 67 IN | OXYGEN SATURATION: 96 %

## 2018-07-18 DIAGNOSIS — J32.0 CHRONIC MAXILLARY SINUSITIS: ICD-10-CM

## 2018-07-18 DIAGNOSIS — J32.0 CHRONIC MAXILLARY SINUSITIS: Primary | ICD-10-CM

## 2018-07-18 LAB — KOH PREP NAIL: NORMAL

## 2018-07-18 PROCEDURE — 87070 CULTURE OTHR SPECIMN AEROBIC: CPT | Performed by: OTOLARYNGOLOGY

## 2018-07-18 PROCEDURE — 87102 FUNGUS ISOLATION CULTURE: CPT | Performed by: OTOLARYNGOLOGY

## 2018-07-18 PROCEDURE — 99212 OFFICE O/P EST SF 10 MIN: CPT | Performed by: OTOLARYNGOLOGY

## 2018-07-18 PROCEDURE — 87220 TISSUE EXAM FOR FUNGI: CPT | Performed by: OTOLARYNGOLOGY

## 2018-07-18 PROCEDURE — 87205 SMEAR GRAM STAIN: CPT | Performed by: OTOLARYNGOLOGY

## 2018-07-18 PROCEDURE — 87075 CULTR BACTERIA EXCEPT BLOOD: CPT | Performed by: OTOLARYNGOLOGY

## 2018-07-18 NOTE — PROGRESS NOTES
Subjective   Pita Hyatt is a 73 y.o. female.       History of Present Illness   Patient was seen yesterday with refractory mucopurulent right-sided sinusitis following previous endoscopic sinus surgery.  She is here today for a culture      The following portions of the patient's history were reviewed and updated as appropriate: allergies, current medications, past family history, past medical history, past social history, past surgical history and problem list.     reports that she quit smoking about 38 years ago. She has never used smokeless tobacco. She reports that she drinks alcohol. She reports that she does not use drugs.   Patient is not a tobacco user and has not been counseled for use of tobacco products      Review of Systems        Objective   Physical Exam    Zay-Synephrine and Xylocaine are placed in the nares on cotton and allowed to remain for approximate 5 minutes.  Upon removal the 0° scope is used just for visualization of the right middle meatus and using a Tym-tap a specimen of mucopurulent material is suctioned directly from the right maxillary sinus for culture.    Assessment/Plan   Georgia was seen today for follow-up.    Diagnoses and all orders for this visit:    Chronic maxillary sinusitis  -     Anaerobic Culture - Aspirate, Sinus, maxillary right          Plan: Aspirate will be sent for aerobic, anaerobic, and fungal culture.  Once an organism identified and sensitivities are known will use culture directed antibiotic therapy.  In the meantime continue saline nasal spray frequently.

## 2018-07-21 LAB
BACTERIA SPEC ANAEROBE CULT: NORMAL
GRAM STN SPEC: NORMAL
GRAM STN SPEC: NORMAL

## 2018-07-22 ENCOUNTER — TELEPHONE (OUTPATIENT)
Dept: OTOLARYNGOLOGY | Facility: CLINIC | Age: 73
End: 2018-07-22

## 2018-07-22 RX ORDER — CLARITHROMYCIN 500 MG/1
500 TABLET, COATED ORAL EVERY 12 HOURS SCHEDULED
Qty: 42 TABLET | Refills: 0 | Status: SHIPPED | OUTPATIENT
Start: 2018-07-22 | End: 2018-08-24

## 2018-07-22 NOTE — TELEPHONE ENCOUNTER
----- Message from Queenie ALAINA Zamora sent at 7/20/2018  3:54 PM CDT -----  Contact: [phone]  Called for culture results.    Informed patient that cultures both aerobic and anaerobic showed no growth and therefore the only option is to treat her empirically.  She reports she is now no longer taking Celexa at all and therefore Biaxin should be an option for her.  Will prescribed Biaxin 500 mg twice a day for 3 weeks and have her return to the [place] office shortly thereafter for reevaluation.  She is agreeable to this plan.

## 2018-07-23 LAB
BACTERIA SPEC AEROBE CULT: NORMAL
GRAM STN SPEC: NORMAL
GRAM STN SPEC: NORMAL

## 2018-08-09 RX ORDER — ERGOCALCIFEROL 1.25 MG/1
50000 CAPSULE ORAL WEEKLY
Qty: 4 CAPSULE | Refills: 2 | Status: SHIPPED | OUTPATIENT
Start: 2018-08-09 | End: 2018-11-06 | Stop reason: SDUPTHER

## 2018-08-14 ENCOUNTER — OFFICE VISIT (OUTPATIENT)
Dept: FAMILY MEDICINE CLINIC | Facility: CLINIC | Age: 73
End: 2018-08-14

## 2018-08-14 VITALS
DIASTOLIC BLOOD PRESSURE: 71 MMHG | SYSTOLIC BLOOD PRESSURE: 125 MMHG | TEMPERATURE: 98.7 F | WEIGHT: 227 LBS | OXYGEN SATURATION: 98 % | BODY MASS INDEX: 35.63 KG/M2 | HEIGHT: 67 IN | HEART RATE: 92 BPM | RESPIRATION RATE: 20 BRPM

## 2018-08-14 DIAGNOSIS — N39.0 URINARY TRACT INFECTION WITHOUT HEMATURIA, SITE UNSPECIFIED: Primary | ICD-10-CM

## 2018-08-14 DIAGNOSIS — R11.0 NAUSEA: ICD-10-CM

## 2018-08-14 LAB
BILIRUB BLD-MCNC: NEGATIVE MG/DL
CLARITY, POC: ABNORMAL
COLOR UR: ABNORMAL
GLUCOSE UR STRIP-MCNC: NEGATIVE MG/DL
KETONES UR QL: NEGATIVE
LEUKOCYTE EST, POC: ABNORMAL
NITRITE UR-MCNC: POSITIVE MG/ML
PH UR: 6 [PH] (ref 5–8)
PROT UR STRIP-MCNC: ABNORMAL MG/DL
RBC # UR STRIP: NEGATIVE /UL
SP GR UR: 1.01 (ref 1–1.03)
UROBILINOGEN UR QL: NORMAL

## 2018-08-14 PROCEDURE — 99214 OFFICE O/P EST MOD 30 MIN: CPT | Performed by: NURSE PRACTITIONER

## 2018-08-14 PROCEDURE — 81002 URINALYSIS NONAUTO W/O SCOPE: CPT | Performed by: NURSE PRACTITIONER

## 2018-08-14 PROCEDURE — 87086 URINE CULTURE/COLONY COUNT: CPT | Performed by: NURSE PRACTITIONER

## 2018-08-14 RX ORDER — SULFAMETHOXAZOLE AND TRIMETHOPRIM 800; 160 MG/1; MG/1
1 TABLET ORAL 2 TIMES DAILY
Qty: 20 TABLET | Refills: 0 | Status: SHIPPED | OUTPATIENT
Start: 2018-08-14 | End: 2018-08-17 | Stop reason: SINTOL

## 2018-08-14 RX ORDER — PHENAZOPYRIDINE HYDROCHLORIDE 200 MG/1
200 TABLET, FILM COATED ORAL 3 TIMES DAILY PRN
Qty: 12 TABLET | Refills: 0 | Status: SHIPPED | OUTPATIENT
Start: 2018-08-14 | End: 2018-10-11

## 2018-08-14 NOTE — PROGRESS NOTES
Subjective   Pita Hyatt is a 73 y.o. female.     Here today with nausea and fatigue for the past 2 days.  Feet are swelling.        Nausea   This is a new problem. The current episode started in the past 7 days. The problem occurs constantly. The problem has been waxing and waning. Associated symptoms include fatigue and nausea. Pertinent negatives include no anorexia, arthralgias, change in bowel habit, chills, congestion, coughing, diaphoresis, joint swelling, myalgias, neck pain, rash, sore throat, swollen glands, urinary symptoms, vertigo, visual change or vomiting. Nothing aggravates the symptoms. She has tried nothing for the symptoms. The treatment provided no relief.   Diarrhea    This is a new problem. The current episode started in the past 7 days. The problem occurs 5 to 10 times per day. The problem has been waxing and waning. The stool consistency is described as watery. The patient states that diarrhea does not awaken her from sleep. Pertinent negatives include no arthralgias, chills, coughing, myalgias or vomiting. Exacerbated by: possible antibiotics. Risk factors include recent antibiotic use. She has tried nothing for the symptoms. The treatment provided no relief.        The following portions of the patient's history were reviewed and updated as appropriate: allergies, current medications, past family history, past medical history, past social history, past surgical history and problem list.    Review of Systems   Constitutional: Positive for fatigue. Negative for chills and diaphoresis.   HENT: Negative.  Negative for congestion and sore throat.    Respiratory: Negative.  Negative for cough.    Cardiovascular: Negative.    Gastrointestinal: Positive for diarrhea and nausea. Negative for anorexia, change in bowel habit and vomiting.   Musculoskeletal: Negative.  Negative for arthralgias, joint swelling, myalgias and neck pain.   Skin: Negative.  Negative for rash.   Neurological: Negative.   Negative for vertigo.   Psychiatric/Behavioral: Negative.        Objective   Physical Exam   Constitutional: She is oriented to person, place, and time. She appears well-developed and well-nourished. No distress.   HENT:   Head: Normocephalic.   Right Ear: External ear normal.   Left Ear: External ear normal.   Mouth/Throat: Oropharynx is clear and moist. No oropharyngeal exudate.   Eyes: Pupils are equal, round, and reactive to light.   Neck: Normal range of motion. Neck supple.   Cardiovascular: Normal rate, regular rhythm and normal heart sounds.  Exam reveals no friction rub.    No murmur heard.  Pulmonary/Chest: Effort normal and breath sounds normal. No respiratory distress. She has no wheezes. She has no rales.   Abdominal: Soft.   Musculoskeletal: Normal range of motion.   Neurological: She is alert and oriented to person, place, and time.   Skin: Skin is warm and dry.   Psychiatric: She has a normal mood and affect. Thought content normal.   Nursing note and vitals reviewed.        Assessment/Plan   Georgia was seen today for nausea and diarrhea.    Diagnoses and all orders for this visit:    Urinary tract infection without hematuria, site unspecified  -     sulfamethoxazole-trimethoprim (BACTRIM DS,SEPTRA DS) 800-160 MG per tablet; Take 1 tablet by mouth 2 (Two) Times a Day.  -     phenazopyridine (PYRIDIUM) 200 MG tablet; Take 1 tablet by mouth 3 (Three) Times a Day As Needed for bladder spasms.  -     Urine Culture - Urine, Urine, Random Void; Future  -     Urine Culture - Urine, Urine, Random Void    Nausea  -     POCT urinalysis dipstick, manual      Brief Urine Lab Results  (Last result in the past 365 days)      Color   Clarity   Blood   Leuk Est   Nitrite   Protein   CREAT   Urine HCG        08/14/18 1520 Orange(A) Cloudy(A) Negative [address] [place]/ul(A) Positive(A) Trace(A)

## 2018-08-15 LAB — FUNGUS WND CULT: NORMAL

## 2018-08-16 DIAGNOSIS — R11.0 NAUSEA: Primary | ICD-10-CM

## 2018-08-16 LAB — BACTERIA SPEC AEROBE CULT: NORMAL

## 2018-08-16 RX ORDER — ONDANSETRON 4 MG/1
4 TABLET, FILM COATED ORAL 4 TIMES DAILY PRN
Qty: 20 TABLET | Refills: 0 | Status: SHIPPED | OUTPATIENT
Start: 2018-08-16 | End: 2019-01-25 | Stop reason: SDUPTHER

## 2018-08-17 ENCOUNTER — OFFICE VISIT (OUTPATIENT)
Dept: FAMILY MEDICINE CLINIC | Facility: CLINIC | Age: 73
End: 2018-08-17

## 2018-08-17 VITALS
WEIGHT: 227 LBS | HEART RATE: 72 BPM | RESPIRATION RATE: 20 BRPM | OXYGEN SATURATION: 98 % | HEIGHT: 67 IN | DIASTOLIC BLOOD PRESSURE: 72 MMHG | TEMPERATURE: 98.7 F | BODY MASS INDEX: 35.63 KG/M2 | SYSTOLIC BLOOD PRESSURE: 135 MMHG

## 2018-08-17 DIAGNOSIS — N39.0 URINARY TRACT INFECTION WITHOUT HEMATURIA, SITE UNSPECIFIED: Primary | ICD-10-CM

## 2018-08-17 DIAGNOSIS — R11.0 NAUSEA: ICD-10-CM

## 2018-08-17 PROCEDURE — 99214 OFFICE O/P EST MOD 30 MIN: CPT | Performed by: NURSE PRACTITIONER

## 2018-08-17 RX ORDER — NITROFURANTOIN 25; 75 MG/1; MG/1
100 CAPSULE ORAL EVERY 12 HOURS SCHEDULED
Qty: 20 CAPSULE | Refills: 0 | Status: SHIPPED | OUTPATIENT
Start: 2018-08-17 | End: 2018-10-11

## 2018-08-17 NOTE — PROGRESS NOTES
Subjective   Pita Hyatt is a 73 y.o. female.     Here today for josephine on her uti.  She was unable to tolerate the bactrim.  She cont to have some nausea.      Nausea   This is a recurrent problem. The current episode started in the past 7 days. The problem occurs intermittently. The problem has been waxing and waning. Associated symptoms include nausea. Pertinent negatives include no abdominal pain, anorexia, arthralgias, change in bowel habit, chest pain, chills, congestion, coughing, fatigue, fever, headaches, joint swelling, myalgias, neck pain, numbness, rash, sore throat, swollen glands, urinary symptoms, vertigo, visual change, vomiting or weakness. The symptoms are aggravated by eating (some meds). Treatments tried: emetrol and zofran. The treatment provided moderate relief.   Urinary Tract Infection    This is a new problem. The current episode started in the past 7 days. The problem occurs every urination. The problem has been unchanged. The quality of the pain is described as burning. The pain is at a severity of 2/10. The pain is mild. There has been no fever. She is not sexually active. There is no history of pyelonephritis. Associated symptoms include frequency, hematuria, nausea and urgency. Pertinent negatives include no chills, discharge, flank pain, hesitancy, possible pregnancy, sweats or vomiting. She has tried antibiotics and increased fluids for the symptoms. The treatment provided no relief.        The following portions of the patient's history were reviewed and updated as appropriate: allergies, current medications, past family history, past medical history, past social history, past surgical history and problem list.    Review of Systems   Constitutional: Negative.  Negative for chills, fatigue and fever.   HENT: Negative.  Negative for congestion and sore throat.    Respiratory: Negative.  Negative for cough.    Cardiovascular: Negative.  Negative for chest pain.   Gastrointestinal:  Positive for nausea. Negative for abdominal pain, anorexia, change in bowel habit and vomiting.   Genitourinary: Positive for frequency, hematuria and urgency. Negative for flank pain and hesitancy.   Musculoskeletal: Negative.  Negative for arthralgias, joint swelling, myalgias and neck pain.   Skin: Negative.  Negative for rash.   Neurological: Negative.  Negative for vertigo, weakness and numbness.   Psychiatric/Behavioral: Negative.        Objective   Physical Exam   Constitutional: She is oriented to person, place, and time. She appears well-developed and well-nourished. No distress.   HENT:   Head: Normocephalic.   Eyes: Pupils are equal, round, and reactive to light.   Neck: Normal range of motion. Neck supple. No thyromegaly present.   Cardiovascular: Normal rate, regular rhythm and normal heart sounds.  Exam reveals no friction rub.    No murmur heard.  Pulmonary/Chest: Effort normal and breath sounds normal. No respiratory distress. She has no wheezes. She has no rales.   Abdominal: Soft. Bowel sounds are normal. She exhibits no distension and no mass. There is no tenderness. There is no guarding.   Musculoskeletal: Normal range of motion.   Neurological: She is alert and oriented to person, place, and time.   Skin: Skin is warm and dry.   Psychiatric: She has a normal mood and affect. Thought content normal.   Nursing note and vitals reviewed.        Assessment/Plan   Georgia was seen today for nausea and urinary tract infection.    Diagnoses and all orders for this visit:    Urinary tract infection without hematuria, site unspecified  -     nitrofurantoin, macrocrystal-monohydrate, (MACROBID) 100 MG capsule; Take 1 capsule by mouth Every 12 (Twelve) Hours.    Nausea      Have prev sent in some zofran.  May cont to take this for nausea.  Will give macrobid and see if she tolerates it better.

## 2018-08-22 ENCOUNTER — TELEPHONE (OUTPATIENT)
Dept: FAMILY MEDICINE CLINIC | Facility: CLINIC | Age: 73
End: 2018-08-22

## 2018-08-23 RX ORDER — SULINDAC 200 MG/1
200 TABLET ORAL 2 TIMES DAILY WITH MEALS
Qty: 60 TABLET | Refills: 2 | Status: CANCELLED | OUTPATIENT
Start: 2018-08-23

## 2018-08-23 NOTE — TELEPHONE ENCOUNTER
Stop mobic and start clinoril if not better by Monday call and make. Patient stated she had taken one of her sisters Lortab because she was hurting so bad.  She is aware that she should not be doing that.  She said she was going to the ER to see if they can give her something stronger for the pain because I explained to her [facility] did no prescribe that kind of medication.

## 2018-08-24 ENCOUNTER — OFFICE VISIT (OUTPATIENT)
Dept: OTOLARYNGOLOGY | Facility: CLINIC | Age: 73
End: 2018-08-24

## 2018-08-24 VITALS — OXYGEN SATURATION: 98 % | HEIGHT: 67 IN | BODY MASS INDEX: 35.47 KG/M2 | WEIGHT: 226 LBS

## 2018-08-24 DIAGNOSIS — J32.8 OTHER CHRONIC SINUSITIS: Primary | ICD-10-CM

## 2018-08-24 DIAGNOSIS — J32.4 CHRONIC PANSINUSITIS: Primary | ICD-10-CM

## 2018-08-24 PROCEDURE — 99212 OFFICE O/P EST SF 10 MIN: CPT | Performed by: OTOLARYNGOLOGY

## 2018-08-24 NOTE — PROGRESS NOTES
Subjective   Pita Hyatt is a 73 y.o. female.       History of Present Illness     Patient is status post endoscopic sinus surgery.  She has had refractory mucopurulent right-sided sinusitis.  Cultures were obtained but did not grow any organisms, either anaerobic aerobic or fungal.  Has been treated empirically with multiple rounds of antibiotics including Ceftin, Levaquin and most recently Biaxin.  She says she is better than she was before but still feels significantly congested on the right side.    The following portions of the patient's history were reviewed and updated as appropriate: allergies, current medications, past family history, past medical history, past social history, past surgical history and problem list.     reports that she quit smoking about 38 years ago. She has never used smokeless tobacco. She reports that she drinks alcohol. She reports that she does not use drugs.   Patient is not a tobacco user and has not been counseled for use of tobacco products      Review of Systems   Constitutional: Negative for fever.           Objective   Physical Exam  Zay-Synephrine and Xylocaine are instilled the nares and 0° scope is used to facilitate examination of the nose (full nasal endoscopy not performed)  Mucopurulent secretions remain in the right middle meatus along with synechia present.  Mucoid secretions are also noted in the left middle meatus although they do not appear purulent.      Assessment/Plan   Georgia was seen today for follow-up.    Diagnoses and all orders for this visit:    Chronic pansinusitis        Plan: Obtain repeat CT scan and depending on findings consider revision surgery since she has failed multiple attempts at outpatient therapy.

## 2018-08-28 ENCOUNTER — OFFICE VISIT (OUTPATIENT)
Dept: FAMILY MEDICINE CLINIC | Facility: CLINIC | Age: 73
End: 2018-08-28

## 2018-08-28 VITALS
BODY MASS INDEX: 35.79 KG/M2 | HEIGHT: 67 IN | DIASTOLIC BLOOD PRESSURE: 70 MMHG | OXYGEN SATURATION: 98 % | SYSTOLIC BLOOD PRESSURE: 128 MMHG | HEART RATE: 97 BPM | WEIGHT: 228 LBS | TEMPERATURE: 97.7 F | RESPIRATION RATE: 20 BRPM

## 2018-08-28 DIAGNOSIS — N30.00 ACUTE CYSTITIS WITHOUT HEMATURIA: ICD-10-CM

## 2018-08-28 DIAGNOSIS — M54.6 THORACIC BACK PAIN, UNSPECIFIED BACK PAIN LATERALITY, UNSPECIFIED CHRONICITY: Primary | ICD-10-CM

## 2018-08-28 LAB
BILIRUB BLD-MCNC: NEGATIVE MG/DL
CLARITY, POC: CLEAR
COLOR UR: ABNORMAL
GLUCOSE UR STRIP-MCNC: NEGATIVE MG/DL
KETONES UR QL: NEGATIVE
LEUKOCYTE EST, POC: ABNORMAL
NITRITE UR-MCNC: NEGATIVE MG/ML
PH UR: 6 [PH] (ref 5–8)
PROT UR STRIP-MCNC: NEGATIVE MG/DL
RBC # UR STRIP: ABNORMAL /UL
SP GR UR: 1 (ref 1–1.03)
UROBILINOGEN UR QL: NORMAL

## 2018-08-28 PROCEDURE — 87086 URINE CULTURE/COLONY COUNT: CPT | Performed by: NURSE PRACTITIONER

## 2018-08-28 PROCEDURE — 81002 URINALYSIS NONAUTO W/O SCOPE: CPT | Performed by: NURSE PRACTITIONER

## 2018-08-28 PROCEDURE — 99214 OFFICE O/P EST MOD 30 MIN: CPT | Performed by: NURSE PRACTITIONER

## 2018-08-28 RX ORDER — TRAMADOL HYDROCHLORIDE 50 MG/1
50 TABLET ORAL EVERY 8 HOURS PRN
Qty: 90 TABLET | Refills: 0 | Status: SHIPPED | OUTPATIENT
Start: 2018-08-28 | End: 2018-11-27

## 2018-08-29 ENCOUNTER — OFFICE VISIT (OUTPATIENT)
Dept: OTOLARYNGOLOGY | Facility: CLINIC | Age: 73
End: 2018-08-29

## 2018-08-29 ENCOUNTER — HOSPITAL ENCOUNTER (OUTPATIENT)
Dept: CT IMAGING | Facility: HOSPITAL | Age: 73
Discharge: HOME | End: 2018-08-29
Admitting: OTOLARYNGOLOGY

## 2018-08-29 VITALS — BODY MASS INDEX: 52.77 KG/M2 | OXYGEN SATURATION: 97 % | HEIGHT: 55 IN | WEIGHT: 228 LBS

## 2018-08-29 DIAGNOSIS — J01.41 ACUTE RECURRENT PANSINUSITIS: Primary | ICD-10-CM

## 2018-08-29 DIAGNOSIS — J32.8 OTHER CHRONIC SINUSITIS: ICD-10-CM

## 2018-08-29 PROCEDURE — 70486 CT MAXILLOFACIAL W/O DYE: CPT

## 2018-08-29 PROCEDURE — 99213 OFFICE O/P EST LOW 20 MIN: CPT | Performed by: OTOLARYNGOLOGY

## 2018-08-29 NOTE — PROGRESS NOTES
Subjective   Pita Hyatt is a 73 y.o. female.     History of Present Illness   Patient is status post bilateral endoscopic maxillary sinusotomy ethmoidectomy and frontal recess exploration.  Has had refractory and rapidly recurrent acute sinusitis despite multiple rounds of antibiotics including three-week courses of Ceftin, Levaquin, and Biaxin.  Continues to have observable purulent material primarily in the right middle meatus.  Returns today having undergone a CT scan of the sinuses.  This is personally reviewed.  Postsurgical changes are noted.  On the right side there are some retained ethmoid cells particularly superiorly and posteriorly.  There appears to be soft tissue obstruction of the maxillary sinus outflow tract as well as bilateral obstruction of the frontal sinus ostia with complete opacification of the bilateral frontal sinuses the right maxillary sinus and the remaining right ethmoid air cells.  The left maxillary sinus and left ethmoid appear to be well drained with just some mucosal thickening that is likely postsurgical change.  Patient is here today to discuss treatment options.  She has previously underwent aspiration of the right maxillary sinus for culture with no growth.  This was followed by empiric treatment with broad-spectrum antibiotics as noted previously, which has failed to improve her symptoms.      The following portions of the patient's history were reviewed and updated as appropriate: allergies, current medications, past family history, past medical history, past social history, past surgical history and problem list.      Pita Hyatt reports that she quit smoking about 38 years ago. She has never used smokeless tobacco. She reports that she drinks alcohol. She reports that she does not use drugs.  Patient is not a tobacco user and has not been counseled for use of tobacco products    Family History   Problem Relation Age of Onset   • Hypertension Other    • Stroke  Other    • Diabetes Other    • Ulcers Other    • Cancer Other        Allergies   Allergen Reactions   • Phenergan [Promethazine] Itching         Current Outpatient Prescriptions:   •  Cetirizine-Pseudoephedrine (ZYRTEC-D PO), Take 1 tablet by mouth 2 (Two) Times a Day., Disp: , Rfl:   •  cetirizine-pseudoephedrine (ZyrTEC-D) 5-120 MG per 12 hr tablet, Take 1 tablet by mouth 2 (Two) Times a Day., Disp: 60 tablet, Rfl: 3  •  cycloSPORINE (RESTASIS) 0.05 % ophthalmic emulsion, Apply 1 drop to eye Every 12 (Twelve) Hours., Disp: 5.5 mL, Rfl: 5  •  fluticasone (FLONASE) 50 MCG/ACT nasal spray, 2 sprays into each nostril Daily., Disp: , Rfl:   •  levothyroxine (SYNTHROID, LEVOTHROID) 100 MCG tablet, Take 1 tablet by mouth Daily., Disp: 30 tablet, Rfl: 3  •  omeprazole (priLOSEC) 20 MG capsule, Take 20 mg by mouth Daily., Disp: , Rfl:   •  ondansetron (ZOFRAN) 4 MG tablet, Take 1 tablet by mouth 4 (Four) Times a Day As Needed for Nausea., Disp: 20 tablet, Rfl: 0  •  phenazopyridine (PYRIDIUM) 200 MG tablet, Take 1 tablet by mouth 3 (Three) Times a Day As Needed for bladder spasms., Disp: 12 tablet, Rfl: 0  •  thiamine (VITAMIN B-1) 100 MG tablet, Take 100 mg by mouth Daily., Disp: , Rfl:   •  traMADol (ULTRAM) 50 MG tablet, Take 1 tablet by mouth Every 8 (Eight) Hours As Needed for Moderate Pain ., Disp: 90 tablet, Rfl: 0  •  vitamin D (ERGOCALCIFEROL) 72320 units capsule capsule, Take 1 capsule by mouth 1 (One) Time Per Week., Disp: 4 capsule, Rfl: 2  •  doxepin (SINEquan) 25 MG capsule, Take 25 mg by mouth 3 (Three) Times a Day As Needed for Sleep. 1-2 tab three times daily as needed, Disp: , Rfl:   •  meloxicam (MOBIC) 15 MG tablet, Take 1 tablet by mouth Daily., Disp: 30 tablet, Rfl: 3  •  nitrofurantoin, macrocrystal-monohydrate, (MACROBID) 100 MG capsule, Take 1 capsule by mouth Every 12 (Twelve) Hours., Disp: 20 capsule, Rfl: 0    Past Medical History:   Diagnosis Date   • Acquired hypothyroidism    • Acute  bronchitis    • Acute gastritis    • Acute maxillary sinusitis    • Acute maxillary sinusitis, unspecified    • Acute serous otitis media     R   • Acute sinusitis     Could be R mastoid    • Allergic rhinitis    • Anxiety state    • Chronic sinusitis    • Conjunctivitis    • Cough    • Degenerative joint disease of shoulder region    • Depressive disorder     improving   • Diarrhea    • Dysfunction of eustachian tube    • Epigastric pain    • Female stress incontinence    • Forgetfulness    • Functional diarrhea    • Gynecological Examination    • Knee joint replaced by other means    • Malaise and fatigue    • Menopause    • Nausea and vomiting    • Other Chest pain    • Polyp of colon    • Preop examination, unspecified    • Rhinitis medicamentosa    • Shoulder pain    • Skin tag- removal    • Subclinical hypothyroidism    • Vagal reaction          Review of Systems   Constitutional: Negative for fever.   Musculoskeletal: Positive for back pain.           Objective   Physical Exam  General: Well-developed well-nourished female in no acute distress.   Voice:Strong. Speech:Fluent  Ears: No discharge.  Tympanic membranes intact  Nose: Nares show boggy mucosa.  No purulence noted on anterior rhinoscopy.  Nasal endoscopy is not repeated because it has been performed recently   Oral cavity: No mucosal lesions.    Pharynx: No erythema exudate mass or ulcer  Neck: No lymphadenopathy.  No thyromegaly.  Trachea and larynx midline.  No masses in the parotid or submandibular glands.  Chest: Clear.  Heart: Regular.  Abdomen: Benign.      Assessment/Plan   Georgia was seen today for follow-up.    Diagnoses and all orders for this visit:    Acute recurrent pansinusitis      Plan: Offered to perform revision endoscopic right maxillary antrostomy ethmoidectomy and bilateral frontal sinusotomy.  I explained the nature of these procedures to the patient in layman's terms including need for general anesthetic and risks including  bleeding, damage to the eyes or base of the brain, failure to improve symptoms, recurrence of infection, and need for postoperative debridements with which she is familiar.  Proposed benefit would be drainage of refractory infection.  The alternative would be observation with continued medical management.  Patient voices understanding of all the above and wishes to proceed.  This be scheduled.

## 2018-08-30 ENCOUNTER — HOSPITAL ENCOUNTER (OUTPATIENT)
Facility: HOSPITAL | Age: 73
Setting detail: HOSPITAL OUTPATIENT SURGERY
End: 2018-08-30
Attending: OTOLARYNGOLOGY | Admitting: OTOLARYNGOLOGY

## 2018-08-30 ENCOUNTER — PREP FOR SURGERY (OUTPATIENT)
Dept: OTHER | Facility: HOSPITAL | Age: 73
End: 2018-08-30

## 2018-08-30 ENCOUNTER — TELEPHONE (OUTPATIENT)
Dept: FAMILY MEDICINE CLINIC | Facility: CLINIC | Age: 73
End: 2018-08-30

## 2018-08-30 DIAGNOSIS — J01.41 ACUTE RECURRENT PANSINUSITIS: Primary | ICD-10-CM

## 2018-08-30 LAB — BACTERIA SPEC AEROBE CULT: NORMAL

## 2018-08-30 RX ORDER — ACETAMINOPHEN AND CODEINE PHOSPHATE 300; 30 MG/1; MG/1
1 TABLET ORAL EVERY 4 HOURS PRN
Qty: 30 TABLET | Refills: 0 | Status: SHIPPED | OUTPATIENT
Start: 2018-08-30 | End: 2018-10-11

## 2018-08-30 RX ORDER — OXYMETAZOLINE HYDROCHLORIDE 0.05 G/100ML
3 SPRAY NASAL
Status: CANCELLED | OUTPATIENT
Start: 2018-09-17 | End: 2018-09-19

## 2018-08-30 NOTE — PROGRESS NOTES
Subjective   Pita Hyatt is a 73 y.o. female.     Here today with exacerbation of thoracic back pain over the past week.  No injuries reported.  Seems to be getting worse.  Has been taking meloxicam.  She has had a uti and this needs to be recked.  Sx for the uti improving with med.      Urinary Tract Infection    This is a new problem. The current episode started 1 to 4 weeks ago. The problem occurs intermittently. The problem has been gradually improving. The quality of the pain is described as burning. The pain is at a severity of 0/10. The patient is experiencing no pain. There has been no fever. She is not sexually active. There is no history of pyelonephritis. Associated symptoms include frequency and urgency. Pertinent negatives include no chills, discharge, flank pain, hematuria, hesitancy, nausea, possible pregnancy, sweats or vomiting. She has tried antibiotics for the symptoms. The treatment provided significant relief. Her past medical history is significant for recurrent UTIs.   Back Pain   This is a new problem. The current episode started 1 to 4 weeks ago. The problem occurs constantly. The problem has been gradually worsening since onset. The pain is present in the thoracic spine. The quality of the pain is described as aching. The pain does not radiate. The pain is at a severity of 8/10. The pain is severe. The pain is the same all the time. The symptoms are aggravated by standing, sitting, lying down and position. Stiffness is present in the morning. Pertinent negatives include no abdominal pain, bladder incontinence, bowel incontinence, chest pain, dysuria, fever, headaches, leg pain, numbness, paresis, paresthesias, pelvic pain, perianal numbness, tingling, weakness or weight loss. She has tried muscle relaxant and NSAIDs for the symptoms. The treatment provided no relief.        The following portions of the patient's history were reviewed and updated as appropriate: allergies, current  medications, past family history, past medical history, past social history, past surgical history and problem list.    Review of Systems   Constitutional: Negative.  Negative for chills, fever and unexpected weight loss.   HENT: Negative.    Respiratory: Negative.    Cardiovascular: Negative.  Negative for chest pain.   Gastrointestinal: Negative for abdominal pain, bowel incontinence, nausea and vomiting.   Genitourinary: Positive for frequency and urgency. Negative for urinary incontinence, dysuria, flank pain, hematuria, hesitancy and pelvic pain.   Musculoskeletal: Positive for back pain.   Neurological: Negative.  Negative for tingling, weakness, numbness and paresthesias.   Psychiatric/Behavioral: Negative.        Objective   Physical Exam   Constitutional: She is oriented to person, place, and time. She appears well-developed and well-nourished. No distress.   HENT:   Head: Normocephalic.   Eyes: Pupils are equal, round, and reactive to light.   Neck: Normal range of motion. Neck supple. No thyromegaly present.   Cardiovascular: Normal rate, regular rhythm and normal heart sounds.  Exam reveals no friction rub.    No murmur heard.  Pulmonary/Chest: Effort normal and breath sounds normal. No respiratory distress. She has no wheezes. She has no rales.   Abdominal: Soft.   Musculoskeletal: Normal range of motion.        Thoracic back: She exhibits tenderness, pain and spasm. She exhibits normal range of motion.   X ray reviewed and shows degenerative changes.   Neurological: She is alert and oriented to person, place, and time.   Skin: Skin is warm and dry.   Psychiatric: She has a normal mood and affect.   Nursing note and vitals reviewed.        Assessment/Plan   [place]ia was seen today for urinary tract infection and upper back pain.    Diagnoses and all orders for this visit:    Thoracic back pain, unspecified back pain laterality, unspecified chronicity  -     XR Spine Thoracic 4+ View (In Office)  -      traMADol (ULTRAM) 50 MG tablet; Take 1 tablet by mouth Every 8 (Eight) Hours As Needed for Moderate Pain .    Acute cystitis without hematuria  -     POCT urinalysis dipstick, manual  -     Urine Culture - Urine, Urine, Random Void; Future  -     Urine Culture - Urine, Urine, Random Void      She is going to physical therapy but wants to go to a private therapist who she has had dealings with before.  Will write the order for this.    [facility] # [id number] is reviewed.

## 2018-09-11 ENCOUNTER — TELEPHONE (OUTPATIENT)
Dept: OTOLARYNGOLOGY | Facility: CLINIC | Age: 73
End: 2018-09-11

## 2018-09-11 RX ORDER — CLARITHROMYCIN 500 MG/1
500 TABLET, COATED ORAL 2 TIMES DAILY
Qty: 42 TABLET | Refills: 0 | Status: SHIPPED | OUTPATIENT
Start: 2018-09-11 | End: 2018-10-11

## 2018-09-12 ENCOUNTER — APPOINTMENT (OUTPATIENT)
Dept: PREADMISSION TESTING | Facility: HOSPITAL | Age: 73
End: 2018-09-12

## 2018-09-13 ENCOUNTER — OFFICE VISIT (OUTPATIENT)
Dept: FAMILY MEDICINE CLINIC | Facility: CLINIC | Age: 73
End: 2018-09-13

## 2018-09-13 VITALS
BODY MASS INDEX: 35.31 KG/M2 | HEART RATE: 91 BPM | RESPIRATION RATE: 18 BRPM | HEIGHT: 67 IN | TEMPERATURE: 97.3 F | WEIGHT: 225 LBS | SYSTOLIC BLOOD PRESSURE: 111 MMHG | OXYGEN SATURATION: 98 % | DIASTOLIC BLOOD PRESSURE: 63 MMHG

## 2018-09-13 DIAGNOSIS — M54.6 BILATERAL THORACIC BACK PAIN, UNSPECIFIED CHRONICITY: Primary | ICD-10-CM

## 2018-09-13 DIAGNOSIS — M25.50 ARTHRALGIA, UNSPECIFIED JOINT: ICD-10-CM

## 2018-09-13 PROCEDURE — 99214 OFFICE O/P EST MOD 30 MIN: CPT | Performed by: NURSE PRACTITIONER

## 2018-09-13 PROCEDURE — 96372 THER/PROPH/DIAG INJ SC/IM: CPT | Performed by: NURSE PRACTITIONER

## 2018-09-13 RX ORDER — BETAMETHASONE SODIUM PHOSPHATE AND BETAMETHASONE ACETATE 3; 3 MG/ML; MG/ML
12 INJECTION, SUSPENSION INTRA-ARTICULAR; INTRALESIONAL; INTRAMUSCULAR; SOFT TISSUE ONCE
Status: COMPLETED | OUTPATIENT
Start: 2018-09-13 | End: 2018-09-13

## 2018-09-13 RX ORDER — DICLOFENAC SODIUM 75 MG/1
75 TABLET, DELAYED RELEASE ORAL 2 TIMES DAILY
Qty: 60 TABLET | Refills: 3 | Status: SHIPPED | OUTPATIENT
Start: 2018-09-13 | End: 2019-02-26

## 2018-09-13 RX ADMIN — BETAMETHASONE SODIUM PHOSPHATE AND BETAMETHASONE ACETATE 12 MG: 3; 3 INJECTION, SUSPENSION INTRA-ARTICULAR; INTRALESIONAL; INTRAMUSCULAR; SOFT TISSUE at 14:57

## 2018-09-14 NOTE — PROGRESS NOTES
Subjective   Pita Hyatt is a 73 y.o. female.     Here today with exacerbation of back pain and arthritis.  She is experiencing quite a bit of discomfort over the past week.  She is not taking her nsaid at present.      Arthritis   Presents for follow-up visit. She complains of pain and stiffness. The symptoms have been worsening. Affected location: multiple sites including back, neck, shoulders and knees. Her pain is at a severity of 5/10. Associated symptoms include fatigue. Pertinent negatives include no diarrhea, dry eyes, dry mouth, dysuria, fever, pain at night, pain while resting, rash, Raynaud's syndrome, uveitis or weight loss. Compliance with total regimen is 51-75%.        The following portions of the patient's history were reviewed and updated as appropriate: allergies, current medications, past family history, past medical history, past social history, past surgical history and problem list.    Review of Systems   Constitutional: Positive for fatigue. Negative for fever and unexpected weight loss.   HENT: Negative.    Respiratory: Negative.    Cardiovascular: Negative.    Gastrointestinal: Negative for diarrhea.   Genitourinary: Negative for dysuria.   Musculoskeletal: Positive for arthralgias, arthritis, back pain and stiffness.   Skin: Negative.  Negative for rash.   Neurological: Negative.    Psychiatric/Behavioral: Negative.        Objective   Physical Exam   Constitutional: She is oriented to person, place, and time. She appears well-developed and well-nourished. No distress.   HENT:   Head: Normocephalic.   Eyes: Pupils are equal, round, and reactive to light.   Neck: Normal range of motion. Neck supple. No thyromegaly present.   Cardiovascular: Normal rate, regular rhythm and normal heart sounds.  Exam reveals no friction rub.    No murmur heard.  Pulmonary/Chest: Effort normal and breath sounds normal. No respiratory distress. She has no wheezes. She has no rales.   Abdominal: Soft.    Musculoskeletal: She exhibits tenderness.        Right shoulder: She exhibits tenderness.        Left shoulder: She exhibits tenderness.        Right knee: Tenderness found.        Left knee: Tenderness found.        Thoracic back: She exhibits tenderness and pain. She exhibits normal range of motion.        Lumbar back: She exhibits tenderness and pain. She exhibits normal range of motion.   Neurological: She is alert and oriented to person, place, and time.   Skin: Skin is warm and dry.   Psychiatric: She has a normal mood and affect. Thought content normal.   Nursing note and vitals reviewed.        Assessment/Plan   Georgia was seen today for arthritis.    Diagnoses and all orders for this visit:    Bilateral thoracic back pain, unspecified chronicity  -     diclofenac (VOLTAREN) 75 MG EC tablet; Take 1 tablet by mouth 2 (Two) Times a Day.  -     betamethasone acetate-betamethasone sodium phosphate (CELESTONE SOLUSPAN) injection 12 mg; Inject 2 mL into the appropriate muscle as directed by prescriber 1 (One) Time.    Arthralgia, unspecified joint

## 2018-09-17 ENCOUNTER — TELEPHONE (OUTPATIENT)
Dept: FAMILY MEDICINE CLINIC | Facility: CLINIC | Age: 73
End: 2018-09-17

## 2018-09-17 NOTE — TELEPHONE ENCOUNTER
Talked with patient and she can not decide what she is taking mobic or Voltaren and now she thinks it possible could have been then steroid shot so not sure what she wants to do says she will try the medication she took yesterday one more time

## 2018-09-17 NOTE — TELEPHONE ENCOUNTER
Patient called and she says the voltaren is causig her to have the shakes and crying a lot and wants to know what to do. Started taking it yesterday.

## 2018-10-11 ENCOUNTER — OFFICE VISIT (OUTPATIENT)
Dept: FAMILY MEDICINE CLINIC | Facility: CLINIC | Age: 73
End: 2018-10-11

## 2018-10-11 ENCOUNTER — APPOINTMENT (OUTPATIENT)
Dept: LAB | Facility: HOSPITAL | Age: 73
End: 2018-10-11

## 2018-10-11 VITALS
HEART RATE: 89 BPM | TEMPERATURE: 98.3 F | BODY MASS INDEX: 36.26 KG/M2 | DIASTOLIC BLOOD PRESSURE: 71 MMHG | HEIGHT: 67 IN | OXYGEN SATURATION: 98 % | WEIGHT: 231 LBS | SYSTOLIC BLOOD PRESSURE: 130 MMHG | RESPIRATION RATE: 20 BRPM

## 2018-10-11 DIAGNOSIS — R30.0 DYSURIA: ICD-10-CM

## 2018-10-11 DIAGNOSIS — R60.9 EDEMA, UNSPECIFIED TYPE: Primary | ICD-10-CM

## 2018-10-11 DIAGNOSIS — G47.00 INSOMNIA, UNSPECIFIED TYPE: ICD-10-CM

## 2018-10-11 DIAGNOSIS — N39.3 STRESS INCONTINENCE: ICD-10-CM

## 2018-10-11 PROCEDURE — 80053 COMPREHEN METABOLIC PANEL: CPT | Performed by: NURSE PRACTITIONER

## 2018-10-11 PROCEDURE — 99214 OFFICE O/P EST MOD 30 MIN: CPT | Performed by: NURSE PRACTITIONER

## 2018-10-11 PROCEDURE — 87086 URINE CULTURE/COLONY COUNT: CPT | Performed by: NURSE PRACTITIONER

## 2018-10-11 RX ORDER — HYDROCHLOROTHIAZIDE 25 MG/1
25 TABLET ORAL DAILY
Qty: 30 TABLET | Refills: 3 | Status: SHIPPED | OUTPATIENT
Start: 2018-10-11 | End: 2018-11-27 | Stop reason: HOSPADM

## 2018-10-11 RX ORDER — LEVOTHYROXINE SODIUM 0.1 MG/1
100 TABLET ORAL DAILY
Qty: 30 TABLET | Refills: 3 | Status: SHIPPED | OUTPATIENT
Start: 2018-10-11 | End: 2019-01-02 | Stop reason: SDUPTHER

## 2018-10-11 RX ORDER — TRAZODONE HYDROCHLORIDE 50 MG/1
50 TABLET ORAL NIGHTLY
Qty: 30 TABLET | Refills: 3 | Status: SHIPPED | OUTPATIENT
Start: 2018-10-11 | End: 2018-11-27 | Stop reason: HOSPADM

## 2018-10-12 LAB
ALBUMIN SERPL-MCNC: 3 G/DL (ref 3.4–4.8)
ALBUMIN/GLOB SERPL: 1 G/DL (ref 1.1–1.8)
ALP SERPL-CCNC: 192 U/L (ref 38–126)
ALT SERPL W P-5'-P-CCNC: 59 U/L (ref 9–52)
ANION GAP SERPL CALCULATED.3IONS-SCNC: 7 MMOL/L (ref 5–15)
AST SERPL-CCNC: 89 U/L (ref 14–36)
BILIRUB SERPL-MCNC: 0.8 MG/DL (ref 0.2–1.3)
BUN BLD-MCNC: 13 MG/DL (ref 7–21)
BUN/CREAT SERPL: 18.6 (ref 7–25)
CALCIUM SPEC-SCNC: 9.9 MG/DL (ref 8.4–10.2)
CHLORIDE SERPL-SCNC: 109 MMOL/L (ref 95–110)
CO2 SERPL-SCNC: 24 MMOL/L (ref 22–31)
CREAT BLD-MCNC: 0.7 MG/DL (ref 0.5–1)
GFR SERPL CREATININE-BSD FRML MDRD: 82 ML/MIN/1.73 (ref 39–90)
GLOBULIN UR ELPH-MCNC: 3 GM/DL (ref 2.3–3.5)
GLUCOSE BLD-MCNC: 107 MG/DL (ref 60–100)
POTASSIUM BLD-SCNC: 4.4 MMOL/L (ref 3.5–5.1)
PROT SERPL-MCNC: 6 G/DL (ref 6.3–8.6)
SODIUM BLD-SCNC: 140 MMOL/L (ref 137–145)

## 2018-10-12 NOTE — PROGRESS NOTES
Subjective   Pita Hyatt is a 73 y.o. female.     Here today with swelling in her feet and ankles.  Started 2 weeks go.  She also has problems with stress incont and says it is getting worse.  She wants to see a surgeon about this problem.        Leg Swelling   This is a new problem. The current episode started 1 to 4 weeks ago. The problem occurs constantly. The problem has been unchanged. Pertinent negatives include no abdominal pain, anorexia, arthralgias, change in bowel habit, chest pain, chills, congestion, coughing, diaphoresis, fatigue, fever, headaches, joint swelling, myalgias, nausea, neck pain, numbness, rash, sore throat, swollen glands, urinary symptoms, vertigo, visual change, vomiting or weakness. Nothing aggravates the symptoms. She has tried nothing for the symptoms. The treatment provided no relief.   Urinary Frequency    Associated symptoms include frequency. Pertinent negatives include no chills, nausea or vomiting.        The following portions of the patient's history were reviewed and updated as appropriate: allergies, current medications, past family history, past medical history, past social history, past surgical history and problem list.    Review of Systems   Constitutional: Negative.  Negative for chills, diaphoresis, fatigue and fever.   HENT: Negative.  Negative for congestion and sore throat.    Respiratory: Negative.  Negative for cough.    Cardiovascular: Positive for leg swelling (pedal and ankle edema). Negative for chest pain.   Gastrointestinal: Negative for abdominal pain, anorexia, change in bowel habit, nausea and vomiting.   Genitourinary: Positive for frequency.   Musculoskeletal: Negative.  Negative for arthralgias, joint swelling, myalgias and neck pain.   Skin: Negative.  Negative for rash.   Neurological: Negative.  Negative for vertigo, weakness and numbness.   Psychiatric/Behavioral: Positive for agitation.       Objective   Physical Exam   Constitutional: She  is oriented to person, place, and time. She appears well-developed and well-nourished. No distress.   HENT:   Head: Normocephalic.   Eyes: Pupils are equal, round, and reactive to light.   Neck: Normal range of motion. Neck supple. No thyromegaly present.   Cardiovascular: Normal rate, regular rhythm and normal heart sounds.  Exam reveals no friction rub.    No murmur heard.  1+ edema in lower ext noted.   Pulmonary/Chest: Effort normal and breath sounds normal. No respiratory distress. She has no wheezes. She has no rales.   Abdominal: Soft.   Musculoskeletal: Normal range of motion.   Neurological: She is alert and oriented to person, place, and time.   Skin: Skin is warm and dry.   Psychiatric: She has a normal mood and affect. Thought content normal.   Nursing note and vitals reviewed.        Assessment/Plan   [place]ia was seen today for foot swelling and leg swelling.    Diagnoses and all orders for this visit:    Edema, unspecified type  -     Comprehensive metabolic panel  -     hydrochlorothiazide (HYDRODIURIL) 25 MG tablet; Take 1 tablet by mouth Daily.    Stress incontinence  -     Ambulatory Referral to Obstetrics / Gynecology    Dysuria  -     Urine Culture - Urine, Urine, Clean Catch    Insomnia, unspecified type  -     traZODone (DESYREL) 50 MG tablet; Take 1 tablet by mouth Every Night.    Other orders  -     levothyroxine (SYNTHROID, LEVOTHROID) 100 MCG tablet; Take 1 tablet by mouth Daily.

## 2018-10-13 LAB — BACTERIA SPEC AEROBE CULT: NORMAL

## 2018-10-23 ENCOUNTER — TELEPHONE (OUTPATIENT)
Dept: FAMILY MEDICINE CLINIC | Facility: CLINIC | Age: 73
End: 2018-10-23

## 2018-10-23 DIAGNOSIS — R73.01 ELEVATED FASTING BLOOD SUGAR: Primary | ICD-10-CM

## 2018-10-23 DIAGNOSIS — R60.9 EDEMA, UNSPECIFIED TYPE: ICD-10-CM

## 2018-10-23 NOTE — TELEPHONE ENCOUNTER
PATIENT AWARE WANTS APPOINTMENT WITH [facility] BECAUSE OF HER LEG SWELLING ALL THE TIME.  SISTER HAD SAME ISSUES AND HAD TO HAVE VEIN SURGERY

## 2018-10-23 NOTE — TELEPHONE ENCOUNTER
----- Message from SHAHZAD Sommer sent at 10/23/2018  8:06 AM CDT -----  She needs a a1c and her culture didn't grow anything.

## 2018-11-27 ENCOUNTER — OFFICE VISIT (OUTPATIENT)
Dept: CARDIAC SURGERY | Facility: CLINIC | Age: 73
End: 2018-11-27

## 2018-11-27 VITALS
HEART RATE: 84 BPM | TEMPERATURE: 97.9 F | BODY MASS INDEX: 34.92 KG/M2 | SYSTOLIC BLOOD PRESSURE: 162 MMHG | WEIGHT: 222.5 LBS | OXYGEN SATURATION: 99 % | DIASTOLIC BLOOD PRESSURE: 98 MMHG | HEIGHT: 67 IN

## 2018-11-27 DIAGNOSIS — R09.89 BRUIT: Primary | ICD-10-CM

## 2018-11-27 DIAGNOSIS — R60.0 LOCALIZED EDEMA: ICD-10-CM

## 2018-11-27 PROCEDURE — 99202 OFFICE O/P NEW SF 15 MIN: CPT | Performed by: THORACIC SURGERY (CARDIOTHORACIC VASCULAR SURGERY)

## 2018-11-27 NOTE — PROGRESS NOTES
"Pita Hyatt  1945            73 y.o.      2018    Chief Complaint   Patient presents with   • Leg Swelling   PCP SHAHZAD PINA    23-year-old woman referred by Rose Marie PIKE for edema both lower extremities.  The patient is very specific about the onset of the lower extremity edema stating that it had started about mid September of this year.  In her medication list diclofenac was begun 18.    HPI  Onset  lower extremity edema bilaterally mid September of this year      ROS  Her problem list includes  acute recurrent maxillary sinusitis, allergic rhinitis, vitamin D deficiency, hypothyroidism for which she is on thyroid replacement, right shoulder pain, stress incontinence for which she must wear appropriate absorptive underclothes.  And the above mentioned edema both lower extremities    In addition the patient goes into great detail about a group of symptoms which has been extensively evaluated by neurology in [place] with carotid scans and other evaluation leading to a diagnosis of \"vagal-vagal \"reflex syndrome.  She states that with any unexpected shock, stress, pain she may develop double vision, syncope and even pass out.  At first this was considered to be  Seizure activity , but after neurological evaluation of symptoms her constellation of symptoms has been   labeled \"vagal-vagal\" reflex syndrome.    FH Mother , Father  but history of mini-strokes but she insisted that she had undergone carotid artery scans in previous evaluations.   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    Current Outpatient Medications:   •  Cetirizine-Pseudoephedrine (ZYRTEC-D PO), Take 1 tablet by mouth 2 (Two) Times a Day., Disp: , Rfl:   •  cycloSPORINE (RESTASIS) 0.05 % ophthalmic emulsion, Apply 1 drop to eye Every 12 (Twelve) Hours., Disp: 5.5 mL, Rfl: 5  •  diclofenac (VOLTAREN) 75 MG " "EC tablet, Take 1 tablet by mouth 2 (Two) Times a Day., Disp: 60 tablet, Rfl: 3  •  fluticasone (FLONASE) 50 MCG/ACT nasal spray, 2 sprays into each nostril Daily., Disp: , Rfl:   •  levothyroxine (SYNTHROID, LEVOTHROID) 100 MCG tablet, Take 1 tablet by mouth Daily., Disp: 30 tablet, Rfl: 3  •  omeprazole (priLOSEC) 20 MG capsule, Take 20 mg by mouth Daily., Disp: , Rfl:   •  ondansetron (ZOFRAN) 4 MG tablet, Take 1 tablet by mouth 4 (Four) Times a Day As Needed for Nausea., Disp: 20 tablet, Rfl: 0  •  thiamine (VITAMIN B-1) 100 MG tablet, Take 100 mg by mouth Daily., Disp: , Rfl:   •  vitamin D (ERGOCALCIFEROL) 27608 units capsule capsule, TAKE ONE CAPSULE BY MOUTH ONCE WEEKLY, Disp: 4 capsule, Rfl: 3    The following portions of the patient's history were reviewed and updated as appropriate: allergies, current medications, past family history, past medical history, past social history, past surgical history and problem list.        Past Surgical History:   Procedure Laterality Date   • COLONOSCOPY  07/16/2012   • COLONOSCOPY  01/13/2017   • ENDOSCOPY AND COLONOSCOPY      Diverticulum in sigmoid colon. 2 polyps in ascending colon & descending colon; removed by snare cautery polypectomy   • HYSTERECTOMY     • INJECTION OF MEDICATION      celestone (betamethasone) 12mg   • INJECTION OF MEDICATION      Depo Medrol 2ml   • INJECTION OF MEDICATION      Rocephin 1gm   • JOINT REPLACEMENT Bilateral 2015 2013    basim total knee replacements   • REPLACEMENT TOTAL KNEE     • UPPER GASTROINTESTINAL ENDOSCOPY  07/16/2012   • UPPER GASTROINTESTINAL ENDOSCOPY  01/13/2017           Physical Exam  /98 (BP Location: Left arm)   Pulse 84   Temp 97.9 °F (36.6 °C) (Temporal)   Ht 170.2 cm (67\")   Wt 101 kg (222 lb 8 oz)   SpO2 99%   BMI 34.85 kg/m²     HEENT: No masses no bruits    CHEST: Clear to auscultation    HEART: Regular rate and rhythm    ABDOMEN: Soft nontender  slight pulsatility palpable upper " "abdomen    EXTREMITIES pulses are easily palpable dorsalis pedis posterior tibial arteries at the ankle.  At the ankle and above she does have significant edema of both lower extremities.:    VASCULAR LAB STUDIES:BILATERAL VENOUS DUPLEX SCAN (11-27-18)  As the venous duplex scan was begun with application of U/S jelly and with application of probe, patient responded as startled and with \"pain\" and would not allow continuation of Venous Duplex lower extremity. She insisted that the exam stop and that she be allowed to leave the Vascular Lab. She further stated that even pressure as with a massage is extremely painful to her extremities    U/S AORTA (11-17-18)  NO AAA  It is strange that she allowed U/S aorta for more pressure is usually required to visualize Aorta ultrasonographically than is applied for extremity venous duplex.            RADIOLOGY:      Bruit [R09.89]    IMPRESSION:    Edema both lower extremities at least coinciding with start of Diclofenac which lists  edema as known side effect.    We apologized to the patient about any discomfort that she encountered  I have talked with SHAHZAD Dolan regarding cessation or change in antiarthritic meds and I will call Ms. Hyatt  in short period of days to see if Cessation of Diclofenac has improved her edema.    Return to Vascular Surgery on PRN basisi              Assessment/Plan  [place]ia was seen today for leg swelling.    Diagnoses and all orders for this visit:    Bruit  -     Duplex Aorta IVC Iliac Graft Limited CAR    Localized edema  -     Duplex Venous Lower Extremity - Bilateral CAR                  No Follow-up on file.            Jack L. Hamman, MD  11/27/2018  "

## 2018-11-29 LAB
ABDOMINAL DIST AORTA AP: 1.17 CM
ABDOMINAL DIST AORTA TRANS: 1.09 CM
ABDOMINAL LT COM ILIAC AP: 1.18 CM
ABDOMINAL LT COM ILIAC TRANS: 0.96 CM
ABDOMINAL MID AORTA AP: 1.18 CM
ABDOMINAL MID AORTA TRANS: 1.89 CM
ABDOMINAL PROX AORTA AP: 2.13 CM
ABDOMINAL PROX AORTA TRANS: 1.91 CM
ABDOMINAL RT COM ILIAC AP: 1.07 CM
ABDOMINAL RT COM ILIAC TRANS: 1.1 CM

## 2018-12-26 ENCOUNTER — OFFICE VISIT (OUTPATIENT)
Dept: OBSTETRICS AND GYNECOLOGY | Facility: CLINIC | Age: 73
End: 2018-12-26

## 2018-12-26 VITALS
WEIGHT: 222 LBS | HEIGHT: 67 IN | HEART RATE: 95 BPM | DIASTOLIC BLOOD PRESSURE: 82 MMHG | SYSTOLIC BLOOD PRESSURE: 137 MMHG | BODY MASS INDEX: 34.84 KG/M2

## 2018-12-26 DIAGNOSIS — E66.9 OBESITY (BMI 30.0-34.9): ICD-10-CM

## 2018-12-26 DIAGNOSIS — N39.3 STRESS INCONTINENCE: ICD-10-CM

## 2018-12-26 DIAGNOSIS — N32.81 OVERACTIVE BLADDER: Primary | ICD-10-CM

## 2018-12-26 PROCEDURE — 99204 OFFICE O/P NEW MOD 45 MIN: CPT | Performed by: OBSTETRICS & GYNECOLOGY

## 2018-12-26 RX ORDER — NITROFURANTOIN 25; 75 MG/1; MG/1
100 CAPSULE ORAL 2 TIMES DAILY
Qty: 14 CAPSULE | Refills: 0 | Status: SHIPPED | OUTPATIENT
Start: 2018-12-26 | End: 2019-01-02

## 2018-12-26 RX ORDER — OXYBUTYNIN CHLORIDE 15 MG/1
15 TABLET, EXTENDED RELEASE ORAL DAILY
Qty: 30 TABLET | Refills: 12 | Status: SHIPPED | OUTPATIENT
Start: 2018-12-26 | End: 2019-01-25

## 2018-12-26 NOTE — PROGRESS NOTES
Pita Hyatt is a 73 y.o. y/o female.     Chief Complaint: Overactive bladder with stress urinary incontinence and nocturia.  She was referred by SHAHZAD Dolan.  She desires surgical correction.    HPI:   73 y.o. y/o .  No LMP recorded. Patient has had a hysterectomy.     SHAHZAD Dolan is her primary care provider.     Past medical history significant for hypothyroidism and obesity.    Past surgical history hysterectomy with urinary incontinence procedure in .    She is single and is retired.    She does not smoke or use smokeless tobacco, but she has smoked in the distant past.    No family history of breast cancer, uterine cancer, ovarian cancer, or colon cancer.      She complains of fatigue, weight gain, nausea, and excessive urination.  She also complains of urinary frequency, leaking urine when lifting, leaking urine with coughing or sneezing.    She saw Dr. Franke in urology a couple of years ago.  Dr. Franke recommended pelvic floor physical therapy and tried her on an oral medication.  She states that she did not follow through on either of these recommendations because they had failed in the past.    2018, 222 pounds with BMI 34.8.  On exam she has absolutely no cystocele and no loss of the urethrovesical angle with Valsalva.  She has good support of the vaginal cuff.  She has a second-degree rectocele that is asymptomatic.  I explained to her in detail that bladder neck suspensions and slings would not help without any pelvic relaxation.  She complains of having to get up throughout the night to empty her bladder.  She has urgency as well as stress incontinence.  I have encouraged her to try pelvic floor physical therapy as well as Ditropan XL 15 mg per evening.  We will also try a 7 day course of Macrobid.  I attempted to answer all her questions.  I'll see her back in 3 months.  She is to follow-up sooner as needed.  I spent 45 minutes in direct patient care  with this new patient. 40 minutes, or more than 50% of this encounter, was spent in counseling and coordination of care.     Obstetric History  #: 1, Date: None, SAB    #: 2, Date: None, SAB    #: 3, Date: None, SAB    #: 4, Date: [birth year], Sex: Male, Weight: None, GA: None, Delivery: Vaginal, named Tad.    #: 5, Date: 03/01/65, Sex: Male, Weight: None, GA: None, Delivery: Vaginal, named Tonio.    Review of Systems   Constitutional: Positive for fatigue and unexpected weight change ( Weight gain). Negative for activity change, appetite change, chills, diaphoresis and fever.   Gastrointestinal: Positive for nausea. Negative for abdominal pain, constipation and diarrhea.   Genitourinary: Positive for urgency. Negative for difficulty urinating, dyspareunia, dysuria, menstrual problem, pelvic pain, vaginal bleeding, vaginal discharge and vaginal pain.   Musculoskeletal: Positive for arthralgias, back pain, joint swelling and myalgias.   Neurological: Negative for headaches.   Psychiatric/Behavioral: Negative for dysphoric mood. The patient is not nervous/anxious.    All other systems reviewed and are negative.     Breast ROS: negative    The following portions of the patient's history were reviewed and updated as appropriate: allergies, current medications, past family history, past medical history, past social history, past surgical history and problem list.    Allergies   Allergen Reactions   • Phenergan [Promethazine] Itching        Outpatient Medications Prior to Visit   Medication Sig Dispense Refill   • Cetirizine-Pseudoephedrine (ZYRTEC-D PO) Take 1 tablet by mouth 2 (Two) Times a Day.     • cycloSPORINE (RESTASIS) 0.05 % ophthalmic emulsion Apply 1 drop to eye Every 12 (Twelve) Hours. 5.5 mL 5   • diclofenac (VOLTAREN) 75 MG EC tablet Take 1 tablet by mouth 2 (Two) Times a Day. 60 tablet 3   • fluticasone (FLONASE) 50 MCG/ACT nasal spray 2 sprays into each nostril Daily.     • levothyroxine (SYNTHROID, LEVOTHROID)  100 MCG tablet Take 1 tablet by mouth Daily. 30 tablet 3   • omeprazole (priLOSEC) 20 MG capsule Take 20 mg by mouth Daily.     • ondansetron (ZOFRAN) 4 MG tablet Take 1 tablet by mouth 4 (Four) Times a Day As Needed for Nausea. 20 tablet 0   • thiamine (VITAMIN B-1) 100 MG tablet Take 100 mg by mouth Daily.     • vitamin D (ERGOCALCIFEROL) 59600 units capsule capsule TAKE ONE CAPSULE BY MOUTH ONCE WEEKLY 4 capsule 3     No facility-administered medications prior to visit.         The patient has a family history of   Family History   Problem Relation Age of Onset   • Hypertension Other    • Stroke Other    • Diabetes Other    • Ulcers Other    • Cancer Other    • Stroke Father    • Sinusitis Mother         Past Medical History:   Diagnosis Date   • Acquired hypothyroidism    • Acute bronchitis    • Acute gastritis    • Acute maxillary sinusitis    • Acute maxillary sinusitis, unspecified    • Acute serous otitis media     R   • Acute sinusitis     Could be R mastoid    • Allergic rhinitis    • Anxiety state    • Chronic sinusitis    • Conjunctivitis    • Cough    • Degenerative joint disease of shoulder region    • Depressive disorder     improving   • Diarrhea    • Dysfunction of eustachian tube    • Epigastric pain    • Female stress incontinence    • Forgetfulness    • Functional diarrhea    • Gynecological Examination    • Knee joint replaced by other means    • Malaise and fatigue    • Menopause    • Nausea and vomiting    • Other Chest pain    • Polyp of colon    • Preop examination, unspecified    • Rhinitis medicamentosa    • Shoulder pain    • Skin tag- removal    • Subclinical hypothyroidism    • Vagal reaction         OB History      Para Term  AB Living    5 2 2   3 2    SAB TAB Ectopic Molar Multiple Live Births              2           Social History     Socioeconomic History   • Marital status:      Spouse name: Not on file   • Number of children: Not on file   • Years of  education: Not on file   • Highest education level: Not on file   Social Needs   • Financial resource strain: Not on file   • Food insecurity - worry: Not on file   • Food insecurity - inability: Not on file   • Transportation needs - medical: Not on file   • Transportation needs - non-medical: Not on file   Occupational History   • Not on file   Tobacco Use   • Smoking status: Former Smoker     Last attempt to quit: 1980     Years since quittin.0   • Smokeless tobacco: Never Used   Substance and Sexual Activity   • Alcohol use: Yes     Comment: occasional   • Drug use: No   • Sexual activity: Not on file     Comment: Hysterectomy   Other Topics Concern   • Not on file   Social History Narrative   • Not on file        Past Surgical History:   Procedure Laterality Date   • COLONOSCOPY  2012   • COLONOSCOPY  2017   • ENDOSCOPY AND COLONOSCOPY      Diverticulum in sigmoid colon. 2 polyps in ascending colon & descending colon; removed by snare cautery polypectomy   • HYSTERECTOMY     • INJECTION OF MEDICATION      celestone (betamethasone) 12mg   • INJECTION OF MEDICATION      Depo Medrol 2ml   • INJECTION OF MEDICATION      Rocephin 1gm   • JOINT REPLACEMENT Bilateral 2015    basim total knee replacements   • REPLACEMENT TOTAL KNEE     • SINOSCOPY PROCEDURE Bilateral 2018    Procedure: BILATERAL ENDOSCOPIC EHTMOIDECTOMY, MAXILLARY SINUS ANTROSTOMY, FRONTAL RECESS EXPLORATION, POSSIBLE SEPTOPLASY         BRAINLAB;  Surgeon: Daniel Griffith MD;  Location: [facility];  Service: ENT   • TOTAL ABDOMINAL HYSTERECTOMY WITH SALPINGO OOPHORECTOMY     • UPPER GASTROINTESTINAL ENDOSCOPY  2012   • UPPER GASTROINTESTINAL ENDOSCOPY  2017        Patient Active Problem List   Diagnosis   • Right shoulder pain   • Vitamin D deficiency   • Allergic rhinitis   • Hypothyroidism   • Skin tag   • Stress incontinence   • Acute recurrent maxillary sinusitis   • Dry eyes, bilateral   • Chronic  "maxillary sinusitis   • Nasal septal deformity   • Malaise and fatigue   • Acute recurrent pansinusitis   • Edema extremities   • Overactive bladder   • Obesity (BMI 30.0-34.9)        Documented Vitals    12/26/18 1340   BP: 137/82   Pulse: 95   Weight: 101 kg (222 lb)   Height: 170.2 cm (67\")   PainSc: 0-No pain        Body mass index is 34.77 kg/m².    Physical Exam   Constitutional: She is oriented to person, place, and time. No distress.   Obese white female weighing 222 pounds with BMI 34.8.  Blood pressure 137/82.  Pulse 95.   HENT:   Head: Normocephalic and atraumatic.   Eyes: Conjunctivae and EOM are normal. Pupils are equal, round, and reactive to light.   Neck: Normal range of motion. Neck supple. No JVD present. No tracheal deviation present. No thyromegaly present.   Cardiovascular: Normal rate, regular rhythm, normal heart sounds and intact distal pulses. Exam reveals no gallop and no friction rub.   No murmur heard.  Pulmonary/Chest: Effort normal and breath sounds normal. No stridor. No respiratory distress. She has no wheezes. She has no rales. She exhibits no tenderness.   Abdominal: Soft. Bowel sounds are normal. She exhibits no distension and no mass. There is no tenderness. There is no rebound and no guarding. No hernia.   Genitourinary: Rectal exam shows external hemorrhoid. Rectal exam shows no internal hemorrhoid, no fissure, no mass, no tenderness, anal tone normal and guaiac negative stool. No labial fusion. There is no rash, tenderness, lesion or injury on the right labia. There is no rash, tenderness, lesion or injury on the left labia. No erythema, tenderness or bleeding in the vagina. No foreign body in the vagina. No vaginal discharge found.   Genitourinary Comments: Surgically absent uterus and cervix.  Good support vaginal cuff.  No cystocele.  No loss of urethrovesical angle with Valsalva.  No anterior prolapse at all.  Second-degree rectocele.   Musculoskeletal: Normal range of " motion. She exhibits no edema, tenderness or deformity.   Lymphadenopathy:     She has no cervical adenopathy.   Neurological: She is alert and oriented to person, place, and time. She has normal reflexes. She displays normal reflexes. No cranial nerve deficit. She exhibits normal muscle tone. Coordination normal.   Skin: Skin is warm and dry. No rash noted. She is not diaphoretic. No erythema. No pallor.   Psychiatric: She has a normal mood and affect. Her behavior is normal. Judgment and thought content normal.   Nursing note and vitals reviewed.       Assessment        Diagnosis Plan   1. Overactive bladder     2. Stress incontinence     3. Obesity (BMI 30.0-34.9)           Plan         New Medications Ordered This Visit   Medications   • oxybutynin XL (DITROPAN XL) 15 MG 24 hr tablet     Sig: Take 1 tablet by mouth Daily.     Dispense:  30 tablet     Refill:  12   • nitrofurantoin, macrocrystal-monohydrate, (MACROBID) 100 MG capsule     Sig: Take 1 capsule by mouth 2 (Two) Times a Day for 7 days.     Dispense:  14 capsule     Refill:  0     1. Macrobid twice a day for 1 week.  2. Ditropan XL 15 mg every evening.   3. Referral for pelvic floor physical therapy with Kat Lopez.   4. Encouraged in diet and exercise.  5. Handouts on depression, hot flashes, exercise, and vitamin use.   6. Follow-up in 3 months.  Follow-up sooner as needed.  7. Note to SHAHZAD Dolan.            This document has been electronically signed by Hayden Bajwa MD on December 26, 2018 2:39 PM

## 2019-01-07 DIAGNOSIS — G47.00 INSOMNIA, UNSPECIFIED TYPE: ICD-10-CM

## 2019-01-07 RX ORDER — TRAZODONE HYDROCHLORIDE 50 MG/1
TABLET ORAL
Qty: 90 TABLET | Refills: 2 | Status: SHIPPED | OUTPATIENT
Start: 2019-01-07 | End: 2020-06-19

## 2019-01-07 RX ORDER — LEVOTHYROXINE SODIUM 0.1 MG/1
TABLET ORAL
Qty: 90 TABLET | Refills: 2 | OUTPATIENT
Start: 2019-01-07 | End: 2019-08-06 | Stop reason: SDUPTHER

## 2019-01-25 ENCOUNTER — OFFICE VISIT (OUTPATIENT)
Dept: FAMILY MEDICINE CLINIC | Facility: CLINIC | Age: 74
End: 2019-01-25

## 2019-01-25 VITALS
HEART RATE: 87 BPM | RESPIRATION RATE: 20 BRPM | OXYGEN SATURATION: 100 % | SYSTOLIC BLOOD PRESSURE: 130 MMHG | DIASTOLIC BLOOD PRESSURE: 73 MMHG | TEMPERATURE: 97.9 F | WEIGHT: 226 LBS | BODY MASS INDEX: 35.47 KG/M2 | HEIGHT: 67 IN

## 2019-01-25 DIAGNOSIS — J32.9 CHRONIC SINUSITIS, UNSPECIFIED LOCATION: Primary | ICD-10-CM

## 2019-01-25 DIAGNOSIS — E55.9 VITAMIN D DEFICIENCY: ICD-10-CM

## 2019-01-25 DIAGNOSIS — N39.3 STRESS INCONTINENCE: ICD-10-CM

## 2019-01-25 DIAGNOSIS — M25.511 CHRONIC RIGHT SHOULDER PAIN: ICD-10-CM

## 2019-01-25 DIAGNOSIS — R11.0 NAUSEA: ICD-10-CM

## 2019-01-25 DIAGNOSIS — G89.29 CHRONIC RIGHT SHOULDER PAIN: ICD-10-CM

## 2019-01-25 PROCEDURE — 99214 OFFICE O/P EST MOD 30 MIN: CPT | Performed by: NURSE PRACTITIONER

## 2019-01-25 RX ORDER — ONDANSETRON 4 MG/1
4 TABLET, FILM COATED ORAL 4 TIMES DAILY PRN
Qty: 20 TABLET | Refills: 0 | Status: SHIPPED | OUTPATIENT
Start: 2019-01-25 | End: 2020-06-19 | Stop reason: SDUPTHER

## 2019-01-25 RX ORDER — ERGOCALCIFEROL 1.25 MG/1
50000 CAPSULE ORAL WEEKLY
Qty: 4 CAPSULE | Refills: 3 | OUTPATIENT
Start: 2019-01-25 | End: 2019-01-29 | Stop reason: SDUPTHER

## 2019-01-25 NOTE — PROGRESS NOTES
Subjective   Pita Hyatt is a 73 y.o. female.     Here today for josephine.  She needs a new appointment with ent due to chronic sinusitis.  She has had sinus surgery and it did not help.      She is having difficulty with rom of her right shoulder.  Has been going on for several months.  She reports no injury.  She cannot lift her right arm above her head and she has limited ability to rotate her right shoulder.      Sinusitis   This is a chronic problem. The current episode started more than 1 year ago. The problem has been waxing and waning since onset. There has been no fever. Her pain is at a severity of 3/10. The pain is mild. Associated symptoms include congestion, headaches and sinus pressure. Pertinent negatives include no chills, coughing, diaphoresis, ear pain, hoarse voice, neck pain, shortness of breath, sneezing, sore throat or swollen glands. Past treatments include lying down, antibiotics and sitting up (sinus surgery). The treatment provided no relief.   Upper Extremity Issue   This is a new problem. The current episode started more than 1 month ago. The problem occurs constantly. The problem has been gradually worsening. Associated symptoms include arthralgias, congestion and headaches. Pertinent negatives include no abdominal pain, anorexia, change in bowel habit, chest pain, chills, coughing, diaphoresis, fatigue, fever, joint swelling, myalgias, nausea, neck pain, numbness, rash, sore throat, swollen glands, urinary symptoms, vertigo, visual change, vomiting or weakness. The symptoms are aggravated by exertion (rom). She has tried nothing for the symptoms. The treatment provided no relief.        The following portions of the patient's history were reviewed and updated as appropriate: allergies, current medications, past family history, past medical history, past social history, past surgical history and problem list.    Review of Systems   Constitutional: Negative.  Negative for chills,  diaphoresis, fatigue and fever.   HENT: Positive for congestion and sinus pressure. Negative for ear pain, hoarse voice, sneezing and sore throat.    Respiratory: Negative.  Negative for cough and shortness of breath.    Cardiovascular: Negative.  Negative for chest pain.   Gastrointestinal: Negative for abdominal pain, anorexia, change in bowel habit, nausea and vomiting.   Musculoskeletal: Positive for arthralgias. Negative for joint swelling, myalgias and neck pain.   Skin: Negative.  Negative for rash.   Neurological: Negative for vertigo, weakness and numbness.   Psychiatric/Behavioral: Negative.        Objective   Physical Exam   Constitutional: She is oriented to person, place, and time. She appears well-developed and well-nourished.   HENT:   Head: Normocephalic and atraumatic.   Right Ear: External ear normal.   Left Ear: External ear normal.   Nose: Nose normal.   Mouth/Throat: Oropharynx is clear and moist. No oropharyngeal exudate.   Eyes: Pupils are equal, round, and reactive to light.   Neck: Normal range of motion. Neck supple. No thyromegaly present.   Cardiovascular: Normal rate, regular rhythm and normal heart sounds. Exam reveals no friction rub.   No murmur heard.  Pulmonary/Chest: Effort normal and breath sounds normal. No stridor. No respiratory distress. She has no wheezes. She has no rales.   Abdominal: Soft.   Musculoskeletal:        Right shoulder: She exhibits decreased range of motion and tenderness.   Has poor ability to raise above her head and she cannot rotate.  Xray of her right shoulder is reviewed and shows minimal degenerative cystic changes of the right humeral head.   Neurological: She is alert and oriented to person, place, and time.   Skin: Skin is warm and dry.   Psychiatric: She has a normal mood and affect. Thought content normal.   Nursing note and vitals reviewed.        Assessment/Plan   [place] was seen today for hypothyroidism and edema.    Diagnoses and all orders for  this visit:    Chronic sinusitis, unspecified location  -     Ambulatory Referral to ENT (Otolaryngology)    Chronic right shoulder pain  -     XR Shoulder 2+ View Right (In Office)  -     Ambulatory Referral to Orthopedic Surgery    Stress incontinence  -     Mirabegron ER (MYRBETRIQ) 50 MG tablet sustained-release 24 hour 24 hr tablet; Take 50 mg by mouth Daily.    Nausea  -     ondansetron (ZOFRAN) 4 MG tablet; Take 1 tablet by mouth 4 (Four) Times a Day As Needed for Nausea.    Vitamin D deficiency  -     vitamin D (ERGOCALCIFEROL) 11438 units capsule capsule; Take 1 capsule by mouth 1 (One) Time Per Week.

## 2019-01-29 DIAGNOSIS — E55.9 VITAMIN D DEFICIENCY: ICD-10-CM

## 2019-01-29 DIAGNOSIS — M54.6 THORACIC BACK PAIN, UNSPECIFIED BACK PAIN LATERALITY, UNSPECIFIED CHRONICITY: ICD-10-CM

## 2019-01-30 ENCOUNTER — TELEPHONE (OUTPATIENT)
Dept: FAMILY MEDICINE CLINIC | Facility: CLINIC | Age: 74
End: 2019-01-30

## 2019-02-04 RX ORDER — ERGOCALCIFEROL 1.25 MG/1
50000 CAPSULE ORAL WEEKLY
Qty: 4 CAPSULE | Refills: 3 | OUTPATIENT
Start: 2019-02-04 | End: 2019-03-26 | Stop reason: SDUPTHER

## 2019-02-04 RX ORDER — TRAMADOL HYDROCHLORIDE 50 MG/1
50 TABLET ORAL EVERY 8 HOURS PRN
Qty: 90 TABLET | Refills: 0 | OUTPATIENT
Start: 2019-02-04 | End: 2019-07-16 | Stop reason: SDUPTHER

## 2019-02-26 ENCOUNTER — OFFICE VISIT (OUTPATIENT)
Dept: ORTHOPEDIC SURGERY | Facility: CLINIC | Age: 74
End: 2019-02-26

## 2019-02-26 VITALS — BODY MASS INDEX: 35.47 KG/M2 | HEIGHT: 67 IN | WEIGHT: 226 LBS

## 2019-02-26 DIAGNOSIS — G89.29 CHRONIC RIGHT SHOULDER PAIN: ICD-10-CM

## 2019-02-26 DIAGNOSIS — M75.41 IMPINGEMENT SYNDROME OF RIGHT SHOULDER: Primary | ICD-10-CM

## 2019-02-26 DIAGNOSIS — M25.511 CHRONIC RIGHT SHOULDER PAIN: ICD-10-CM

## 2019-02-26 PROCEDURE — 99213 OFFICE O/P EST LOW 20 MIN: CPT | Performed by: NURSE PRACTITIONER

## 2019-02-26 NOTE — PROGRESS NOTES
Pita Hyatt is a 73 y.o. female   Primary provider:  Rose Marie Kelley APRN       Chief Complaint   Patient presents with   • Right Shoulder - Shoulder Pain       HISTORY OF PRESENT ILLNESS: patient referred by DENISE Kelley for right shoulder pain that started about 6 months ago, no injury. xrays done on 1/25/2019. Patient states most of pain is when she is lifting something, takes ibuprofen and tramadol as needed. Physical therapy at [facility]    The incident occurred more than 1 week ago. There was no injury mechanism. The pain is present in the right shoulder. The quality of the pain is described as aching and stabbing. The pain does not radiate. The pain is moderate. The pain has been intermittent since the incident. Exacerbated by: driving.  She has tried rest and NSAIDs for the symptoms.        CONCURRENT MEDICAL HISTORY:    Past Medical History:   Diagnosis Date   • Acquired hypothyroidism    • Acute bronchitis    • Acute gastritis    • Acute maxillary sinusitis    • Acute maxillary sinusitis, unspecified    • Acute serous otitis media     R   • Acute sinusitis     Could be R mastoid    • Allergic rhinitis    • Anxiety state    • Chronic sinusitis    • Conjunctivitis    • Cough    • Degenerative joint disease of shoulder region    • Depressive disorder     improving   • Diarrhea    • Dysfunction of eustachian tube    • Epigastric pain    • Female stress incontinence    • Forgetfulness    • Functional diarrhea    • Gynecological Examination    • Knee joint replaced by other means    • Malaise and fatigue    • Menopause    • Nausea and vomiting    • Other Chest pain    • Polyp of colon    • Preop examination, unspecified    • Rhinitis medicamentosa    • Shoulder pain    • Skin tag- removal    • Subclinical hypothyroidism    • Vagal reaction        Allergies   Allergen Reactions   • Phenergan [Promethazine] Itching         Current Outpatient Medications:   •  Cetirizine-Pseudoephedrine  (ZYRTEC-D PO), Take 1 tablet by mouth 2 (Two) Times a Day., Disp: , Rfl:   •  cycloSPORINE (RESTASIS) 0.05 % ophthalmic emulsion, Apply 1 drop to eye Every 12 (Twelve) Hours., Disp: 5.5 mL, Rfl: 5  •  fluticasone (FLONASE) 50 MCG/ACT nasal spray, 2 sprays into each nostril Daily., Disp: , Rfl:   •  levothyroxine (SYNTHROID, LEVOTHROID) 100 MCG tablet, TAKE ONE TABLET BY MOUTH DAILY, Disp: 90 tablet, Rfl: 2  •  Mirabegron ER (MYRBETRIQ) 50 MG tablet sustained-release 24 hour 24 hr tablet, Take 50 mg by mouth Daily., Disp: 30 tablet, Rfl: 5  •  omeprazole (priLOSEC) 20 MG capsule, Take 20 mg by mouth Daily., Disp: , Rfl:   •  ondansetron (ZOFRAN) 4 MG tablet, Take 1 tablet by mouth 4 (Four) Times a Day As Needed for Nausea., Disp: 20 tablet, Rfl: 0  •  thiamine (VITAMIN B-1) 100 MG tablet, Take 100 mg by mouth Daily., Disp: , Rfl:   •  traMADol (ULTRAM) 50 MG tablet, Take 1 tablet by mouth Every 8 (Eight) Hours As Needed for Moderate Pain ., Disp: 90 tablet, Rfl: 0  •  traZODone (DESYREL) 50 MG tablet, TAKE ONE TABLET BY MOUTH EVERY NIGHT, Disp: 90 tablet, Rfl: 2  •  vitamin D (ERGOCALCIFEROL) 45226 units capsule capsule, Take 1 capsule by mouth 1 (One) Time Per Week., Disp: 4 capsule, Rfl: 3    Past Surgical History:   Procedure Laterality Date   • COLONOSCOPY  07/16/2012   • COLONOSCOPY  01/13/2017   • ENDOSCOPY AND COLONOSCOPY      Diverticulum in sigmoid colon. 2 polyps in ascending colon & descending colon; removed by snare cautery polypectomy   • HYSTERECTOMY     • INJECTION OF MEDICATION      celestone (betamethasone) 12mg   • INJECTION OF MEDICATION      Depo Medrol 2ml   • INJECTION OF MEDICATION      Rocephin 1gm   • JOINT REPLACEMENT Bilateral 2015 2013    basim total knee replacements   • REPLACEMENT TOTAL KNEE     • SINOSCOPY PROCEDURE Bilateral 4/16/2018    Procedure: BILATERAL ENDOSCOPIC EHTMOIDECTOMY, MAXILLARY SINUS ANTROSTOMY, FRONTAL RECESS EXPLORATION, POSSIBLE SEPTOPLASY         BRAINLAB;  Surgeon:  "Daniel Griffith MD;  Location: [facility];  Service: ENT   • TOTAL ABDOMINAL HYSTERECTOMY WITH SALPINGO OOPHORECTOMY     • UPPER GASTROINTESTINAL ENDOSCOPY  2012   • UPPER GASTROINTESTINAL ENDOSCOPY  2017       Family History   Problem Relation Age of Onset   • Hypertension Other    • Stroke Other    • Diabetes Other    • Ulcers Other    • Cancer Other    • Stroke Father    • Sinusitis Mother         Social History     Socioeconomic History   • Marital status:      Spouse name: Not on file   • Number of children: Not on file   • Years of education: Not on file   • Highest education level: Not on file   Social Needs   • Financial resource strain: Not on file   • Food insecurity - worry: Not on file   • Food insecurity - inability: Not on file   • Transportation needs - medical: Not on file   • Transportation needs - non-medical: Not on file   Occupational History   • Not on file   Tobacco Use   • Smoking status: Former Smoker     Last attempt to quit: 1980     Years since quittin.1   • Smokeless tobacco: Never Used   Substance and Sexual Activity   • Alcohol use: Yes     Comment: occasional   • Drug use: No   • Sexual activity: Not on file     Comment: Hysterectomy   Other Topics Concern   • Not on file   Social History Narrative   • Not on file        Review of Systems   Eyes: Positive for visual disturbance.        Dry eyes   Musculoskeletal: Positive for joint swelling.   Psychiatric/Behavioral: Positive for sleep disturbance. The patient is nervous/anxious.    All other systems reviewed and are negative.      PHYSICAL EXAMINATION:       Ht 170.2 cm (67\")   Wt 103 kg (226 lb)   BMI 35.40 kg/m²     Physical Exam   Constitutional: She is oriented to person, place, and time. Vital signs are normal. She appears well-developed and well-nourished. She is cooperative.   HENT:   Head: Normocephalic and atraumatic.   Neck: Trachea normal and phonation normal.   Pulmonary/Chest: Effort " normal. No respiratory distress.   Abdominal: Soft. Normal appearance. She exhibits no distension.   Neurological: She is alert and oriented to person, place, and time. GCS eye subscore is 4. GCS verbal subscore is 5. GCS motor subscore is 6.   Skin: Skin is warm, dry and intact. Capillary refill takes less than 2 seconds.   Psychiatric: She has a normal mood and affect. Her speech is normal and behavior is normal. Judgment and thought content normal. Cognition and memory are normal.   Vitals reviewed.      GAIT:     [x]  Normal  []  Antalgic    Assistive device: [x]  None  []  Walker     []  Crutches  []  Cane     []  Wheelchair  []  Stretcher    Right Shoulder Exam     Tenderness   The patient is experiencing tenderness in the acromioclavicular joint.    Range of Motion   Active abduction: abnormal   External rotation: normal   Forward flexion: normal   Internal rotation 0 degrees: L2     Muscle Strength   Abduction: 4/5   Internal rotation: 4/5   External rotation: 4/5   Supraspinatus: 4/5     Tests   Impingement: positive  Drop arm: negative    Other   Erythema: absent  Scars: absent  Sensation: normal  Pulse: present      Left Shoulder Exam   Left shoulder exam is normal.                Three view right shoulder     HISTORY: Right shoulder pain     AP films with the humerus in internal and external rotation and  scapular Y view were obtained.     COMPARISON: None     Minimal degenerative cystic change superior lateral aspect of the  humeral head.  No fracture or dislocation.  No other osseous or articular abnormality.     IMPRESSION:  CONCLUSION:  Minimal degenerative cystic change superior lateral aspect of the  humeral head.     [id number]     Electronically signed by:  Kb De Anda MD  1/25/2019 3:10 PM CST  Workstation: [id number]      ASSESSMENT:    Diagnoses and all orders for this visit:    Impingement syndrome of right shoulder  -     Cancel: Ambulatory Referral to Physical Therapy Evaluate and treat  -      Ambulatory Referral to Physical Therapy    Chronic right shoulder pain  -     Cancel: Ambulatory Referral to Physical Therapy Evaluate and treat  -     Ambulatory Referral to Physical Therapy          PLAN  I reviewed the x-rays with the patient discussing treatment options for impingement syndrome in detail.  I recommended course of physical therapy, continued use of an anti-inflammatory and follow-up in 4-6 weeks for recheck.  If her pain continues then we will proceed with an MRI of the right shoulder.  No Follow-up on file.    Warren Bernal, APRN

## 2019-03-26 ENCOUNTER — OFFICE VISIT (OUTPATIENT)
Dept: FAMILY MEDICINE CLINIC | Facility: CLINIC | Age: 74
End: 2019-03-26

## 2019-03-26 ENCOUNTER — TELEPHONE (OUTPATIENT)
Dept: FAMILY MEDICINE CLINIC | Facility: CLINIC | Age: 74
End: 2019-03-26

## 2019-03-26 VITALS
HEART RATE: 84 BPM | SYSTOLIC BLOOD PRESSURE: 134 MMHG | TEMPERATURE: 98.6 F | BODY MASS INDEX: 35.91 KG/M2 | WEIGHT: 228.8 LBS | OXYGEN SATURATION: 99 % | HEIGHT: 67 IN | DIASTOLIC BLOOD PRESSURE: 66 MMHG

## 2019-03-26 DIAGNOSIS — R07.9 CHEST PAIN, UNSPECIFIED TYPE: Primary | ICD-10-CM

## 2019-03-26 DIAGNOSIS — E55.9 VITAMIN D DEFICIENCY: ICD-10-CM

## 2019-03-26 DIAGNOSIS — N39.3 STRESS INCONTINENCE: ICD-10-CM

## 2019-03-26 PROCEDURE — 93005 ELECTROCARDIOGRAM TRACING: CPT | Performed by: NURSE PRACTITIONER

## 2019-03-26 PROCEDURE — 93010 ELECTROCARDIOGRAM REPORT: CPT | Performed by: INTERNAL MEDICINE

## 2019-03-26 PROCEDURE — 99214 OFFICE O/P EST MOD 30 MIN: CPT | Performed by: NURSE PRACTITIONER

## 2019-03-26 RX ORDER — ERGOCALCIFEROL 1.25 MG/1
50000 CAPSULE ORAL WEEKLY
Qty: 4 CAPSULE | Refills: 3 | OUTPATIENT
Start: 2019-03-26 | End: 2019-08-05 | Stop reason: SDUPTHER

## 2019-03-26 NOTE — TELEPHONE ENCOUNTER
Patient left voice message asking for an appointment.  Called patient and she stated that she has been having chest pains. I told patient that she needed to go to the nearest emergency room but she refused and stated that she wanted an appointment with her provider. Appointment was made.

## 2019-03-26 NOTE — TELEPHONE ENCOUNTER
Tried to call her back at 1:30 to find out more about the chest pain.  There was no answer.  Left her a voice mail to call back and let me know more about the chest pain or to go to the emergency room if the pain was severe.  Advised not to wait for appointment if she felt this was cardiac in nature.

## 2019-03-28 NOTE — PATIENT INSTRUCTIONS
Calorie Counting for Weight Loss  Calories are units of energy. Your body needs a certain amount of calories from food to keep you going throughout the day. When you eat more calories than your body needs, your body stores the extra calories as fat. When you eat fewer calories than your body needs, your body burns fat to get the energy it needs.  Calorie counting means keeping track of how many calories you eat and drink each day. Calorie counting can be helpful if you need to lose weight. If you make sure to eat fewer calories than your body needs, you should lose weight. Ask your health care provider what a healthy weight is for you.  For calorie counting to work, you will need to eat the right number of calories in a day in order to lose a healthy amount of weight per week. A dietitian can help you determine how many calories you need in a day and will give you suggestions on how to reach your calorie goal.  · A healthy amount of weight to lose per week is usually 1-2 lb (0.5-0.9 kg). This usually means that your daily calorie intake should be reduced by 500-750 calories.  · Eating 1,200 - 1,500 calories per day can help most women lose weight.  · Eating 1,500 - 1,800 calories per day can help most men lose weight.    What is my plan?  My goal is to have __________ calories per day.  If I have this many calories per day, I should lose around __________ pounds per week.  What do I need to know about calorie counting?  In order to meet your daily calorie goal, you will need to:  · Find out how many calories are in each food you would like to eat. Try to do this before you eat.  · Decide how much of the food you plan to eat.  · Write down what you ate and how many calories it had. Doing this is called keeping a food log.    To successfully lose weight, it is important to balance calorie counting with a healthy lifestyle that includes regular activity. Aim for 150 minutes of moderate exercise (such as walking) or 75  minutes of vigorous exercise (such as running) each week.  Where do I find calorie information?    The number of calories in a food can be found on a Nutrition Facts label. If a food does not have a Nutrition Facts label, try to look up the calories online or ask your dietitian for help.  Remember that calories are listed per serving. If you choose to have more than one serving of a food, you will have to multiply the calories per serving by the amount of servings you plan to eat. For example, the label on a package of bread might say that a serving size is 1 slice and that there are 90 calories in a serving. If you eat 1 slice, you will have eaten 90 calories. If you eat 2 slices, you will have eaten 180 calories.  How do I keep a food log?  Immediately after each meal, record the following information in your food log:  · What you ate. Don't forget to include toppings, sauces, and other extras on the food.  · How much you ate. This can be measured in cups, ounces, or number of items.  · How many calories each food and drink had.  · The total number of calories in the meal.    Keep your food log near you, such as in a small notebook in your pocket, or use a mobile miguel or website. Some programs will calculate calories for you and show you how many calories you have left for the day to meet your goal.  What are some calorie counting tips?  · Use your calories on foods and drinks that will fill you up and not leave you hungry:  ? Some examples of foods that fill you up are nuts and nut butters, vegetables, lean proteins, and high-fiber foods like whole grains. High-fiber foods are foods with more than 5 g fiber per serving.  ? Drinks such as sodas, specialty coffee drinks, alcohol, and juices have a lot of calories, yet do not fill you up.  · Eat nutritious foods and avoid empty calories. Empty calories are calories you get from foods or beverages that do not have many vitamins or protein, such as candy, sweets, and  "soda. It is better to have a nutritious high-calorie food (such as an avocado) than a food with few nutrients (such as a bag of chips).  · Know how many calories are in the foods you eat most often. This will help you calculate calorie counts faster.  · Pay attention to calories in drinks. Low-calorie drinks include water and unsweetened drinks.  · Pay attention to nutrition labels for \"low fat\" or \"fat free\" foods. These foods sometimes have the same amount of calories or more calories than the full fat versions. They also often have added sugar, starch, or salt, to make up for flavor that was removed with the fat.  · Find a way of tracking calories that works for you. Get creative. Try different apps or programs if writing down calories does not work for you.  What are some portion control tips?  · Know how many calories are in a serving. This will help you know how many servings of a certain food you can have.  · Use a measuring cup to measure serving sizes. You could also try weighing out portions on a kitchen scale. With time, you will be able to estimate serving sizes for some foods.  · Take some time to put servings of different foods on your favorite plates, bowls, and cups so you know what a serving looks like.  · Try not to eat straight from a bag or box. Doing this can lead to overeating. Put the amount you would like to eat in a cup or on a plate to make sure you are eating the right portion.  · Use smaller plates, glasses, and bowls to prevent overeating.  · Try not to multitask (for example, watch TV or use your computer) while eating. If it is time to eat, sit down at a table and enjoy your food. This will help you to know when you are full. It will also help you to be aware of what you are eating and how much you are eating.  What are tips for following this plan?  Reading food labels  · Check the calorie count compared to the serving size. The serving size may be smaller than what you are used to " eating.  · Check the source of the calories. Make sure the food you are eating is high in vitamins and protein and low in saturated and trans fats.  Shopping  · Read nutrition labels while you shop. This will help you make healthy decisions before you decide to purchase your food.  · Make a grocery list and stick to it.  Cooking  · Try to cook your favorite foods in a healthier way. For example, try baking instead of frying.  · Use low-fat dairy products.  Meal planning  · Use more fruits and vegetables. Half of your plate should be fruits and vegetables.  · Include lean proteins like poultry and fish.  How do I count calories when eating out?  · Ask for smaller portion sizes.  · Consider sharing an entree and sides instead of getting your own entree.  · If you get your own entree, eat only half. Ask for a box at the beginning of your meal and put the rest of your entree in it so you are not tempted to eat it.  · If calories are listed on the menu, choose the lower calorie options.  · Choose dishes that include vegetables, fruits, whole grains, low-fat dairy products, and lean protein.  · Choose items that are boiled, broiled, grilled, or steamed. Stay away from items that are buttered, battered, fried, or served with cream sauce. Items labeled “crispy” are usually fried, unless stated otherwise.  · Choose water, low-fat milk, unsweetened iced tea, or other drinks without added sugar. If you want an alcoholic beverage, choose a lower calorie option such as a glass of wine or light beer.  · Ask for dressings, sauces, and syrups on the side. These are usually high in calories, so you should limit the amount you eat.  · If you want a salad, choose a garden salad and ask for grilled meats. Avoid extra toppings like gregory, cheese, or fried items. Ask for the dressing on the side, or ask for olive oil and vinegar or lemon to use as dressing.  · Estimate how many servings of a food you are given. For example, a serving of  cooked rice is ½ cup or about the size of half a baseball. Knowing serving sizes will help you be aware of how much food you are eating at restaurants. The list below tells you how big or small some common portion sizes are based on everyday objects:  ? 1 oz--4 stacked dice.  ? 3 oz--1 deck of cards.  ? 1 tsp--1 die.  ? 1 Tbsp--½ a ping-pong ball.  ? 2 Tbsp--1 ping-pong ball.  ? ½ cup--½ baseball.  ? 1 cup--1 baseball.  Summary  · Calorie counting means keeping track of how many calories you eat and drink each day. If you eat fewer calories than your body needs, you should lose weight.  · A healthy amount of weight to lose per week is usually 1-2 lb (0.5-0.9 kg). This usually means reducing your daily calorie intake by 500-750 calories.  · The number of calories in a food can be found on a Nutrition Facts label. If a food does not have a Nutrition Facts label, try to look up the calories online or ask your dietitian for help.  · Use your calories on foods and drinks that will fill you up, and not on foods and drinks that will leave you hungry.  · Use smaller plates, glasses, and bowls to prevent overeating.  This information is not intended to replace advice given to you by your health care provider. Make sure you discuss any questions you have with your health care provider.  Document Released: 12/18/2006 Document Revised: 11/17/2017 Document Reviewed: 11/17/2017  [employer] Interactive Patient Education © 2019 [employer]      Exercising to Lose Weight  Exercising can help you to lose weight. In order to lose weight through exercise, you need to do vigorous-intensity exercise. You can tell that you are exercising with vigorous intensity if you are breathing very hard and fast and cannot hold a conversation while exercising.  Moderate-intensity exercise helps to maintain your current weight. You can tell that you are exercising at a moderate level if you have a higher heart rate and faster breathing, but you are  still able to hold a conversation.  How often should I exercise?  Choose an activity that you enjoy and set realistic goals. Your health care provider can help you to make an activity plan that works for you. Exercise regularly as directed by your health care provider. This may include:  · Doing resistance training twice each week, such as:  ? Push-ups.  ? Sit-ups.  ? Lifting weights.  ? Using resistance bands.  · Doing a given intensity of exercise for a given amount of time. Choose from these options:  ? 150 minutes of moderate-intensity exercise every week.  ? 75 minutes of vigorous-intensity exercise every week.  ? A mix of moderate-intensity and vigorous-intensity exercise every week.    Children, pregnant women, people who are out of shape, people who are overweight, and older adults may need to consult a health care provider for individual recommendations. If you have any sort of medical condition, be sure to consult your health care provider before starting a new exercise program.  What are some activities that can help me to lose weight?  · Walking at a rate of at least 4.5 miles an hour.  · Jogging or running at a rate of 5 miles per hour.  · Biking at a rate of at least 10 miles per hour.  · Lap swimming.  · Roller-skating or in-line skating.  · Cross-country skiing.  · Vigorous competitive sports, such as football, basketball, and soccer.  · Jumping rope.  · Aerobic dancing.  How can I be more active in my day-to-day activities?  · Use the stairs instead of the elevator.  · Take a walk during your lunch break.  · If you drive, park your car farther away from work or school.  · If you take public transportation, get off one stop early and walk the rest of the way.  · Make all of your phone calls while standing up and walking around.  · Get up, stretch, and walk around every 30 minutes throughout the day.  What guidelines should I follow while exercising?  · Do not exercise so much that you hurt yourself,  feel dizzy, or get very short of breath.  · Consult your health care provider prior to starting a new exercise program.  · Wear comfortable clothes and shoes with good support.  · Drink plenty of water while you exercise to prevent dehydration or heat stroke. Body water is lost during exercise and must be replaced.  · Work out until you breathe faster and your heart beats faster.  This information is not intended to replace advice given to you by your health care provider. Make sure you discuss any questions you have with your health care provider.  Document Released: 01/20/2012 Document Revised: 05/25/2017 Document Reviewed: 05/21/2015  [employer] Interactive Patient Education © 2019 [employer]      Exercising to Lose Weight  Exercising can help you to lose weight. In order to lose weight through exercise, you need to do vigorous-intensity exercise. You can tell that you are exercising with vigorous intensity if you are breathing very hard and fast and cannot hold a conversation while exercising.  Moderate-intensity exercise helps to maintain your current weight. You can tell that you are exercising at a moderate level if you have a higher heart rate and faster breathing, but you are still able to hold a conversation.  How often should I exercise?  Choose an activity that you enjoy and set realistic goals. Your health care provider can help you to make an activity plan that works for you. Exercise regularly as directed by your health care provider. This may include:  · Doing resistance training twice each week, such as:  ? Push-ups.  ? Sit-ups.  ? Lifting weights.  ? Using resistance bands.  · Doing a given intensity of exercise for a given amount of time. Choose from these options:  ? 150 minutes of moderate-intensity exercise every week.  ? 75 minutes of vigorous-intensity exercise every week.  ? A mix of moderate-intensity and vigorous-intensity exercise every week.    Children, pregnant women, people who are  out of shape, people who are overweight, and older adults may need to consult a health care provider for individual recommendations. If you have any sort of medical condition, be sure to consult your health care provider before starting a new exercise program.  What are some activities that can help me to lose weight?  · Walking at a rate of at least 4.5 miles an hour.  · Jogging or running at a rate of 5 miles per hour.  · Biking at a rate of at least 10 miles per hour.  · Lap swimming.  · Roller-skating or in-line skating.  · Cross-country skiing.  · Vigorous competitive sports, such as football, basketball, and soccer.  · Jumping rope.  · Aerobic dancing.  How can I be more active in my day-to-day activities?  · Use the stairs instead of the elevator.  · Take a walk during your lunch break.  · If you drive, park your car farther away from work or school.  · If you take public transportation, get off one stop early and walk the rest of the way.  · Make all of your phone calls while standing up and walking around.  · Get up, stretch, and walk around every 30 minutes throughout the day.  What guidelines should I follow while exercising?  · Do not exercise so much that you hurt yourself, feel dizzy, or get very short of breath.  · Consult your health care provider prior to starting a new exercise program.  · Wear comfortable clothes and shoes with good support.  · Drink plenty of water while you exercise to prevent dehydration or heat stroke. Body water is lost during exercise and must be replaced.  · Work out until you breathe faster and your heart beats faster.  This information is not intended to replace advice given to you by your health care provider. Make sure you discuss any questions you have with your health care provider.  Document Released: 01/20/2012 Document Revised: 05/25/2017 Document Reviewed: 05/21/2015  [employer] Interactive Patient Education © 2019 [employer]

## 2019-03-28 NOTE — PROGRESS NOTES
Subjective   Pita Hyatt is a 73 y.o. female.     Mrs blanca called stating that she was having chest pain.  She wanted to come in to be seen.  On further questioning she had the pain last night with it starting in her left shoulder and radiating into her right upper chest.  She took a baby aspirin and drank some water when it happened.  She says it helped and has had no further episodes of pain.      Chest Pain    This is a new problem. The current episode started yesterday. The onset quality is undetermined. The problem occurs rarely. The problem has been resolved. The pain is at a severity of 7/10. The pain is severe. The quality of the pain is described as dull and burning. Pertinent negatives include no abdominal pain, back pain, claudication, cough, diaphoresis, dizziness, exertional chest pressure, fever, headaches, hemoptysis, irregular heartbeat, leg pain, lower extremity edema, malaise/fatigue, nausea, near-syncope, numbness, orthopnea, palpitations, PND, shortness of breath, sputum production, syncope, vomiting or weakness. The pain is aggravated by nothing. Treatments tried: asa. The treatment provided significant relief. Risk factors include being elderly, obesity, lack of exercise, post-menopausal, sedentary lifestyle and stress. Prior diagnostic workup includes echocardiogram.        The following portions of the patient's history were reviewed and updated as appropriate: allergies, current medications, past family history, past medical history, past social history, past surgical history and problem list.    Review of Systems   Constitutional: Negative.  Negative for diaphoresis, fever and malaise/fatigue.   HENT: Negative.    Eyes: Negative.    Respiratory: Negative.  Negative for cough, hemoptysis, sputum production and shortness of breath.    Cardiovascular: Positive for chest pain. Negative for palpitations, orthopnea, claudication, syncope, PND and near-syncope.   Gastrointestinal: Negative.   Negative for abdominal pain, nausea and vomiting.   Endocrine: Negative.    Genitourinary: Negative.    Musculoskeletal: Negative.  Negative for back pain.   Skin: Negative.    Allergic/Immunologic: Negative.    Neurological: Negative.  Negative for dizziness, weakness and numbness.   Hematological: Negative.    Psychiatric/Behavioral: Negative.        Objective   Physical Exam   Constitutional: She is oriented to person, place, and time. She appears well-developed and well-nourished. No distress.   HENT:   Head: Normocephalic and atraumatic.   Right Ear: External ear normal.   Left Ear: External ear normal.   Nose: Nose normal.   Mouth/Throat: Oropharynx is clear and moist. No oropharyngeal exudate.   Eyes: Pupils are equal, round, and reactive to light.   Neck: Normal range of motion. Neck supple. No thyromegaly present.   Cardiovascular: Normal rate, regular rhythm and normal heart sounds. Exam reveals no friction rub.   No murmur heard.  ekg shows normal sinus rhythm,  Moderate voltage critera for LVH, may be a normal varient.  Vent rate 84bpm.   Pulmonary/Chest: Effort normal and breath sounds normal. No respiratory distress. She has no wheezes. She has no rales.   Abdominal: Soft.   Musculoskeletal: Normal range of motion.   Neurological: She is alert and oriented to person, place, and time.   Skin: Skin is warm and dry.   Psychiatric: She has a normal mood and affect. Thought content normal.   Nursing note and vitals reviewed.        Assessment/Plan   [place] was seen today for chest pain.    Diagnoses and all orders for this visit:    Chest pain, unspecified type  -     ECG 12 Lead    Stress incontinence  -     Mirabegron ER (MYRBETRIQ) 50 MG tablet sustained-release 24 hour 24 hr tablet; Take 50 mg by mouth Daily.    Vitamin D deficiency  -     vitamin D (ERGOCALCIFEROL) 53620 units capsule capsule; Take 1 capsule by mouth 1 (One) Time Per Week.      rtc if she has more episodes of chest pain.  She is  given refills on her maintenance meds.

## 2019-04-15 RX ORDER — CETIRIZINE HYDROCHLORIDE, PSEUDOEPHEDRINE HYDROCHLORIDE 5; 120 MG/1; MG/1
TABLET, FILM COATED, EXTENDED RELEASE ORAL
Qty: 60 TABLET | Refills: 2 | Status: SHIPPED | OUTPATIENT
Start: 2019-04-15 | End: 2020-06-19

## 2019-05-08 ENCOUNTER — TELEPHONE (OUTPATIENT)
Dept: FAMILY MEDICINE CLINIC | Facility: CLINIC | Age: 74
End: 2019-05-08

## 2019-05-08 NOTE — TELEPHONE ENCOUNTER
----- Message from Haider Mcmanus sent at 5/8/2019  9:49 AM CDT -----  Contact: [phone]  Patient left voice message stating that she was admitted to [facility] last night with chest pains.  States that she wants her provider to call because she is not getting any information nor has had anything to eat. She also stated that she was going to check herself out  
Spoke with patient she checked herself [facility] from [facility], has appointment with Rose Marie tomorrow advised if any other issues she can go to ER in [place]  
DISPLAY PLAN FREE TEXT

## 2019-05-09 ENCOUNTER — OFFICE VISIT (OUTPATIENT)
Dept: FAMILY MEDICINE CLINIC | Facility: CLINIC | Age: 74
End: 2019-05-09

## 2019-05-09 VITALS
DIASTOLIC BLOOD PRESSURE: 59 MMHG | WEIGHT: 232 LBS | TEMPERATURE: 97.8 F | HEIGHT: 67 IN | RESPIRATION RATE: 18 BRPM | SYSTOLIC BLOOD PRESSURE: 118 MMHG | BODY MASS INDEX: 36.41 KG/M2 | HEART RATE: 89 BPM | OXYGEN SATURATION: 97 %

## 2019-05-09 DIAGNOSIS — R07.9 CHEST PAIN, UNSPECIFIED TYPE: Primary | ICD-10-CM

## 2019-05-09 PROCEDURE — 99214 OFFICE O/P EST MOD 30 MIN: CPT | Performed by: NURSE PRACTITIONER

## 2019-05-10 NOTE — PATIENT INSTRUCTIONS
Calorie Counting for Weight Loss  Calories are units of energy. Your body needs a certain amount of calories from food to keep you going throughout the day. When you eat more calories than your body needs, your body stores the extra calories as fat. When you eat fewer calories than your body needs, your body burns fat to get the energy it needs.  Calorie counting means keeping track of how many calories you eat and drink each day. Calorie counting can be helpful if you need to lose weight. If you make sure to eat fewer calories than your body needs, you should lose weight. Ask your health care provider what a healthy weight is for you.  For calorie counting to work, you will need to eat the right number of calories in a day in order to lose a healthy amount of weight per week. A dietitian can help you determine how many calories you need in a day and will give you suggestions on how to reach your calorie goal.  · A healthy amount of weight to lose per week is usually 1-2 lb (0.5-0.9 kg). This usually means that your daily calorie intake should be reduced by 500-750 calories.  · Eating 1,200 - 1,500 calories per day can help most women lose weight.  · Eating 1,500 - 1,800 calories per day can help most men lose weight.    What is my plan?  My goal is to have __________ calories per day.  If I have this many calories per day, I should lose around __________ pounds per week.  What do I need to know about calorie counting?  In order to meet your daily calorie goal, you will need to:  · Find out how many calories are in each food you would like to eat. Try to do this before you eat.  · Decide how much of the food you plan to eat.  · Write down what you ate and how many calories it had. Doing this is called keeping a food log.    To successfully lose weight, it is important to balance calorie counting with a healthy lifestyle that includes regular activity. Aim for 150 minutes of moderate exercise (such as walking) or 75  minutes of vigorous exercise (such as running) each week.  Where do I find calorie information?    The number of calories in a food can be found on a Nutrition Facts label. If a food does not have a Nutrition Facts label, try to look up the calories online or ask your dietitian for help.  Remember that calories are listed per serving. If you choose to have more than one serving of a food, you will have to multiply the calories per serving by the amount of servings you plan to eat. For example, the label on a package of bread might say that a serving size is 1 slice and that there are 90 calories in a serving. If you eat 1 slice, you will have eaten 90 calories. If you eat 2 slices, you will have eaten 180 calories.  How do I keep a food log?  Immediately after each meal, record the following information in your food log:  · What you ate. Don't forget to include toppings, sauces, and other extras on the food.  · How much you ate. This can be measured in cups, ounces, or number of items.  · How many calories each food and drink had.  · The total number of calories in the meal.    Keep your food log near you, such as in a small notebook in your pocket, or use a mobile miguel or website. Some programs will calculate calories for you and show you how many calories you have left for the day to meet your goal.  What are some calorie counting tips?  · Use your calories on foods and drinks that will fill you up and not leave you hungry:  ? Some examples of foods that fill you up are nuts and nut butters, vegetables, lean proteins, and high-fiber foods like whole grains. High-fiber foods are foods with more than 5 g fiber per serving.  ? Drinks such as sodas, specialty coffee drinks, alcohol, and juices have a lot of calories, yet do not fill you up.  · Eat nutritious foods and avoid empty calories. Empty calories are calories you get from foods or beverages that do not have many vitamins or protein, such as candy, sweets, and  "soda. It is better to have a nutritious high-calorie food (such as an avocado) than a food with few nutrients (such as a bag of chips).  · Know how many calories are in the foods you eat most often. This will help you calculate calorie counts faster.  · Pay attention to calories in drinks. Low-calorie drinks include water and unsweetened drinks.  · Pay attention to nutrition labels for \"low fat\" or \"fat free\" foods. These foods sometimes have the same amount of calories or more calories than the full fat versions. They also often have added sugar, starch, or salt, to make up for flavor that was removed with the fat.  · Find a way of tracking calories that works for you. Get creative. Try different apps or programs if writing down calories does not work for you.  What are some portion control tips?  · Know how many calories are in a serving. This will help you know how many servings of a certain food you can have.  · Use a measuring cup to measure serving sizes. You could also try weighing out portions on a kitchen scale. With time, you will be able to estimate serving sizes for some foods.  · Take some time to put servings of different foods on your favorite plates, bowls, and cups so you know what a serving looks like.  · Try not to eat straight from a bag or box. Doing this can lead to overeating. Put the amount you would like to eat in a cup or on a plate to make sure you are eating the right portion.  · Use smaller plates, glasses, and bowls to prevent overeating.  · Try not to multitask (for example, watch TV or use your computer) while eating. If it is time to eat, sit down at a table and enjoy your food. This will help you to know when you are full. It will also help you to be aware of what you are eating and how much you are eating.  What are tips for following this plan?  Reading food labels  · Check the calorie count compared to the serving size. The serving size may be smaller than what you are used to " eating.  · Check the source of the calories. Make sure the food you are eating is high in vitamins and protein and low in saturated and trans fats.  Shopping  · Read nutrition labels while you shop. This will help you make healthy decisions before you decide to purchase your food.  · Make a grocery list and stick to it.  Cooking  · Try to cook your favorite foods in a healthier way. For example, try baking instead of frying.  · Use low-fat dairy products.  Meal planning  · Use more fruits and vegetables. Half of your plate should be fruits and vegetables.  · Include lean proteins like poultry and fish.  How do I count calories when eating out?  · Ask for smaller portion sizes.  · Consider sharing an entree and sides instead of getting your own entree.  · If you get your own entree, eat only half. Ask for a box at the beginning of your meal and put the rest of your entree in it so you are not tempted to eat it.  · If calories are listed on the menu, choose the lower calorie options.  · Choose dishes that include vegetables, fruits, whole grains, low-fat dairy products, and lean protein.  · Choose items that are boiled, broiled, grilled, or steamed. Stay away from items that are buttered, battered, fried, or served with cream sauce. Items labeled “crispy” are usually fried, unless stated otherwise.  · Choose water, low-fat milk, unsweetened iced tea, or other drinks without added sugar. If you want an alcoholic beverage, choose a lower calorie option such as a glass of wine or light beer.  · Ask for dressings, sauces, and syrups on the side. These are usually high in calories, so you should limit the amount you eat.  · If you want a salad, choose a garden salad and ask for grilled meats. Avoid extra toppings like gregory, cheese, or fried items. Ask for the dressing on the side, or ask for olive oil and vinegar or lemon to use as dressing.  · Estimate how many servings of a food you are given. For example, a serving of  cooked rice is ½ cup or about the size of half a baseball. Knowing serving sizes will help you be aware of how much food you are eating at restaurants. The list below tells you how big or small some common portion sizes are based on everyday objects:  ? 1 oz--4 stacked dice.  ? 3 oz--1 deck of cards.  ? 1 tsp--1 die.  ? 1 Tbsp--½ a ping-pong ball.  ? 2 Tbsp--1 ping-pong ball.  ? ½ cup--½ baseball.  ? 1 cup--1 baseball.  Summary  · Calorie counting means keeping track of how many calories you eat and drink each day. If you eat fewer calories than your body needs, you should lose weight.  · A healthy amount of weight to lose per week is usually 1-2 lb (0.5-0.9 kg). This usually means reducing your daily calorie intake by 500-750 calories.  · The number of calories in a food can be found on a Nutrition Facts label. If a food does not have a Nutrition Facts label, try to look up the calories online or ask your dietitian for help.  · Use your calories on foods and drinks that will fill you up, and not on foods and drinks that will leave you hungry.  · Use smaller plates, glasses, and bowls to prevent overeating.  This information is not intended to replace advice given to you by your health care provider. Make sure you discuss any questions you have with your health care provider.  Document Released: 12/18/2006 Document Revised: 11/17/2017 Document Reviewed: 11/17/2017  [employer] Interactive Patient Education © 2019 [employer]      Exercising to Lose Weight  Exercising can help you to lose weight. In order to lose weight through exercise, you need to do vigorous-intensity exercise. You can tell that you are exercising with vigorous intensity if you are breathing very hard and fast and cannot hold a conversation while exercising.  Moderate-intensity exercise helps to maintain your current weight. You can tell that you are exercising at a moderate level if you have a higher heart rate and faster breathing, but you are  still able to hold a conversation.  How often should I exercise?  Choose an activity that you enjoy and set realistic goals. Your health care provider can help you to make an activity plan that works for you. Exercise regularly as directed by your health care provider. This may include:  · Doing resistance training twice each week, such as:  ? Push-ups.  ? Sit-ups.  ? Lifting weights.  ? Using resistance bands.  · Doing a given intensity of exercise for a given amount of time. Choose from these options:  ? 150 minutes of moderate-intensity exercise every week.  ? 75 minutes of vigorous-intensity exercise every week.  ? A mix of moderate-intensity and vigorous-intensity exercise every week.    Children, pregnant women, people who are out of shape, people who are overweight, and older adults may need to consult a health care provider for individual recommendations. If you have any sort of medical condition, be sure to consult your health care provider before starting a new exercise program.  What are some activities that can help me to lose weight?  · Walking at a rate of at least 4.5 miles an hour.  · Jogging or running at a rate of 5 miles per hour.  · Biking at a rate of at least 10 miles per hour.  · Lap swimming.  · Roller-skating or in-line skating.  · Cross-country skiing.  · Vigorous competitive sports, such as football, basketball, and soccer.  · Jumping rope.  · Aerobic dancing.  How can I be more active in my day-to-day activities?  · Use the stairs instead of the elevator.  · Take a walk during your lunch break.  · If you drive, park your car farther away from work or school.  · If you take public transportation, get off one stop early and walk the rest of the way.  · Make all of your phone calls while standing up and walking around.  · Get up, stretch, and walk around every 30 minutes throughout the day.  What guidelines should I follow while exercising?  · Do not exercise so much that you hurt yourself,  feel dizzy, or get very short of breath.  · Consult your health care provider prior to starting a new exercise program.  · Wear comfortable clothes and shoes with good support.  · Drink plenty of water while you exercise to prevent dehydration or heat stroke. Body water is lost during exercise and must be replaced.  · Work out until you breathe faster and your heart beats faster.  This information is not intended to replace advice given to you by your health care provider. Make sure you discuss any questions you have with your health care provider.  Document Released: 01/20/2012 Document Revised: 05/25/2017 Document Reviewed: 05/21/2015  [employer] Interactive Patient Education © 2019 [employer]

## 2019-05-10 NOTE — PROGRESS NOTES
Subjective   Pita Hyatt is a 74 y.o. female.     Here today for josephine.  She reports that yesterday she had an episode of chest pain and jaw pain on the left.  She may have also experienced some syncope as well.  She presented to [facility] where she was admitted.  They did not really find anything and she left.  She was told she might need to see a cardiologist for further work up.  She has had no further episodes.  She thinks she might have had some seizure activity when she experienced the syncope.  She sees dr henderson next week.        Chest Pain    This is a new problem. The current episode started in the past 7 days. The onset quality is sudden. The problem has been waxing and waning. The pain is present in the substernal region. The pain is at a severity of 4/10. The pain is moderate. The quality of the pain is described as dull, heavy and tightness. The pain radiates to the left jaw and left neck. Associated symptoms include near-syncope and shortness of breath. Pertinent negatives include no abdominal pain, back pain, claudication, cough, diaphoresis, dizziness, exertional chest pressure, fever, headaches, hemoptysis, irregular heartbeat, leg pain, lower extremity edema, malaise/fatigue, nausea, numbness, orthopnea, palpitations, PND, sputum production, syncope, vomiting or weakness. She has tried nitroglycerin (nitro gave her a headache) for the symptoms. The treatment provided no relief. Risk factors include being elderly, lack of exercise, post-menopausal and obesity.        The following portions of the patient's history were reviewed and updated as appropriate: allergies, current medications, past family history, past medical history, past social history, past surgical history and problem list.    Review of Systems   Constitutional: Negative.  Negative for diaphoresis, fever and malaise/fatigue.   HENT: Negative.    Eyes: Negative.    Respiratory: Positive for shortness of breath. Negative for cough,  hemoptysis and sputum production.    Cardiovascular: Positive for chest pain and near-syncope. Negative for palpitations, orthopnea, claudication, syncope and PND.   Gastrointestinal: Negative.  Negative for abdominal pain, nausea and vomiting.   Endocrine: Negative.    Genitourinary: Negative.    Musculoskeletal: Negative.  Negative for back pain.   Skin: Negative.    Allergic/Immunologic: Negative.    Neurological: Negative.  Negative for dizziness, weakness and numbness.   Hematological: Negative.    Psychiatric/Behavioral: Negative.        Objective   Physical Exam   Constitutional: She is oriented to person, place, and time. She appears well-developed and well-nourished. No distress.   HENT:   Head: Normocephalic and atraumatic.   Mouth/Throat: No oropharyngeal exudate.   Eyes: Pupils are equal, round, and reactive to light.   Neck: Normal range of motion. Neck supple. No thyromegaly present.   Cardiovascular: Normal rate, regular rhythm and normal heart sounds. Exam reveals no friction rub.   No murmur heard.  Pulmonary/Chest: Effort normal and breath sounds normal. No respiratory distress. She has no wheezes. She has no rales.   Abdominal: Soft.   Musculoskeletal: Normal range of motion.   Neurological: She is alert and oriented to person, place, and time.   Skin: Skin is warm and dry.   Psychiatric: She has a normal mood and affect. Thought content normal.   Nursing note and vitals reviewed.        Assessment/Plan   Georgia was seen today for hypothyroidism and heartburn.    Diagnoses and all orders for this visit:    Chest pain, unspecified type  -     Cancel: Ambulatory Referral to Cardiology  -     Ambulatory Referral to Cardiology      We need records from [facility]

## 2019-07-01 ENCOUNTER — OFFICE VISIT (OUTPATIENT)
Dept: CARDIOLOGY | Facility: CLINIC | Age: 74
End: 2019-07-01

## 2019-07-01 VITALS
WEIGHT: 226.9 LBS | BODY MASS INDEX: 35.61 KG/M2 | OXYGEN SATURATION: 99 % | DIASTOLIC BLOOD PRESSURE: 90 MMHG | HEART RATE: 78 BPM | SYSTOLIC BLOOD PRESSURE: 146 MMHG | HEIGHT: 67 IN

## 2019-07-01 DIAGNOSIS — R07.2 PRECORDIAL PAIN: Primary | ICD-10-CM

## 2019-07-01 DIAGNOSIS — R07.9 CHEST PAIN, UNSPECIFIED TYPE: Primary | ICD-10-CM

## 2019-07-01 DIAGNOSIS — E66.9 OBESITY (BMI 30.0-34.9): ICD-10-CM

## 2019-07-01 PROBLEM — E66.09 CLASS 2 OBESITY DUE TO EXCESS CALORIES WITHOUT SERIOUS COMORBIDITY WITH BODY MASS INDEX (BMI) OF 35.0 TO 35.9 IN ADULT: Status: ACTIVE | Noted: 2019-07-01

## 2019-07-01 PROCEDURE — 99204 OFFICE O/P NEW MOD 45 MIN: CPT | Performed by: INTERNAL MEDICINE

## 2019-07-01 PROCEDURE — 93000 ELECTROCARDIOGRAM COMPLETE: CPT | Performed by: INTERNAL MEDICINE

## 2019-07-01 NOTE — PATIENT INSTRUCTIONS
Dr. Aparicio has recommended a pharmacologic (dobutamine) stress test with echocardiography.    What is a Dobutamine Stress Echo Test?     Why do I need a stress test?     This test helps your physician determine how your heart functions when it is made to work harder by injecting dobutamine. Dobutamine is a drug that has an effect on the heart similar to exercise. This test is done on patients that are unable to exercise adequately or have severe lung disease. An echocardiogram, the ultrasound study of the heart, evaluates the heart’s size, how strongly it pumps blood, and how well the valves are working.     The dobutamine stress echo test is useful to determine:   • If there is a decreased supply of blood and oxygen to the heart at rest as well as with exercise   • If there is reduced movement of the heart muscle.   • Overall level of cardiovascular conditioning   • How quickly the heart recovers after exercise   • How hard the heart can work before symptoms develop   • Estimate the risk of surgery that can cause cardiac complications     What happens during the test?   • An echo technician will do a resting scan of your heart while you are lying down on an exam table. You will lie on your back and on your left side  . • A stress  will prep ten small areas on your chest and place electrodes (small, flat, sticky patches) on these areas. The electrodes are attached to an EKG monitor that charts your heart’s electrical activity during the test.   • You will be lying down on an exam table while the technician performs a resting EKG and blood pressure.   • A nurse will place an IV into a vein in your arm or hand. Next, your heart will be “stressed” by injecting a medication called dobutamine into the IV. This medication will increase your heart rate.   • Please tell the technician immediately if you have chest pain, shortness of breath, or any other unusual symptoms at any time.   • The stress lab staff  will watch for any changes on the EKG monitor that suggest the test should be stopped. The testing area is supervised by a physician.   • The echo technician will take images of your heart every three minutes during the test. • At the peak effects of dobutamine, a second echocardiogram will be taken to visualize the heart’s motion with exercise.   • Your heart rate, blood pressure, and EKG will continue to be monitored until the levels are returning to normal. One more echo scan will be done as your heart rate returns to normal    The risks     This test is very safe and there are usually no problems. There is a small risk of an abnormal heartbeat, chest pain or nausea and, if this happens, the appropriate action will be taken. On very rare occasions (approximately 1 in 2000) the test is associated with life-threatening events. If you have any questions about the risks associated with the procedure, your medical team will be able to discuss them with you at the appointment.      Instruction checklist for the dobutamine stress echo test:     . • Bring a list of medications you take with you to the test. Include the name and dosage amounts of each medicine. This information can be found on the prescription or bottle label.     You may take any of your regular morning medications unless otherwise instructed.       § If you have asthma, please bring your inhaler medication with you.     § If you have diabetes, ask your physician how to adjust your medications the day of your test.     • Please refrain from any strenuous exercise or activities the day before your test, or the day of the test     • Do Not eat or drink anything except for small amounts of water for 4 hours prior to your testing time.      • Do Not use lotion or powders on your chest the day of the test.     • Wear loose fitting, comfortable clothing. Pants or shorts and short sleeve shirts are preferred. (No long sleeves.) Do not wear one-piece  undergarments or body suits.      • The Dobutamine Stress Echo takes about one hour to complete including check-in time and actual test time.      • If you need to CANCEL OR CHANGE your appointment, please call [phone].     • If you have any QUESTIONS about the test, Please call [phone] and ask to speak to Dr. Markus Rodriguez's Medical Assistant. Please leave a message if prompted to do so. We will return your call as soon as possible.     • We request a 24 hour notice for changes or cancellations.    You MUST complete the DSE within 30 calendar days of being ordered by Dr. Aparicio.     You MUST arrive for your DSE appointment 10 minutes BEFORE the scheduled time, or your test will be rescheduled.    Results:    Dr. Aparicio will be physically present during your dobutamine stress echo.  You will be given the results of your test in real time.

## 2019-07-01 NOTE — PROGRESS NOTES
"   Cardiovascular Medicine      Collins Aparicio M.D., Ph.D., [facility]         Rose Marie Kelley, APRN  [address]  [place] [address]    Thank you for asking me to see Pita Hyatt for chest pain.    History of Present Illness  This is a 74 y.o. female with:    1. CPS  2. Risks: Obese, HTN, former smoker     Pita Hyatt is a 74 y.o. female who presents for evaluation of chest pain syndrome.The patient tells me that the pain began 1 month(s) ago. She looked up the symptoms. She was concerned about a MI. She then went to [facility] ED. She was admitted to [facility], but tells me that \"they didn't do anything.\" I do not have these records.  The quality is described as  tightness. It is located retrosternal area and jaw area. It occurs randomly. There was not sudden onset of maximal intensity. There is not any sensation of ripping, tearing or stabbing.  The patient denies  exertional dyspnea.  The duration is described as intermittent (10-20 minutes). The pain radiates to the neck and jaw.. The patient denies interscapular pain. The patient  denies  recent  hoarseness.  These symptoms have been occurring rarely. The patient has had the following associated symptoms: none.  The patient denies any epigastric pain or abdominal pain.  The patient denies  back pain.  The patient also denies migrating pain down the back.  The patient has not vomited.. The patient denies syncope.  The patient  denies neurological symptoms including seizure, syncope, limb paresthesias or paresis.  There has not been flank pain.  The patient denies dysphagia, distorted vision, stridor, headache, nasal stuffiness, known pleural effusions or lightheadedness.  Overall, the patient states these symptoms have been stable. Precipitating factors: none described by the patient.  Factors which relieve the discomfort are none.  She also tells me she has vasovagal syncope. She apparently had this occur during her stay at [facility]. But, " otherwise, this is stable.     Previous diagnostic testing for coronary artery disease includes: none. The patient's prior cardiac history:  Previous history of cardiac disease includes none. Patient denies history of arrhythmia, CABG, cardiomyopathy, CHF, coronary angioplasty, coronary artery disease, coronary artery stent, ischemic heart disease, pericarditis, previous M.I. and valvular disease.  Coronary artery disease risk factors include: advanced age (older than 55 for men, 65 for women), hypertension, obesity (BMI >= 30 kg/m2), sedentary lifestyle and smoking/ tobacco exposure.  The patient denies a personal history of genetic syndromes associated with TAA.  The patient has not been diagnosed with ectasia of any portion of the aorta, or overt aneurysmal disease. The patient also denies prior diagnosis of ALLISON or IMH.   The patient denies  a family history of aneurysmal disease and a first-degree or second-degree relative.  The patient denies  a history of aortic valve disease or being diagnosed with BAV.  The patient denies  recent aortic manipulation.  The patient denies  previous cardiovascular surgery.  The patient  denies  prior diagnoses of syphilis, any form of arteritis such as Takayasu's giant cell.   Drug use: denied..       Review of Systems - Review of Systems   Cardiovascular: Positive for chest pain. Negative for cyanosis, dyspnea on exertion, irregular heartbeat, leg swelling and palpitations.   Respiratory: Negative.         All other systems were reviewed and were negative.    family history includes Cancer in her other; Diabetes in her other; Hypertension in her other; Sinusitis in her mother; Stroke in her father and other; Ulcers in her other.     reports that she quit smoking about 39 years ago. She has never used smokeless tobacco. She reports that she drinks alcohol. She reports that she does not use drugs.    Allergies   Allergen Reactions   • Phenergan [Promethazine] Itching  "        Current Outpatient Medications:   •  Cetirizine-Pseudoephedrine (ZYRTEC-D PO), Take 1 tablet by mouth 2 (Two) Times a Day., Disp: , Rfl:   •  cetirizine-pseudoephedrine (ZyrTEC-D) 5-120 MG per 12 hr tablet, TAKE ONE TABLET BY MOUTH TWICE A DAY, Disp: 60 tablet, Rfl: 2  •  cycloSPORINE (RESTASIS) 0.05 % ophthalmic emulsion, Apply 1 drop to eye Every 12 (Twelve) Hours., Disp: 5.5 mL, Rfl: 5  •  fluticasone (FLONASE) 50 MCG/ACT nasal spray, 2 sprays into each nostril Daily., Disp: , Rfl:   •  levothyroxine (SYNTHROID, LEVOTHROID) 100 MCG tablet, TAKE ONE TABLET BY MOUTH DAILY, Disp: 90 tablet, Rfl: 2  •  Mirabegron ER (MYRBETRIQ) 50 MG tablet sustained-release 24 hour 24 hr tablet, Take 50 mg by mouth Daily., Disp: 30 tablet, Rfl: 5  •  omeprazole (priLOSEC) 20 MG capsule, Take 20 mg by mouth Daily., Disp: , Rfl:   •  ondansetron (ZOFRAN) 4 MG tablet, Take 1 tablet by mouth 4 (Four) Times a Day As Needed for Nausea., Disp: 20 tablet, Rfl: 0  •  thiamine (VITAMIN B-1) 100 MG tablet, Take 100 mg by mouth Daily., Disp: , Rfl:   •  traMADol (ULTRAM) 50 MG tablet, Take 1 tablet by mouth Every 8 (Eight) Hours As Needed for Moderate Pain ., Disp: 90 tablet, Rfl: 0  •  traZODone (DESYREL) 50 MG tablet, TAKE ONE TABLET BY MOUTH EVERY NIGHT, Disp: 90 tablet, Rfl: 2  •  vitamin D (ERGOCALCIFEROL) 99943 units capsule capsule, Take 1 capsule by mouth 1 (One) Time Per Week., Disp: 4 capsule, Rfl: 3      Physical Exam:  Vitals:    07/01/19 0853 07/01/19 0854   BP: 150/90 146/90   BP Location: Left arm Right arm   Patient Position: Sitting Sitting   Cuff Size: Adult Adult   Pulse: 78    SpO2: 99%    Weight: 103 kg (226 lb 14.4 oz)    Height: 170.2 cm (67\")    PainSc: 0-No pain    Body mass index is 35.54 kg/m².    Current Pain Level: none  Pulse Ox: Normal  on room air  General: alert, appears stated age and cooperative     Body Habitus: overweight    HEENT: Head: Normocephalic, no lesions, without obvious abnormality. No " arcus senilis, xanthelasma or xanthomas.    Neuro: alert, oriented x3  speech normal in context and clarity  memory intact grossly  Pulses: 2+ and symmetric  JVP: Volume/Pulsation: Normal.  Normal waveforms.   Appropriate inspiratory decrease.  No Kussmaul's. No Lise's.   Carotid Exam: no bruit normal pulsation bilaterally   Carotid Volume: normal.     Subclavian Bruit: absent  Vertebral Bruit: absent  Respirations: no increased work of breathing   Chest:  Normal    Pulmonary:Normal     Precordium: Normal impulses. P2 is not palpable.  RV Heave: absent  LV Heave: absent  [place]:  normal size and placement  Palpable S4: absent.  Heart rate: normal    Heart Rhythm: regular     Heart Sounds: S1: normal intensity  S2: normal intensity  S3: absent   S4: absent  Opening Snap: absent    A2-OS:  no  Pericardial Rub:  Absent   Ejection click: None      Murmurs:  present - II/VI JARED   Extremity: moves all extremities equally.       DATA REVIEWED:     EKG. I personally reviewed and interpreted the EKG.        Lab Results   Component Value Date    GLUCOSE 107 (H) 10/11/2018    BUN 13 10/11/2018    CREATININE 0.70 10/11/2018    EGFRIFNONA 82 10/11/2018    BCR 18.6 10/11/2018    K 4.4 10/11/2018    CO2 24.0 10/11/2018    CALCIUM 9.9 10/11/2018    ALBUMIN 3.00 (L) 10/11/2018    AST 89 (H) 10/11/2018    ALT 59 (H) 10/11/2018     Lab Results   Component Value Date    WBC 4.35 06/08/2018    HGB 12.7 06/08/2018    HCT 39.9 06/08/2018    MCV 91.7 06/08/2018     06/08/2018     Lab Results   Component Value Date    TRIG 116 04/11/2017     Lab Results   Component Value Date    TSH 1.640 06/08/2018     No results found for: CKTOTAL, CKMB, CKMBINDEX, TROPONINI, TROPONINT  Lab Results   Component Value Date    HGBA1C 5.6 09/12/2017     No results found for: DDIMER  Lab Results   Component Value Date    ALT 59 (H) 10/11/2018     Lab Results   Component Value Date    HGBA1C 5.6 09/12/2017    HGBA1C 5.3 11/03/2014     Lab Results  "  Component Value Date    CREATININE 0.70 10/11/2018     Lab Results   Component Value Date    IRON 81 07/25/2016    TIBC 357 07/25/2016     Lab Results   Component Value Date    INR 1.19 05/25/2018    INR 1.15 09/12/2017    INR 1.15 04/11/2017    PROTIME 14.8 05/25/2018    PROTIME 14.6 09/12/2017    PROTIME 14.7 04/11/2017       Assessment/Plan     1. Precordial pain. Pita Hyatt has a chest pain syndrome with pain complaints that are best characterized as atypical.  The patient is High Risk.  An ischemia evaluation is indicated. The patient is not able to exercise. Reason for inability to exercise: orthopedic condition(s): knee pain, s/p bilateral knee replacements and deconditioning.   -Risks/Benefits discussed.   -A hand out was provided on the type of stress test and how to handle additional chest pain complaints. Questions answered.   -I have asked the patient to call 911 or be seen in the ED for further CP complaints.   - Adult Stress Echo W/ Cont or Stress Agent if Necessary Per Protocol  - Adult Transthoracic Echo Complete W/ Cont if Necessary Per Protocol    2. Cardiac Risk Assessment:Dyslipidemia/BP Goals//Diet/Current Weight/Tobacco status/Screening:   -Continue follow-up visits with PCP for monitoring of labs; Dietary changes: Increase soluble fiber: A printed copy of a low fat, low cholesterol diet was given; Reduce saturated fat, \"trans\" monounsaturated fatty acids, and cholesterol; Exercise changes:  Encouraged daily aerobic activity.  -HTN is a significant risk factor for stroke, heart disease and vascular disease. I've recommended the patient continue current medications, if any, as prescribed by the primary care provider. I recommended they have close follow-up for ongoing mgmt of this and the medical comorbidities associated with HTN with their PCP.  -HTN hand-out    General patient education regarding weight:   The patient's BMI is Body mass index is 35.54 kg/m²..  This places the patient " in weight class:  Obese Class II: 35-39.9kg/m2.   -Weight loss hand-out  -Exercise intervention:   Hand out, increase aerobic activity  -Close PCP follow-up for Body mass index is 35.54 kg/m²..     Former tobacco exposure:   -Congratulated on cessation  -Avoid tobacco    3. Murmur, concerning for AS.  -TTE      Return in about 2 months (around 9/1/2019).

## 2019-07-16 DIAGNOSIS — M54.6 THORACIC BACK PAIN, UNSPECIFIED BACK PAIN LATERALITY, UNSPECIFIED CHRONICITY: ICD-10-CM

## 2019-07-16 RX ORDER — TRAMADOL HYDROCHLORIDE 50 MG/1
50 TABLET ORAL EVERY 8 HOURS PRN
Qty: 90 TABLET | Refills: 0 | OUTPATIENT
Start: 2019-07-16 | End: 2019-07-18 | Stop reason: SDUPTHER

## 2019-07-18 DIAGNOSIS — M54.6 THORACIC BACK PAIN, UNSPECIFIED BACK PAIN LATERALITY, UNSPECIFIED CHRONICITY: ICD-10-CM

## 2019-07-18 RX ORDER — TRAMADOL HYDROCHLORIDE 50 MG/1
50 TABLET ORAL EVERY 8 HOURS PRN
Qty: 90 TABLET | Refills: 0 | Status: SHIPPED | OUTPATIENT
Start: 2019-07-18 | End: 2020-06-19 | Stop reason: SDUPTHER

## 2019-07-30 ENCOUNTER — HOSPITAL ENCOUNTER (OUTPATIENT)
Dept: CARDIOLOGY | Facility: HOSPITAL | Age: 74
Discharge: HOME | End: 2019-07-30
Admitting: INTERNAL MEDICINE

## 2019-07-30 VITALS — BODY MASS INDEX: 35.24 KG/M2 | WEIGHT: 225 LBS

## 2019-07-30 LAB
BH CV ECHO MEAS - BSA(HAYCOCK): 2.2 M^2
BH CV ECHO MEAS - BSA: 2.1 M^2
BH CV ECHO MEAS - BZI_BMI: 35.4 KILOGRAMS/M^2
BH CV ECHO MEAS - BZI_METRIC_HEIGHT: 170.2 CM
BH CV ECHO MEAS - BZI_METRIC_WEIGHT: 102.5 KG
BH CV STRESS BP STAGE 1: NORMAL
BH CV STRESS BP STAGE 2: NORMAL
BH CV STRESS DOSE DOBUTAMINE STAGE 1: 10
BH CV STRESS DOSE DOBUTAMINE STAGE 2: 20
BH CV STRESS DOSE DOBUTAMINE STAGE 3: 30
BH CV STRESS DURATION MIN STAGE 1: 3
BH CV STRESS DURATION MIN STAGE 2: 2
BH CV STRESS DURATION MIN STAGE 3: 0
BH CV STRESS DURATION SEC STAGE 1: 0
BH CV STRESS DURATION SEC STAGE 2: 0
BH CV STRESS DURATION SEC STAGE 3: 44
BH CV STRESS ECHO POST STRESS EJECTION FRACTION EF: 70 %
BH CV STRESS HR STAGE 1: 86
BH CV STRESS HR STAGE 2: 129
BH CV STRESS HR STAGE 3: 123
BH CV STRESS PROTOCOL 1: NORMAL
BH CV STRESS RECOVERY BP: NORMAL MMHG
BH CV STRESS RECOVERY HR: 89 BPM
BH CV STRESS STAGE 1: 1
BH CV STRESS STAGE 2: 2
BH CV STRESS STAGE 3: 3
MAXIMAL PREDICTED HEART RATE: 144 BPM
PERCENT MAX PREDICTED HR: 90.97 %
STRESS BASELINE BP: NORMAL MMHG
STRESS BASELINE HR: 82 BPM
STRESS PERCENT HR: 107 %
STRESS POST ESTIMATED WORKLOAD: 1 METS
STRESS POST EXERCISE DUR MIN: 6 MIN
STRESS POST EXERCISE DUR SEC: 44 SEC
STRESS POST PEAK BP: NORMAL MMHG
STRESS POST PEAK HR: 131 BPM
STRESS TARGET HR: 122 BPM

## 2019-07-30 PROCEDURE — 93017 CV STRESS TEST TRACING ONLY: CPT

## 2019-07-30 PROCEDURE — 25010000002 ATROPINE PER 0.01 MG: Performed by: INTERNAL MEDICINE

## 2019-07-30 PROCEDURE — 93351 STRESS TTE COMPLETE: CPT | Performed by: INTERNAL MEDICINE

## 2019-07-30 PROCEDURE — 93350 STRESS TTE ONLY: CPT

## 2019-07-30 PROCEDURE — 93320 DOPPLER ECHO COMPLETE: CPT

## 2019-07-30 PROCEDURE — 25010000002 DOBUTAMINE: Performed by: INTERNAL MEDICINE

## 2019-07-30 PROCEDURE — 93325 DOPPLER ECHO COLOR FLOW MAPG: CPT

## 2019-07-30 RX ORDER — ATROPINE SULFATE 1 MG/ML
0.3 INJECTION, SOLUTION INTRAMUSCULAR; INTRAVENOUS; SUBCUTANEOUS ONCE
Status: COMPLETED | OUTPATIENT
Start: 2019-07-30 | End: 2019-07-30

## 2019-07-30 RX ADMIN — DOBUTAMINE 30 MCG/KG/MIN: 12.5 INJECTION, SOLUTION, CONCENTRATE INTRAVENOUS at 11:47

## 2019-07-30 RX ADMIN — ATROPINE SULFATE 0.3 MG: 1 INJECTION, SOLUTION INTRAMUSCULAR; INTRAVENOUS; SUBCUTANEOUS at 11:48

## 2019-08-05 DIAGNOSIS — E55.9 VITAMIN D DEFICIENCY: ICD-10-CM

## 2019-08-05 RX ORDER — ERGOCALCIFEROL 1.25 MG/1
CAPSULE ORAL
Qty: 4 CAPSULE | Refills: 2 | Status: SHIPPED | OUTPATIENT
Start: 2019-08-05 | End: 2019-11-14 | Stop reason: SDUPTHER

## 2019-08-06 ENCOUNTER — LAB (OUTPATIENT)
Dept: LAB | Facility: HOSPITAL | Age: 74
End: 2019-08-06

## 2019-08-06 ENCOUNTER — OFFICE VISIT (OUTPATIENT)
Dept: FAMILY MEDICINE CLINIC | Facility: CLINIC | Age: 74
End: 2019-08-06

## 2019-08-06 VITALS
RESPIRATION RATE: 18 BRPM | TEMPERATURE: 97.3 F | OXYGEN SATURATION: 98 % | HEIGHT: 67 IN | WEIGHT: 233 LBS | SYSTOLIC BLOOD PRESSURE: 130 MMHG | BODY MASS INDEX: 36.57 KG/M2 | HEART RATE: 84 BPM | DIASTOLIC BLOOD PRESSURE: 76 MMHG

## 2019-08-06 DIAGNOSIS — R73.01 ELEVATED FASTING BLOOD SUGAR: ICD-10-CM

## 2019-08-06 DIAGNOSIS — E03.9 HYPOTHYROIDISM, UNSPECIFIED TYPE: Primary | ICD-10-CM

## 2019-08-06 PROCEDURE — 84443 ASSAY THYROID STIM HORMONE: CPT | Performed by: NURSE PRACTITIONER

## 2019-08-06 PROCEDURE — 83036 HEMOGLOBIN GLYCOSYLATED A1C: CPT

## 2019-08-06 PROCEDURE — 99213 OFFICE O/P EST LOW 20 MIN: CPT | Performed by: NURSE PRACTITIONER

## 2019-08-06 RX ORDER — LEVOTHYROXINE SODIUM 0.1 MG/1
100 TABLET ORAL DAILY
Qty: 90 TABLET | Refills: 2 | OUTPATIENT
Start: 2019-08-06 | End: 2019-10-13 | Stop reason: SDUPTHER

## 2019-08-06 NOTE — PROGRESS NOTES
Subjective   Pita Hyatt is a 74 y.o. female.     Here today for josephine on her hypothyroidism.  She needs refills.  She does have generalized body aches and problems with chronic sinusitis that is managed and treated elsewhere.        Hypothyroidism   This is a chronic problem. The current episode started more than 1 year ago. The problem occurs constantly. The problem has been unchanged. Associated symptoms include arthralgias, congestion and fatigue. Pertinent negatives include no abdominal pain, anorexia, change in bowel habit, chest pain, chills, coughing, diaphoresis, fever, headaches, joint swelling, myalgias, nausea, neck pain, numbness, rash, sore throat, swollen glands, urinary symptoms, vertigo, visual change, vomiting or weakness. The symptoms are aggravated by exertion. Treatments tried: levothyroxine. The treatment provided significant relief.        The following portions of the patient's history were reviewed and updated as appropriate: allergies, current medications, past family history, past medical history, past social history, past surgical history and problem list.    Review of Systems   Constitutional: Positive for fatigue. Negative for chills, diaphoresis and fever.   HENT: Positive for congestion. Negative for sore throat.    Eyes: Negative.    Respiratory: Negative.  Negative for cough.    Cardiovascular: Negative.  Negative for chest pain.   Gastrointestinal: Negative.  Negative for abdominal pain, anorexia, change in bowel habit, nausea and vomiting.   Endocrine: Negative.    Genitourinary: Negative.    Musculoskeletal: Positive for arthralgias. Negative for joint swelling, myalgias and neck pain.   Skin: Negative.  Negative for rash.   Allergic/Immunologic: Negative.    Neurological: Negative.  Negative for vertigo, weakness and numbness.   Hematological: Negative.    Psychiatric/Behavioral: Negative.        Objective   Physical Exam   Constitutional: She is oriented to person, place,  and time. She appears well-developed and well-nourished. No distress.   HENT:   Head: Normocephalic and atraumatic.   Right Ear: External ear normal.   Left Ear: External ear normal.   Nose: Nose normal.   Mouth/Throat: Oropharynx is clear and moist. No oropharyngeal exudate.   Eyes: Pupils are equal, round, and reactive to light.   Neck: Normal range of motion. Neck supple. No thyromegaly present.   Cardiovascular: Normal rate, regular rhythm and normal heart sounds. Exam reveals no friction rub.   No murmur heard.  Pulmonary/Chest: Effort normal and breath sounds normal. No respiratory distress. She has no wheezes. She has no rales.   Abdominal: Soft.   Musculoskeletal: Normal range of motion.   Neurological: She is alert and oriented to person, place, and time.   Skin: Skin is warm and dry.   Psychiatric: She has a normal mood and affect. Thought content normal.   Nursing note and vitals reviewed.        Assessment/Plan   [place] was seen today for heartburn, hypothyroidism and back pain.    Diagnoses and all orders for this visit:    Hypothyroidism, unspecified type  -     levothyroxine (SYNTHROID, LEVOTHROID) 100 MCG tablet; Take 1 tablet by mouth Daily.  -     TSH    BMI 36.0-36.9,adult  Comments:  diet and exercise info given

## 2019-08-07 LAB
HBA1C MFR BLD: 5.3 % (ref 4.8–5.6)
TSH SERPL DL<=0.05 MIU/L-ACNC: 3.7 MIU/ML (ref 0.27–4.2)

## 2019-08-08 LAB
BH CV ECHO MEAS - ACS: 1.7 CM
BH CV ECHO MEAS - AO MAX PG (FULL): 5 MMHG
BH CV ECHO MEAS - AO MAX PG: 13.1 MMHG
BH CV ECHO MEAS - AO MEAN PG (FULL): 3 MMHG
BH CV ECHO MEAS - AO MEAN PG: 8 MMHG
BH CV ECHO MEAS - AO ROOT AREA (BSA CORRECTED): 1.4
BH CV ECHO MEAS - AO ROOT AREA: 7.1 CM^2
BH CV ECHO MEAS - AO ROOT DIAM: 3 CM
BH CV ECHO MEAS - AO V2 MAX: 181 CM/SEC
BH CV ECHO MEAS - AO V2 MEAN: 134 CM/SEC
BH CV ECHO MEAS - AO V2 VTI: 40.1 CM
BH CV ECHO MEAS - ASC AORTA: 3.2 CM
BH CV ECHO MEAS - AVA(I,A): 3.4 CM^2
BH CV ECHO MEAS - AVA(I,D): 3.4 CM^2
BH CV ECHO MEAS - AVA(V,A): 3.3 CM^2
BH CV ECHO MEAS - AVA(V,D): 3.3 CM^2
BH CV ECHO MEAS - BSA(HAYCOCK): 2.3 M^2
BH CV ECHO MEAS - BSA: 2.2 M^2
BH CV ECHO MEAS - BZI_BMI: 36.5 KILOGRAMS/M^2
BH CV ECHO MEAS - BZI_METRIC_HEIGHT: 170.2 CM
BH CV ECHO MEAS - BZI_METRIC_WEIGHT: 105.7 KG
BH CV ECHO MEAS - EDV(CUBED): 85.2 ML
BH CV ECHO MEAS - EDV(MOD-SP2): 65 ML
BH CV ECHO MEAS - EDV(MOD-SP4): 73 ML
BH CV ECHO MEAS - EDV(TEICH): 87.7 ML
BH CV ECHO MEAS - EF(CUBED): 83.8 %
BH CV ECHO MEAS - EF(MOD-BP): 62 %
BH CV ECHO MEAS - EF(MOD-SP2): 66.2 %
BH CV ECHO MEAS - EF(MOD-SP4): 58.9 %
BH CV ECHO MEAS - EF(TEICH): 77 %
BH CV ECHO MEAS - ESV(CUBED): 13.8 ML
BH CV ECHO MEAS - ESV(MOD-SP2): 22 ML
BH CV ECHO MEAS - ESV(MOD-SP4): 30 ML
BH CV ECHO MEAS - ESV(TEICH): 20.2 ML
BH CV ECHO MEAS - FS: 45.5 %
BH CV ECHO MEAS - IVS/LVPW: 1.1
BH CV ECHO MEAS - IVSD: 1.2 CM
BH CV ECHO MEAS - LA DIMENSION: 3.9 CM
BH CV ECHO MEAS - LA/AO: 1.3
BH CV ECHO MEAS - LV DIASTOLIC VOL/BSA (35-75): 33.8 ML/M^2
BH CV ECHO MEAS - LV MASS(C)D: 180 GRAMS
BH CV ECHO MEAS - LV MASS(C)DI: 83.4 GRAMS/M^2
BH CV ECHO MEAS - LV MAX PG: 8.1 MMHG
BH CV ECHO MEAS - LV MEAN PG: 5 MMHG
BH CV ECHO MEAS - LV SYSTOLIC VOL/BSA (12-30): 13.9 ML/M^2
BH CV ECHO MEAS - LV V1 MAX: 142 CM/SEC
BH CV ECHO MEAS - LV V1 MEAN: 104 CM/SEC
BH CV ECHO MEAS - LV V1 VTI: 32.9 CM
BH CV ECHO MEAS - LVIDD: 4.4 CM
BH CV ECHO MEAS - LVIDS: 2.4 CM
BH CV ECHO MEAS - LVLD AP2: 6.7 CM
BH CV ECHO MEAS - LVLD AP4: 7.3 CM
BH CV ECHO MEAS - LVLS AP2: 5.2 CM
BH CV ECHO MEAS - LVLS AP4: 5.9 CM
BH CV ECHO MEAS - LVOT AREA (M): 4.2 CM^2
BH CV ECHO MEAS - LVOT AREA: 4.2 CM^2
BH CV ECHO MEAS - LVOT DIAM: 2.3 CM
BH CV ECHO MEAS - LVPWD: 1.1 CM
BH CV ECHO MEAS - MR MAX PG: 140.2 MMHG
BH CV ECHO MEAS - MR MAX VEL: 592 CM/SEC
BH CV ECHO MEAS - MV A MAX VEL: 81.8 CM/SEC
BH CV ECHO MEAS - MV DEC SLOPE: 569 CM/SEC^2
BH CV ECHO MEAS - MV E MAX VEL: 103 CM/SEC
BH CV ECHO MEAS - MV E/A: 1.3
BH CV ECHO MEAS - MV P1/2T MAX VEL: 117 CM/SEC
BH CV ECHO MEAS - MV P1/2T: 60.2 MSEC
BH CV ECHO MEAS - MVA P1/2T LCG: 1.9 CM^2
BH CV ECHO MEAS - MVA(P1/2T): 3.7 CM^2
BH CV ECHO MEAS - PA MAX PG (FULL): 1.4 MMHG
BH CV ECHO MEAS - PA MAX PG: 5 MMHG
BH CV ECHO MEAS - PA V2 MAX: 112 CM/SEC
BH CV ECHO MEAS - RAP SYSTOLE: 5 MMHG
BH CV ECHO MEAS - RV MAX PG: 3.6 MMHG
BH CV ECHO MEAS - RV MEAN PG: 2 MMHG
BH CV ECHO MEAS - RV V1 MAX: 95.5 CM/SEC
BH CV ECHO MEAS - RV V1 MEAN: 64 CM/SEC
BH CV ECHO MEAS - RV V1 VTI: 23.3 CM
BH CV ECHO MEAS - RVDD: 2.5 CM
BH CV ECHO MEAS - RVSP: 31 MMHG
BH CV ECHO MEAS - SI(AO): 131.4 ML/M^2
BH CV ECHO MEAS - SI(CUBED): 33.1 ML/M^2
BH CV ECHO MEAS - SI(LVOT): 63.4 ML/M^2
BH CV ECHO MEAS - SI(MOD-SP2): 19.9 ML/M^2
BH CV ECHO MEAS - SI(MOD-SP4): 19.9 ML/M^2
BH CV ECHO MEAS - SI(TEICH): 31.3 ML/M^2
BH CV ECHO MEAS - SV(AO): 283.5 ML
BH CV ECHO MEAS - SV(CUBED): 71.4 ML
BH CV ECHO MEAS - SV(LVOT): 136.7 ML
BH CV ECHO MEAS - SV(MOD-SP2): 43 ML
BH CV ECHO MEAS - SV(MOD-SP4): 43 ML
BH CV ECHO MEAS - SV(TEICH): 67.5 ML
BH CV ECHO MEAS - TR MAX VEL: 255 CM/SEC
MAXIMAL PREDICTED HEART RATE: 144 BPM
STRESS TARGET HR: 122 BPM

## 2019-08-08 NOTE — PATIENT INSTRUCTIONS
Calorie Counting for Weight Loss  Calories are units of energy. Your body needs a certain amount of calories from food to keep you going throughout the day. When you eat more calories than your body needs, your body stores the extra calories as fat. When you eat fewer calories than your body needs, your body burns fat to get the energy it needs.  Calorie counting means keeping track of how many calories you eat and drink each day. Calorie counting can be helpful if you need to lose weight. If you make sure to eat fewer calories than your body needs, you should lose weight. Ask your health care provider what a healthy weight is for you.  For calorie counting to work, you will need to eat the right number of calories in a day in order to lose a healthy amount of weight per week. A dietitian can help you determine how many calories you need in a day and will give you suggestions on how to reach your calorie goal.  · A healthy amount of weight to lose per week is usually 1-2 lb (0.5-0.9 kg). This usually means that your daily calorie intake should be reduced by 500-750 calories.  · Eating 1,200 - 1,500 calories per day can help most women lose weight.  · Eating 1,500 - 1,800 calories per day can help most men lose weight.  What is my plan?  My goal is to have __________ calories per day.  If I have this many calories per day, I should lose around __________ pounds per week.  What do I need to know about calorie counting?  In order to meet your daily calorie goal, you will need to:  · Find out how many calories are in each food you would like to eat. Try to do this before you eat.  · Decide how much of the food you plan to eat.  · Write down what you ate and how many calories it had. Doing this is called keeping a food log.  To successfully lose weight, it is important to balance calorie counting with a healthy lifestyle that includes regular activity. Aim for 150 minutes of moderate exercise (such as walking) or 75  minutes of vigorous exercise (such as running) each week.  Where do I find calorie information?    The number of calories in a food can be found on a Nutrition Facts label. If a food does not have a Nutrition Facts label, try to look up the calories online or ask your dietitian for help.  Remember that calories are listed per serving. If you choose to have more than one serving of a food, you will have to multiply the calories per serving by the amount of servings you plan to eat. For example, the label on a package of bread might say that a serving size is 1 slice and that there are 90 calories in a serving. If you eat 1 slice, you will have eaten 90 calories. If you eat 2 slices, you will have eaten 180 calories.  How do I keep a food log?  Immediately after each meal, record the following information in your food log:  · What you ate. Don't forget to include toppings, sauces, and other extras on the food.  · How much you ate. This can be measured in cups, ounces, or number of items.  · How many calories each food and drink had.  · The total number of calories in the meal.  Keep your food log near you, such as in a small notebook in your pocket, or use a mobile miguel or website. Some programs will calculate calories for you and show you how many calories you have left for the day to meet your goal.  What are some calorie counting tips?    · Use your calories on foods and drinks that will fill you up and not leave you hungry:  ? Some examples of foods that fill you up are nuts and nut butters, vegetables, lean proteins, and high-fiber foods like whole grains. High-fiber foods are foods with more than 5 g fiber per serving.  ? Drinks such as sodas, specialty coffee drinks, alcohol, and juices have a lot of calories, yet do not fill you up.  · Eat nutritious foods and avoid empty calories. Empty calories are calories you get from foods or beverages that do not have many vitamins or protein, such as candy, sweets, and  "soda. It is better to have a nutritious high-calorie food (such as an avocado) than a food with few nutrients (such as a bag of chips).  · Know how many calories are in the foods you eat most often. This will help you calculate calorie counts faster.  · Pay attention to calories in drinks. Low-calorie drinks include water and unsweetened drinks.  · Pay attention to nutrition labels for \"low fat\" or \"fat free\" foods. These foods sometimes have the same amount of calories or more calories than the full fat versions. They also often have added sugar, starch, or salt, to make up for flavor that was removed with the fat.  · Find a way of tracking calories that works for you. Get creative. Try different apps or programs if writing down calories does not work for you.  What are some portion control tips?  · Know how many calories are in a serving. This will help you know how many servings of a certain food you can have.  · Use a measuring cup to measure serving sizes. You could also try weighing out portions on a kitchen scale. With time, you will be able to estimate serving sizes for some foods.  · Take some time to put servings of different foods on your favorite plates, bowls, and cups so you know what a serving looks like.  · Try not to eat straight from a bag or box. Doing this can lead to overeating. Put the amount you would like to eat in a cup or on a plate to make sure you are eating the right portion.  · Use smaller plates, glasses, and bowls to prevent overeating.  · Try not to multitask (for example, watch TV or use your computer) while eating. If it is time to eat, sit down at a table and enjoy your food. This will help you to know when you are full. It will also help you to be aware of what you are eating and how much you are eating.  What are tips for following this plan?  Reading food labels  · Check the calorie count compared to the serving size. The serving size may be smaller than what you are used to " eating.  · Check the source of the calories. Make sure the food you are eating is high in vitamins and protein and low in saturated and trans fats.  Shopping  · Read nutrition labels while you shop. This will help you make healthy decisions before you decide to purchase your food.  · Make a grocery list and stick to it.  Cooking  · Try to cook your favorite foods in a healthier way. For example, try baking instead of frying.  · Use low-fat dairy products.  Meal planning  · Use more fruits and vegetables. Half of your plate should be fruits and vegetables.  · Include lean proteins like poultry and fish.  How do I count calories when eating out?  · Ask for smaller portion sizes.  · Consider sharing an entree and sides instead of getting your own entree.  · If you get your own entree, eat only half. Ask for a box at the beginning of your meal and put the rest of your entree in it so you are not tempted to eat it.  · If calories are listed on the menu, choose the lower calorie options.  · Choose dishes that include vegetables, fruits, whole grains, low-fat dairy products, and lean protein.  · Choose items that are boiled, broiled, grilled, or steamed. Stay away from items that are buttered, battered, fried, or served with cream sauce. Items labeled “crispy” are usually fried, unless stated otherwise.  · Choose water, low-fat milk, unsweetened iced tea, or other drinks without added sugar. If you want an alcoholic beverage, choose a lower calorie option such as a glass of wine or light beer.  · Ask for dressings, sauces, and syrups on the side. These are usually high in calories, so you should limit the amount you eat.  · If you want a salad, choose a garden salad and ask for grilled meats. Avoid extra toppings like gregory, cheese, or fried items. Ask for the dressing on the side, or ask for olive oil and vinegar or lemon to use as dressing.  · Estimate how many servings of a food you are given. For example, a serving of  cooked rice is ½ cup or about the size of half a baseball. Knowing serving sizes will help you be aware of how much food you are eating at restaurants. The list below tells you how big or small some common portion sizes are based on everyday objects:  ? 1 oz--4 stacked dice.  ? 3 oz--1 deck of cards.  ? 1 tsp--1 die.  ? 1 Tbsp--½ a ping-pong ball.  ? 2 Tbsp--1 ping-pong ball.  ? ½ cup--½ baseball.  ? 1 cup--1 baseball.  Summary  · Calorie counting means keeping track of how many calories you eat and drink each day. If you eat fewer calories than your body needs, you should lose weight.  · A healthy amount of weight to lose per week is usually 1-2 lb (0.5-0.9 kg). This usually means reducing your daily calorie intake by 500-750 calories.  · The number of calories in a food can be found on a Nutrition Facts label. If a food does not have a Nutrition Facts label, try to look up the calories online or ask your dietitian for help.  · Use your calories on foods and drinks that will fill you up, and not on foods and drinks that will leave you hungry.  · Use smaller plates, glasses, and bowls to prevent overeating.  This information is not intended to replace advice given to you by your health care provider. Make sure you discuss any questions you have with your health care provider.  Document Released: 12/18/2006 Document Revised: 11/17/2017 Document Reviewed: 11/17/2017  [employer] Interactive Patient Education © 2019 [employer]      Exercising to Lose Weight  Exercise is structured, repetitive physical activity to improve fitness and health. Getting regular exercise is important for everyone. It is especially important if you are overweight. Being overweight increases your risk of heart disease, stroke, diabetes, high blood pressure, and several types of cancer. Reducing your calorie intake and exercising can help you lose weight.  Exercise is usually categorized as moderate or vigorous intensity. To lose weight, most  people need to do a certain amount of moderate-intensity or vigorous-intensity exercise each week.  Moderate-intensity exercise    Moderate-intensity exercise is any activity that gets you moving enough to burn at least three times more energy (calories) than if you were sitting.  Examples of moderate exercise include:  · Walking a mile in 15 minutes.  · Doing light yard work.  · Biking at an easy pace.  Most people should get at least 150 minutes (2 hours and 30 minutes) a week of moderate-intensity exercise to maintain their body weight.  Vigorous-intensity exercise  Vigorous-intensity exercise is any activity that gets you moving enough to burn at least six times more calories than if you were sitting. When you exercise at this intensity, you should be working hard enough that you are not able to carry on a conversation.  Examples of vigorous exercise include:  · Running.  · Playing a team sport, such as football, basketball, and soccer.  · Jumping rope.  Most people should get at least 75 minutes (1 hour and 15 minutes) a week of vigorous-intensity exercise to maintain their body weight.  How can exercise affect me?  When you exercise enough to burn more calories than you eat, you lose weight. Exercise also reduces body fat and builds muscle. The more muscle you have, the more calories you burn. Exercise also:  · Improves mood.  · Reduces stress and tension.  · Improves your overall fitness, flexibility, and endurance.  · Increases bone strength.  The amount of exercise you need to lose weight depends on:  · Your age.  · The type of exercise.  · Any health conditions you have.  · Your overall physical ability.  Talk to your health care provider about how much exercise you need and what types of activities are safe for you.  What actions can I take to lose weight?  Nutrition    · Make changes to your diet as told by your health care provider or diet and nutrition specialist (dietitian). This may  include:  ? Eating fewer calories.  ? Eating more protein.  ? Eating less unhealthy fats.  ? Eating a diet that includes fresh fruits and vegetables, whole grains, low-fat dairy products, and lean protein.  ? Avoiding foods with added fat, salt, and sugar.  · Drink plenty of water while you exercise to prevent dehydration or heat stroke.  Activity  · Choose an activity that you enjoy and set realistic goals. Your health care provider can help you make an exercise plan that works for you.  · Exercise at a moderate or vigorous intensity most days of the week.  ? The intensity of exercise may vary from person to person. You can tell how intense a workout is for you by paying attention to your breathing and heartbeat. Most people will notice their breathing and heartbeat get faster with more intense exercise.  · Do resistance training twice each week, such as:  ? Push-ups.  ? Sit-ups.  ? Lifting weights.  ? Using resistance bands.  · Getting short amounts of exercise can be just as helpful as long structured periods of exercise. If you have trouble finding time to exercise, try to include exercise in your daily routine.  ? Get up, stretch, and walk around every 30 minutes throughout the day.  ? Go for a walk during your lunch break.  ? Park your car farther away from your destination.  ? If you take public transportation, get off one stop early and walk the rest of the way.  ? Make phone calls while standing up and walking around.  ? Take the stairs instead of elevators or escalators.  · Wear comfortable clothes and shoes with good support.  · Do not exercise so much that you hurt yourself, feel dizzy, or get very short of breath.  Where to find more information  · [place] Department of Health and Human Services: www.hhs.gov  · Centers for Disease Control and Prevention (CDC): www.cdc.gov  Contact a health care provider:  · Before starting a new exercise program.  · If you have questions or concerns about your  weight.  · If you have a medical problem that keeps you from exercising.  Get help right away if you have any of the following while exercising:  · Injury.  · Dizziness.  · Difficulty breathing or shortness of breath that does not go away when you stop exercising.  · Chest pain.  · Rapid heartbeat.  Summary  · Being overweight increases your risk of heart disease, stroke, diabetes, high blood pressure, and several types of cancer.  · Losing weight happens when you burn more calories than you eat.  · Reducing the amount of calories you eat in addition to getting regular moderate or vigorous exercise each week helps you lose weight.  This information is not intended to replace advice given to you by your health care provider. Make sure you discuss any questions you have with your health care provider.  Document Released: 01/20/2012 Document Revised: 12/31/2018 Document Reviewed: 12/31/2018  [employer] Interactive Patient Education © 2019 [employer]

## 2019-08-12 ENCOUNTER — OFFICE VISIT (OUTPATIENT)
Dept: FAMILY MEDICINE CLINIC | Facility: CLINIC | Age: 74
End: 2019-08-12

## 2019-08-12 VITALS
OXYGEN SATURATION: 99 % | SYSTOLIC BLOOD PRESSURE: 130 MMHG | TEMPERATURE: 98.2 F | BODY MASS INDEX: 36.38 KG/M2 | HEIGHT: 67 IN | WEIGHT: 231.8 LBS | DIASTOLIC BLOOD PRESSURE: 72 MMHG | HEART RATE: 83 BPM

## 2019-08-12 DIAGNOSIS — E66.01 MORBIDLY OBESE (HCC): ICD-10-CM

## 2019-08-12 DIAGNOSIS — Z00.00 MEDICARE ANNUAL WELLNESS VISIT, INITIAL: Primary | ICD-10-CM

## 2019-08-12 PROCEDURE — G0439 PPPS, SUBSEQ VISIT: HCPCS | Performed by: NURSE PRACTITIONER

## 2019-08-12 NOTE — PATIENT INSTRUCTIONS
Fall Prevention in the Home, Adult  Falls can cause injuries and can affect people from all age groups. There are many simple things that you can do to make your home safe and to help prevent falls. Ask for help when making these changes, if needed.  What actions can I take to prevent falls?  General instructions  · Use good lighting in all rooms. Replace any light bulbs that burn out.  · Turn on lights if it is dark. Use night-lights.  · Place frequently used items in easy-to-reach places. Lower the shelves around your home if necessary.  · Set up furniture so that there are clear paths around it. Avoid moving your furniture around.  · Remove throw rugs and other tripping hazards from the floor.  · Avoid walking on wet floors.  · Fix any uneven floor surfaces.  · Add color or contrast paint or tape to grab bars and handrails in your home. Place contrasting color strips on the first and last steps of stairways.  · When you use a stepladder, make sure that it is completely opened and that the sides are firmly locked. Have someone hold the ladder while you are using it. Do not climb a closed stepladder.  · Be aware of any and all pets.  What can I do in the bathroom?    · Keep the floor dry. Immediately clean up any water that spills onto the floor.  · Remove soap buildup in the tub or shower on a regular basis.  · Use non-skid mats or decals on the floor of the tub or shower.  · Attach bath mats securely with double-sided, non-slip rug tape.  · If you need to sit down while you are in the shower, use a plastic, non-slip stool.  · Install grab bars by the toilet and in the tub and shower. Do not use towel bars as grab bars.  What can I do in the bedroom?  · Make sure that a bedside light is easy to reach.  · Do not use oversized bedding that drapes onto the floor.  · Have a firm chair that has side arms to use for getting dressed.  What can I do in the kitchen?  · Clean up any spills right away.  · If you need to  reach for something above you, use a sturdy step stool that has a grab bar.  · Keep electrical cables out of the way.  · Do not use floor polish or wax that makes floors slippery. If you must use wax, make sure that it is non-skid floor wax.  What can I do in the stairways?  · Do not leave any items on the stairs.  · Make sure that you have a light switch at the top of the stairs and the bottom of the stairs. Have them installed if you do not have them.  · Make sure that there are handrails on both sides of the stairs. Fix handrails that are broken or loose. Make sure that handrails are as long as the stairways.  · Install non-slip stair treads on all stairs in your home.  · Avoid having throw rugs at the top or bottom of stairways, or secure the rugs with carpet tape to prevent them from moving.  · Choose a carpet design that does not hide the edge of steps on the stairway.  · Check any carpeting to make sure that it is firmly attached to the stairs. Fix any carpet that is loose or worn.  What can I do on the outside of my home?  · Use bright outdoor lighting.  · Regularly repair the edges of walkways and driveways and fix any cracks.  · Remove high doorway thresholds.  · Trim any shrubbery on the main path into your home.  · Regularly check that handrails are securely fastened and in good repair. Both sides of any steps should have handrails.  · Install guardrails along the edges of any raised decks or porches.  · Clear walkways of debris and clutter, including tools and rocks.  · Have leaves, snow, and ice cleared regularly.  · Use sand or salt on walkways during winter months.  · In the garage, clean up any spills right away, including grease or oil spills.  What other actions can I take?  · Wear closed-toe shoes that fit well and support your feet. Wear shoes that have rubber soles or low heels.  · Use mobility aids as needed, such as canes, walkers, scooters, and crutches.  · Review your medicines with your  health care provider. Some medicines can cause dizziness or changes in blood pressure, which increase your risk of falling.  Talk with your health care provider about other ways that you can decrease your risk of falls. This may include working with a physical therapist or  to improve your strength, balance, and endurance.  Where to find more information  · Centers for Disease Control and Prevention, [place]: https://www.cdc.gov  · National [place] on Aging: https://he3ctzb.frank.nih.gov  Contact a health care provider if:  · You are afraid of falling at home.  · You feel weak, drowsy, or dizzy at home.  · You fall at home.  Summary  · There are many simple things that you can do to make your home safe and to help prevent falls.  · Ways to make your home safe include removing tripping hazards and installing grab bars in the bathroom.  · Ask for help when making these changes in your home.  This information is not intended to replace advice given to you by your health care provider. Make sure you discuss any questions you have with your health care provider.  Document Released: 12/08/2003 Document Revised: 08/02/2018 Document Reviewed: 08/02/2018  [employer] Interactive Patient Education © 2019 [employer]  Exercising to Stay Healthy  To become healthy and stay healthy, it is recommended that you do moderate-intensity and vigorous-intensity exercise. You can tell that you are exercising at a moderate intensity if your heart starts beating faster and you start breathing faster but can still hold a conversation. You can tell that you are exercising at a vigorous intensity if you are breathing much harder and faster and cannot hold a conversation while exercising.  Exercising regularly is important. It has many health benefits, such as:  · Improving overall fitness, flexibility, and endurance.  · Increasing bone density.  · Helping with weight control.  · Decreasing body fat.  · Increasing muscle  strength.  · Reducing stress and tension.  · Improving overall health.  How often should I exercise?  Choose an activity that you enjoy, and set realistic goals. Your health care provider can help you make an activity plan that works for you.  Exercise regularly as told by your health care provider. This may include:  · Doing strength training two times a week, such as:  ? Lifting weights.  ? Using resistance bands.  ? Push-ups.  ? Sit-ups.  ? Yoga.  · Doing a certain intensity of exercise for a given amount of time. Choose from these options:  ? A total of 150 minutes of moderate-intensity exercise every week.  ? A total of 75 minutes of vigorous-intensity exercise every week.  ? A mix of moderate-intensity and vigorous-intensity exercise every week.  Children, pregnant women, people who have not exercised regularly, people who are overweight, and older adults may need to talk with a health care provider about what activities are safe to do. If you have a medical condition, be sure to talk with your health care provider before you start a new exercise program.  What are some exercise ideas?  Moderate-intensity exercise ideas include:  · Walking 1 mile (1.6 km) in about 15 minutes.  · Biking.  · Hiking.  · Golfing.  · Dancing.  · Water aerobics.  Vigorous-intensity exercise ideas include:  · Walking 4.5 miles (7.2 km) or more in about 1 hour.  · Jogging or running 5 miles (8 km) in about 1 hour.  · Biking 10 miles (16.1 km) or more in about 1 hour.  · Lap swimming.  · Roller-skating or in-line skating.  · Cross-country skiing.  · Vigorous competitive sports, such as football, basketball, and soccer.  · Jumping rope.  · Aerobic dancing.  What are some everyday activities that can help me to get exercise?  · Yard work, such as:  ? Pushing a .  ? Raking and bagging leaves.  · Washing your car.  · Pushing a stroller.  · Shoveling snow.  · Gardening.  · Washing windows or floors.  How can I be more active in my  day-to-day activities?  · Use stairs instead of an elevator.  · Take a walk during your lunch break.  · If you drive, park your car farther away from your work or school.  · If you take public transportation, get off one stop early and walk the rest of the way.  · Stand up or walk around during all of your indoor phone calls.  · Get up, stretch, and walk around every 30 minutes throughout the day.  · Enjoy exercise with a friend. Support to continue exercising will help you keep a regular routine of activity.  What guidelines can I follow while exercising?  · Before you start a new exercise program, talk with your health care provider.  · Do not exercise so much that you hurt yourself, feel dizzy, or get very short of breath.  · Wear comfortable clothes and wear shoes with good support.  · Drink plenty of water while you exercise to prevent dehydration or heat stroke.  · Work out until your breathing and your heartbeat get faster.  Where to find more information  · [place] Department of Health and Human Services: www.hhs.gov  · Centers for Disease Control and Prevention (CDC): www.cdc.gov  Summary  · Exercising regularly is important. It will improve your overall fitness, flexibility, and endurance.  · Regular exercise also will improve your overall health. It can help you control your weight, reduce stress, and improve your bone density.  · Do not exercise so much that you hurt yourself, feel dizzy, or get very short of breath.  · Before you start a new exercise program, talk with your health care provider.  This information is not intended to replace advice given to you by your health care provider. Make sure you discuss any questions you have with your health care provider.  Document Released: 01/20/2012 Document Revised: 11/08/2018 Document Reviewed: 11/08/2018  [employer] Interactive Patient Education © 2019 [employer]  Calorie Counting for Weight Loss  Calories are units of energy. Your body needs a certain  amount of calories from food to keep you going throughout the day. When you eat more calories than your body needs, your body stores the extra calories as fat. When you eat fewer calories than your body needs, your body burns fat to get the energy it needs.  Calorie counting means keeping track of how many calories you eat and drink each day. Calorie counting can be helpful if you need to lose weight. If you make sure to eat fewer calories than your body needs, you should lose weight. Ask your health care provider what a healthy weight is for you.  For calorie counting to work, you will need to eat the right number of calories in a day in order to lose a healthy amount of weight per week. A dietitian can help you determine how many calories you need in a day and will give you suggestions on how to reach your calorie goal.  · A healthy amount of weight to lose per week is usually 1-2 lb (0.5-0.9 kg). This usually means that your daily calorie intake should be reduced by 500-750 calories.  · Eating 1,200 - 1,500 calories per day can help most women lose weight.  · Eating 1,500 - 1,800 calories per day can help most men lose weight.  What is my plan?  My goal is to have __________ calories per day.  If I have this many calories per day, I should lose around __________ pounds per week.  What do I need to know about calorie counting?  In order to meet your daily calorie goal, you will need to:  · Find out how many calories are in each food you would like to eat. Try to do this before you eat.  · Decide how much of the food you plan to eat.  · Write down what you ate and how many calories it had. Doing this is called keeping a food log.  To successfully lose weight, it is important to balance calorie counting with a healthy lifestyle that includes regular activity. Aim for 150 minutes of moderate exercise (such as walking) or 75 minutes of vigorous exercise (such as running) each week.  Where do I find calorie  information?    The number of calories in a food can be found on a Nutrition Facts label. If a food does not have a Nutrition Facts label, try to look up the calories online or ask your dietitian for help.  Remember that calories are listed per serving. If you choose to have more than one serving of a food, you will have to multiply the calories per serving by the amount of servings you plan to eat. For example, the label on a package of bread might say that a serving size is 1 slice and that there are 90 calories in a serving. If you eat 1 slice, you will have eaten 90 calories. If you eat 2 slices, you will have eaten 180 calories.  How do I keep a food log?  Immediately after each meal, record the following information in your food log:  · What you ate. Don't forget to include toppings, sauces, and other extras on the food.  · How much you ate. This can be measured in cups, ounces, or number of items.  · How many calories each food and drink had.  · The total number of calories in the meal.  Keep your food log near you, such as in a small notebook in your pocket, or use a mobile miguel or website. Some programs will calculate calories for you and show you how many calories you have left for the day to meet your goal.  What are some calorie counting tips?    · Use your calories on foods and drinks that will fill you up and not leave you hungry:  ? Some examples of foods that fill you up are nuts and nut butters, vegetables, lean proteins, and high-fiber foods like whole grains. High-fiber foods are foods with more than 5 g fiber per serving.  ? Drinks such as sodas, specialty coffee drinks, alcohol, and juices have a lot of calories, yet do not fill you up.  · Eat nutritious foods and avoid empty calories. Empty calories are calories you get from foods or beverages that do not have many vitamins or protein, such as candy, sweets, and soda. It is better to have a nutritious high-calorie food (such as an avocado) than  "a food with few nutrients (such as a bag of chips).  · Know how many calories are in the foods you eat most often. This will help you calculate calorie counts faster.  · Pay attention to calories in drinks. Low-calorie drinks include water and unsweetened drinks.  · Pay attention to nutrition labels for \"low fat\" or \"fat free\" foods. These foods sometimes have the same amount of calories or more calories than the full fat versions. They also often have added sugar, starch, or salt, to make up for flavor that was removed with the fat.  · Find a way of tracking calories that works for you. Get creative. Try different apps or programs if writing down calories does not work for you.  What are some portion control tips?  · Know how many calories are in a serving. This will help you know how many servings of a certain food you can have.  · Use a measuring cup to measure serving sizes. You could also try weighing out portions on a kitchen scale. With time, you will be able to estimate serving sizes for some foods.  · Take some time to put servings of different foods on your favorite plates, bowls, and cups so you know what a serving looks like.  · Try not to eat straight from a bag or box. Doing this can lead to overeating. Put the amount you would like to eat in a cup or on a plate to make sure you are eating the right portion.  · Use smaller plates, glasses, and bowls to prevent overeating.  · Try not to multitask (for example, watch TV or use your computer) while eating. If it is time to eat, sit down at a table and enjoy your food. This will help you to know when you are full. It will also help you to be aware of what you are eating and how much you are eating.  What are tips for following this plan?  Reading food labels  · Check the calorie count compared to the serving size. The serving size may be smaller than what you are used to eating.  · Check the source of the calories. Make sure the food you are eating is high " in vitamins and protein and low in saturated and trans fats.  Shopping  · Read nutrition labels while you shop. This will help you make healthy decisions before you decide to purchase your food.  · Make a grocery list and stick to it.  Cooking  · Try to cook your favorite foods in a healthier way. For example, try baking instead of frying.  · Use low-fat dairy products.  Meal planning  · Use more fruits and vegetables. Half of your plate should be fruits and vegetables.  · Include lean proteins like poultry and fish.  How do I count calories when eating out?  · Ask for smaller portion sizes.  · Consider sharing an entree and sides instead of getting your own entree.  · If you get your own entree, eat only half. Ask for a box at the beginning of your meal and put the rest of your entree in it so you are not tempted to eat it.  · If calories are listed on the menu, choose the lower calorie options.  · Choose dishes that include vegetables, fruits, whole grains, low-fat dairy products, and lean protein.  · Choose items that are boiled, broiled, grilled, or steamed. Stay away from items that are buttered, battered, fried, or served with cream sauce. Items labeled “crispy” are usually fried, unless stated otherwise.  · Choose water, low-fat milk, unsweetened iced tea, or other drinks without added sugar. If you want an alcoholic beverage, choose a lower calorie option such as a glass of wine or light beer.  · Ask for dressings, sauces, and syrups on the side. These are usually high in calories, so you should limit the amount you eat.  · If you want a salad, choose a garden salad and ask for grilled meats. Avoid extra toppings like gregory, cheese, or fried items. Ask for the dressing on the side, or ask for olive oil and vinegar or lemon to use as dressing.  · Estimate how many servings of a food you are given. For example, a serving of cooked rice is ½ cup or about the size of half a baseball. Knowing serving sizes will  help you be aware of how much food you are eating at restaurants. The list below tells you how big or small some common portion sizes are based on everyday objects:  ? 1 oz--4 stacked dice.  ? 3 oz--1 deck of cards.  ? 1 tsp--1 die.  ? 1 Tbsp--½ a ping-pong ball.  ? 2 Tbsp--1 ping-pong ball.  ? ½ cup--½ baseball.  ? 1 cup--1 baseball.  Summary  · Calorie counting means keeping track of how many calories you eat and drink each day. If you eat fewer calories than your body needs, you should lose weight.  · A healthy amount of weight to lose per week is usually 1-2 lb (0.5-0.9 kg). This usually means reducing your daily calorie intake by 500-750 calories.  · The number of calories in a food can be found on a Nutrition Facts label. If a food does not have a Nutrition Facts label, try to look up the calories online or ask your dietitian for help.  · Use your calories on foods and drinks that will fill you up, and not on foods and drinks that will leave you hungry.  · Use smaller plates, glasses, and bowls to prevent overeating.  This information is not intended to replace advice given to you by your health care provider. Make sure you discuss any questions you have with your health care provider.  Document Released: 12/18/2006 Document Revised: 11/17/2017 Document Reviewed: 11/17/2017  [employer] Interactive Patient Education © 2019 [employer]  Preventive Care 65 Years and Older, Female  Preventive care refers to lifestyle choices and visits with your health care provider that can promote health and wellness.  What does preventive care include?  · A yearly physical exam. This is also called an annual well check.  · Dental exams once or twice a year.  · Routine eye exams. Ask your health care provider how often you should have your eyes checked.  · Personal lifestyle choices, including:  ? Daily care of your teeth and gums.  ? Regular physical activity.  ? Eating a healthy diet.  ? Avoiding tobacco and drug  use.  ? Limiting alcohol use.  ? Practicing safe sex.  ? Taking low-dose aspirin every day.  ? Taking vitamin and mineral supplements as recommended by your health care provider.  What happens during an annual well check?  The services and screenings done by your health care provider during your annual well check will depend on your age, overall health, lifestyle risk factors, and family history of disease.  Counseling  Your health care provider may ask you questions about your:  · Alcohol use.  · Tobacco use.  · Drug use.  · Emotional well-being.  · Home and relationship well-being.  · Sexual activity.  · Eating habits.  · History of falls.  · Memory and ability to understand (cognition).  · Work and work environment.  · Reproductive health.    Screening  You may have the following tests or measurements:  · Height, weight, and BMI.  · Blood pressure.  · Lipid and cholesterol levels. These may be checked every 5 years, or more frequently if you are over 50 years old.  · Skin check.  · Lung cancer screening. You may have this screening every year starting at age 55 if you have a 30-pack-year history of smoking and currently smoke or have quit within the past 15 years.  · Colorectal cancer screening. All adults should have this screening starting at age 50 and continuing until age 75. You will have tests every 1-10 years, depending on your results and the type of screening test. People at increased risk should start screening at an earlier age. Screening tests may include:  ? Guaiac-based fecal occult blood testing.  ? Fecal immunochemical test (FIT).  ? Stool DNA test.  ? Virtual colonoscopy.  ? Sigmoidoscopy. During this test, a flexible tube with a tiny camera (sigmoidoscope) is used to examine your rectum and lower colon. The sigmoidoscope is inserted through your anus into your rectum and lower colon.  ? Colonoscopy. During this test, a long, thin, flexible tube with a tiny camera (colonoscope) is used to examine  your entire colon and rectum.  · Hepatitis C blood test.  · Hepatitis B blood test.  · Sexually transmitted disease (STD) testing.  · Diabetes screening. This is done by checking your blood sugar (glucose) after you have not eaten for a while (fasting). You may have this done every 1-3 years.  · Bone density scan. This is done to screen for osteoporosis. You may have this done starting at age 65.  · Mammogram. This may be done every 1-2 years. Talk to your health care provider about how often you should have regular mammograms.  Talk with your health care provider about your test results, treatment options, and if necessary, the need for more tests.  Vaccines  Your health care provider may recommend certain vaccines, such as:  · Influenza vaccine. This is recommended every year.  · Tetanus, diphtheria, and acellular pertussis (Tdap, Td) vaccine. You may need a Td booster every 10 years.  · Varicella vaccine. You may need this if you have not been vaccinated.  · Zoster vaccine. You may need this after age 60.  · Measles, mumps, and rubella (MMR) vaccine. You may need at least one dose of MMR if you were born in 1957 or later. You may also need a second dose.  · Pneumococcal 13-valent conjugate (PCV13) vaccine. One dose is recommended after age 65.  · Pneumococcal polysaccharide (PPSV23) vaccine. One dose is recommended after age 65.  · Meningococcal vaccine. You may need this if you have certain conditions.  · Hepatitis A vaccine. You may need this if you have certain conditions or if you travel or work in places where you may be exposed to hepatitis A.  · Hepatitis B vaccine. You may need this if you have certain conditions or if you travel or work in places where you may be exposed to hepatitis B.  · Haemophilus influenzae type b (Hib) vaccine. You may need this if you have certain conditions.  Talk to your health care provider about which screenings and vaccines you need and how often you need them.  This  information is not intended to replace advice given to you by your health care provider. Make sure you discuss any questions you have with your health care provider.  Document Released: 2017 Document Revised: 2019 Document Reviewed: 10/18/2016  [employer] Interactive Patient Education © 2019 [employer]    Medicare Wellness  Personal Prevention Plan of Service     Date of Office Visit:  2019  Encounter Provider:  SHAHZAD Sommer  Place of Service:  [facility]  Patient Name: Pita Hyatt  :  1945    As part of the Medicare Wellness portion of your visit today, we are providing you with this personalized preventive plan of services (PPPS). This plan is based upon recommendations of the [place] Preventive Services Task Force (USPSTF) and the Advisory Committee on Immunization Practices (ACIP).    This lists the preventive care services that should be considered, and provides dates of when you are due. Items listed as completed are up-to-date and do not require any further intervention.    Health Maintenance   Topic Date Due   • MEDICARE ANNUAL WELLNESS  2018   • INFLUENZA VACCINE  2019 (Originally 2019)   • COLONOSCOPY  2027   • TDAP/TD VACCINES (2 - Td) 2027   • PNEUMOCOCCAL VACCINES (65+ LOW/MEDIUM RISK)  Discontinued   • MAMMOGRAM  Discontinued   • ZOSTER VACCINE  Discontinued       No orders of the defined types were placed in this encounter.      Return in about 1 year (around 2020) for Medicare Wellness subsequent.        Calorie Counting for Weight Loss  Calories are units of energy. Your body needs a certain amount of calories from food to keep you going throughout the day. When you eat more calories than your body needs, your body stores the extra calories as fat. When you eat fewer calories than your body needs, your body burns fat to get the energy it needs.  Calorie counting means  keeping track of how many calories you eat and drink each day. Calorie counting can be helpful if you need to lose weight. If you make sure to eat fewer calories than your body needs, you should lose weight. Ask your health care provider what a healthy weight is for you.  For calorie counting to work, you will need to eat the right number of calories in a day in order to lose a healthy amount of weight per week. A dietitian can help you determine how many calories you need in a day and will give you suggestions on how to reach your calorie goal.  · A healthy amount of weight to lose per week is usually 1-2 lb (0.5-0.9 kg). This usually means that your daily calorie intake should be reduced by 500-750 calories.  · Eating 1,200 - 1,500 calories per day can help most women lose weight.  · Eating 1,500 - 1,800 calories per day can help most men lose weight.  What is my plan?  My goal is to have __________ calories per day.  If I have this many calories per day, I should lose around __________ pounds per week.  What do I need to know about calorie counting?  In order to meet your daily calorie goal, you will need to:  · Find out how many calories are in each food you would like to eat. Try to do this before you eat.  · Decide how much of the food you plan to eat.  · Write down what you ate and how many calories it had. Doing this is called keeping a food log.  To successfully lose weight, it is important to balance calorie counting with a healthy lifestyle that includes regular activity. Aim for 150 minutes of moderate exercise (such as walking) or 75 minutes of vigorous exercise (such as running) each week.  Where do I find calorie information?    The number of calories in a food can be found on a Nutrition Facts label. If a food does not have a Nutrition Facts label, try to look up the calories online or ask your dietitian for help.  Remember that calories are listed per serving. If you choose to have more than one  serving of a food, you will have to multiply the calories per serving by the amount of servings you plan to eat. For example, the label on a package of bread might say that a serving size is 1 slice and that there are 90 calories in a serving. If you eat 1 slice, you will have eaten 90 calories. If you eat 2 slices, you will have eaten 180 calories.  How do I keep a food log?  Immediately after each meal, record the following information in your food log:  · What you ate. Don't forget to include toppings, sauces, and other extras on the food.  · How much you ate. This can be measured in cups, ounces, or number of items.  · How many calories each food and drink had.  · The total number of calories in the meal.  Keep your food log near you, such as in a small notebook in your pocket, or use a mobile miguel or website. Some programs will calculate calories for you and show you how many calories you have left for the day to meet your goal.  What are some calorie counting tips?    · Use your calories on foods and drinks that will fill you up and not leave you hungry:  ? Some examples of foods that fill you up are nuts and nut butters, vegetables, lean proteins, and high-fiber foods like whole grains. High-fiber foods are foods with more than 5 g fiber per serving.  ? Drinks such as sodas, specialty coffee drinks, alcohol, and juices have a lot of calories, yet do not fill you up.  · Eat nutritious foods and avoid empty calories. Empty calories are calories you get from foods or beverages that do not have many vitamins or protein, such as candy, sweets, and soda. It is better to have a nutritious high-calorie food (such as an avocado) than a food with few nutrients (such as a bag of chips).  · Know how many calories are in the foods you eat most often. This will help you calculate calorie counts faster.  · Pay attention to calories in drinks. Low-calorie drinks include water and unsweetened drinks.  · Pay attention to  "nutrition labels for \"low fat\" or \"fat free\" foods. These foods sometimes have the same amount of calories or more calories than the full fat versions. They also often have added sugar, starch, or salt, to make up for flavor that was removed with the fat.  · Find a way of tracking calories that works for you. Get creative. Try different apps or programs if writing down calories does not work for you.  What are some portion control tips?  · Know how many calories are in a serving. This will help you know how many servings of a certain food you can have.  · Use a measuring cup to measure serving sizes. You could also try weighing out portions on a kitchen scale. With time, you will be able to estimate serving sizes for some foods.  · Take some time to put servings of different foods on your favorite plates, bowls, and cups so you know what a serving looks like.  · Try not to eat straight from a bag or box. Doing this can lead to overeating. Put the amount you would like to eat in a cup or on a plate to make sure you are eating the right portion.  · Use smaller plates, glasses, and bowls to prevent overeating.  · Try not to multitask (for example, watch TV or use your computer) while eating. If it is time to eat, sit down at a table and enjoy your food. This will help you to know when you are full. It will also help you to be aware of what you are eating and how much you are eating.  What are tips for following this plan?  Reading food labels  · Check the calorie count compared to the serving size. The serving size may be smaller than what you are used to eating.  · Check the source of the calories. Make sure the food you are eating is high in vitamins and protein and low in saturated and trans fats.  Shopping  · Read nutrition labels while you shop. This will help you make healthy decisions before you decide to purchase your food.  · Make a grocery list and stick to it.  Cooking  · Try to cook your favorite foods in a " healthier way. For example, try baking instead of frying.  · Use low-fat dairy products.  Meal planning  · Use more fruits and vegetables. Half of your plate should be fruits and vegetables.  · Include lean proteins like poultry and fish.  How do I count calories when eating out?  · Ask for smaller portion sizes.  · Consider sharing an entree and sides instead of getting your own entree.  · If you get your own entree, eat only half. Ask for a box at the beginning of your meal and put the rest of your entree in it so you are not tempted to eat it.  · If calories are listed on the menu, choose the lower calorie options.  · Choose dishes that include vegetables, fruits, whole grains, low-fat dairy products, and lean protein.  · Choose items that are boiled, broiled, grilled, or steamed. Stay away from items that are buttered, battered, fried, or served with cream sauce. Items labeled “crispy” are usually fried, unless stated otherwise.  · Choose water, low-fat milk, unsweetened iced tea, or other drinks without added sugar. If you want an alcoholic beverage, choose a lower calorie option such as a glass of wine or light beer.  · Ask for dressings, sauces, and syrups on the side. These are usually high in calories, so you should limit the amount you eat.  · If you want a salad, choose a garden salad and ask for grilled meats. Avoid extra toppings like gregory, cheese, or fried items. Ask for the dressing on the side, or ask for olive oil and vinegar or lemon to use as dressing.  · Estimate how many servings of a food you are given. For example, a serving of cooked rice is ½ cup or about the size of half a baseball. Knowing serving sizes will help you be aware of how much food you are eating at restaurants. The list below tells you how big or small some common portion sizes are based on everyday objects:  ? 1 oz--4 stacked dice.  ? 3 oz--1 deck of cards.  ? 1 tsp--1 die.  ? 1 Tbsp--½ a ping-pong ball.  ? 2 Tbsp--1  ping-pong ball.  ? ½ cup--½ baseball.  ? 1 cup--1 baseball.  Summary  · Calorie counting means keeping track of how many calories you eat and drink each day. If you eat fewer calories than your body needs, you should lose weight.  · A healthy amount of weight to lose per week is usually 1-2 lb (0.5-0.9 kg). This usually means reducing your daily calorie intake by 500-750 calories.  · The number of calories in a food can be found on a Nutrition Facts label. If a food does not have a Nutrition Facts label, try to look up the calories online or ask your dietitian for help.  · Use your calories on foods and drinks that will fill you up, and not on foods and drinks that will leave you hungry.  · Use smaller plates, glasses, and bowls to prevent overeating.  This information is not intended to replace advice given to you by your health care provider. Make sure you discuss any questions you have with your health care provider.  Document Released: 12/18/2006 Document Revised: 11/17/2017 Document Reviewed: 11/17/2017  [employer] Patient Education © 2019 [employer]      Exercising to Lose Weight  Exercise is structured, repetitive physical activity to improve fitness and health. Getting regular exercise is important for everyone. It is especially important if you are overweight. Being overweight increases your risk of heart disease, stroke, diabetes, high blood pressure, and several types of cancer. Reducing your calorie intake and exercising can help you lose weight.  Exercise is usually categorized as moderate or vigorous intensity. To lose weight, most people need to do a certain amount of moderate-intensity or vigorous-intensity exercise each week.  Moderate-intensity exercise    Moderate-intensity exercise is any activity that gets you moving enough to burn at least three times more energy (calories) than if you were sitting.  Examples of moderate exercise include:  · Walking a mile in 15 minutes.  · Doing  light yard work.  · Biking at an easy pace.  Most people should get at least 150 minutes (2 hours and 30 minutes) a week of moderate-intensity exercise to maintain their body weight.  Vigorous-intensity exercise  Vigorous-intensity exercise is any activity that gets you moving enough to burn at least six times more calories than if you were sitting. When you exercise at this intensity, you should be working hard enough that you are not able to carry on a conversation.  Examples of vigorous exercise include:  · Running.  · Playing a team sport, such as football, basketball, and soccer.  · Jumping rope.  Most people should get at least 75 minutes (1 hour and 15 minutes) a week of vigorous-intensity exercise to maintain their body weight.  How can exercise affect me?  When you exercise enough to burn more calories than you eat, you lose weight. Exercise also reduces body fat and builds muscle. The more muscle you have, the more calories you burn. Exercise also:  · Improves mood.  · Reduces stress and tension.  · Improves your overall fitness, flexibility, and endurance.  · Increases bone strength.  The amount of exercise you need to lose weight depends on:  · Your age.  · The type of exercise.  · Any health conditions you have.  · Your overall physical ability.  Talk to your health care provider about how much exercise you need and what types of activities are safe for you.  What actions can I take to lose weight?  Nutrition    · Make changes to your diet as told by your health care provider or diet and nutrition specialist (dietitian). This may include:  ? Eating fewer calories.  ? Eating more protein.  ? Eating less unhealthy fats.  ? Eating a diet that includes fresh fruits and vegetables, whole grains, low-fat dairy products, and lean protein.  ? Avoiding foods with added fat, salt, and sugar.  · Drink plenty of water while you exercise to prevent dehydration or heat stroke.  Activity  · Choose an activity that you  enjoy and set realistic goals. Your health care provider can help you make an exercise plan that works for you.  · Exercise at a moderate or vigorous intensity most days of the week.  ? The intensity of exercise may vary from person to person. You can tell how intense a workout is for you by paying attention to your breathing and heartbeat. Most people will notice their breathing and heartbeat get faster with more intense exercise.  · Do resistance training twice each week, such as:  ? Push-ups.  ? Sit-ups.  ? Lifting weights.  ? Using resistance bands.  · Getting short amounts of exercise can be just as helpful as long structured periods of exercise. If you have trouble finding time to exercise, try to include exercise in your daily routine.  ? Get up, stretch, and walk around every 30 minutes throughout the day.  ? Go for a walk during your lunch break.  ? Park your car farther away from your destination.  ? If you take public transportation, get off one stop early and walk the rest of the way.  ? Make phone calls while standing up and walking around.  ? Take the stairs instead of elevators or escalators.  · Wear comfortable clothes and shoes with good support.  · Do not exercise so much that you hurt yourself, feel dizzy, or get very short of breath.  Where to find more information  · [place] Department of Health and Human Services: www.hhs.gov  · Centers for Disease Control and Prevention (CDC): www.cdc.gov  Contact a health care provider:  · Before starting a new exercise program.  · If you have questions or concerns about your weight.  · If you have a medical problem that keeps you from exercising.  Get help right away if you have any of the following while exercising:  · Injury.  · Dizziness.  · Difficulty breathing or shortness of breath that does not go away when you stop exercising.  · Chest pain.  · Rapid heartbeat.  Summary  · Being overweight increases your risk of heart disease, stroke, diabetes, high  blood pressure, and several types of cancer.  · Losing weight happens when you burn more calories than you eat.  · Reducing the amount of calories you eat in addition to getting regular moderate or vigorous exercise each week helps you lose weight.  This information is not intended to replace advice given to you by your health care provider. Make sure you discuss any questions you have with your health care provider.  Document Released: 01/20/2012 Document Revised: 12/31/2018 Document Reviewed: 12/31/2018  [employer] Interactive Patient Education © 2019 [employer]

## 2019-08-13 PROBLEM — E66.01 MORBIDLY OBESE (HCC): Status: ACTIVE | Noted: 2019-07-01

## 2019-08-13 NOTE — PROGRESS NOTES
The ABCs of the Annual Wellness Visit  Subsequent Medicare Wellness Visit    Chief Complaint   Patient presents with   • Annual Exam     Medicare Wellness Exam       Subjective   History of Present Illness:  Pita Hyatt is a 74 y.o. female who presents for a Subsequent Medicare Wellness Visit.    HEALTH RISK ASSESSMENT    Recent Hospitalizations:  No hospitalization(s) within the last year.    Current Medical Providers:  Patient Care Team:  Rose Marie Kelley APRN as PCP - General  NachoDaniel MD as PCP - Claims Attributed    Smoking Status:  Social History     Tobacco Use   Smoking Status Former Smoker   • Last attempt to quit:    • Years since quittin.6   Smokeless Tobacco Never Used       Alcohol Consumption:  Social History     Substance and Sexual Activity   Alcohol Use Yes    Comment: occasional       Depression Screen:   PHQ-2/PHQ-9 Depression Screening 2019   Little interest or pleasure in doing things 0   Feeling down, depressed, or hopeless 0   Total Score 0       Fall Risk Screen:  TOÑO Fall Risk Assessment was completed, and patient is at LOW risk for falls.Assessment completed on:2019    Health Habits and Functional and Cognitive Screening:  Functional & Cognitive Status 2019   Do you have difficulty preparing food and eating? No   Do you have difficulty bathing yourself, getting dressed or grooming yourself? No   Do you have difficulty using the toilet? No   Do you have difficulty moving around from place to place? No   Do you have trouble with steps or getting out of a bed or a chair? No   Current Diet Well Balanced Diet   Dental Exam Up to date   Eye Exam Up to date   Exercise (times per week) 5 times per week   Current Exercise Activities Include Walking   Do you need help using the phone?  No   Are you deaf or do you have serious difficulty hearing?  No   Do you need help with transportation? No   Do you need help shopping? No   Do you need help  preparing meals?  No   Do you need help with housework?  No   Do you need help with laundry? No   Do you need help taking your medications? No   Do you need help managing money? No   Do you ever drive or ride in a car without wearing a seat belt? No   Have you felt unusual stress, anger or loneliness in the last month? No   Who do you live with? Alone   If you need help, do you have trouble finding someone available to you? No   Have you been bothered in the last four weeks by sexual problems? No   Do you have difficulty concentrating, remembering or making decisions? No         Does the patient have evidence of cognitive impairment? No    Asprin use counseling:Does not need AS but is currently taking (discussed benefits vs risks and patient elects to stay on ASA)    Age-appropriate Screening Schedule:  Refer to the list below for future screening recommendations based on patient's age, sex and/or medical conditions. Orders for these recommended tests are listed in the plan section. The patient has been provided with a written plan.    Health Maintenance   Topic Date Due   • INFLUENZA VACCINE  08/31/2019 (Originally 8/1/2019)   • COLONOSCOPY  01/13/2027   • TDAP/TD VACCINES (2 - Td) 04/18/2027   • PNEUMOCOCCAL VACCINES (65+ LOW/MEDIUM RISK)  Discontinued   • MAMMOGRAM  Discontinued   • ZOSTER VACCINE  Discontinued          The following portions of the patient's history were reviewed and updated as appropriate: allergies, current medications, past family history, past medical history, past social history, past surgical history and problem list.    Outpatient Medications Prior to Visit   Medication Sig Dispense Refill   • cetirizine-pseudoephedrine (ZyrTEC-D) 5-120 MG per 12 hr tablet TAKE ONE TABLET BY MOUTH TWICE A DAY 60 tablet 2   • cycloSPORINE (RESTASIS) 0.05 % ophthalmic emulsion Apply 1 drop to eye Every 12 (Twelve) Hours. 5.5 mL 5   • fluticasone (FLONASE) 50 MCG/ACT nasal spray 2 sprays into each nostril  Daily.     • levothyroxine (SYNTHROID, LEVOTHROID) 100 MCG tablet Take 1 tablet by mouth Daily. 90 tablet 2   • Mirabegron ER (MYRBETRIQ) 50 MG tablet sustained-release 24 hour 24 hr tablet Take 50 mg by mouth Daily. 30 tablet 5   • omeprazole (priLOSEC) 20 MG capsule Take 20 mg by mouth Daily.     • ondansetron (ZOFRAN) 4 MG tablet Take 1 tablet by mouth 4 (Four) Times a Day As Needed for Nausea. 20 tablet 0   • thiamine (VITAMIN B-1) 100 MG tablet Take 100 mg by mouth Daily.     • traMADol (ULTRAM) 50 MG tablet Take 1 tablet by mouth Every 8 (Eight) Hours As Needed for Moderate Pain . 90 tablet 0   • traZODone (DESYREL) 50 MG tablet TAKE ONE TABLET BY MOUTH EVERY NIGHT 90 tablet 2   • vitamin D (ERGOCALCIFEROL) 83283 units capsule capsule TAKE ONE CAPSULE BY MOUTH ONCE WEEKLY 4 capsule 2   • Cetirizine-Pseudoephedrine (ZYRTEC-D PO) Take 1 tablet by mouth 2 (Two) Times a Day.       No facility-administered medications prior to visit.        Patient Active Problem List   Diagnosis   • Right shoulder pain   • Vitamin D deficiency   • Allergic rhinitis   • Hypothyroidism   • Skin tag   • Stress incontinence   • Acute recurrent maxillary sinusitis   • Dry eyes, bilateral   • Chronic maxillary sinusitis   • Nasal septal deformity   • Malaise and fatigue   • Acute recurrent pansinusitis   • Edema extremities   • Overactive bladder   • Obesity (BMI 30.0-34.9)   • Chronic right shoulder pain   • Precordial pain   • Morbidly obese (CMS/[facility])       Advanced Care Planning:  Patient has an advance directive - a copy has not been provided. Have asked the patient to send this to us to add to record    Review of Systems    Compared to one year ago, the patient feels her physical health is the same.  Compared to one year ago, the patient feels her mental health is the same.    Reviewed chart for potential of high risk medication in the elderly: yes  Reviewed chart for potential of harmful drug interactions in the  "elderly:yes    Objective         Vitals:    08/12/19 1349   BP: 130/72   BP Location: Right arm   Patient Position: Sitting   Cuff Size: Adult   Pulse: 83   Temp: 98.2 °F (36.8 °C)   TempSrc: Oral   SpO2: 99%   Weight: 105 kg (231 lb 12.8 oz)   Height: 170.2 cm (67\")   PainSc:   1   PainLoc: Generalized       Body mass index is 36.31 kg/m².  Discussed the patient's BMI with her. The BMI is above average; BMI management plan is completed.    Physical Exam    Lab Results   Component Value Date    HGBA1C 5.30 08/06/2019        Assessment/Plan   Medicare Risks and Personalized Health Plan  CMS Preventative Services Quick Reference  Fall Risk    The above risks/problems have been discussed with the patient.  Pertinent information has been shared with the patient in the After Visit Summary.  Follow up plans and orders are seen below in the Assessment/Plan Section.    Diagnoses and all orders for this visit:    1. Medicare annual wellness visit, initial (Primary)    2. Morbidly obese (CMS/[facility])      Follow Up:  Return in about 1 year (around 8/12/2020) for Medicare Wellness subsequent.     An After Visit Summary and PPPS were given to the patient.             "

## 2019-09-09 ENCOUNTER — OFFICE VISIT (OUTPATIENT)
Dept: CARDIOLOGY | Facility: CLINIC | Age: 74
End: 2019-09-09

## 2019-09-09 VITALS
BODY MASS INDEX: 36.43 KG/M2 | HEART RATE: 90 BPM | SYSTOLIC BLOOD PRESSURE: 138 MMHG | WEIGHT: 232.1 LBS | HEIGHT: 67 IN | OXYGEN SATURATION: 99 % | DIASTOLIC BLOOD PRESSURE: 80 MMHG

## 2019-09-09 DIAGNOSIS — E66.9 OBESITY (BMI 30.0-34.9): ICD-10-CM

## 2019-09-09 DIAGNOSIS — R07.2 PRECORDIAL PAIN: Primary | ICD-10-CM

## 2019-09-09 PROCEDURE — 99214 OFFICE O/P EST MOD 30 MIN: CPT | Performed by: INTERNAL MEDICINE

## 2019-09-09 RX ORDER — CETIRIZINE HYDROCHLORIDE 10 MG/1
10 TABLET ORAL DAILY
COMMUNITY
End: 2021-05-14

## 2019-09-09 NOTE — PROGRESS NOTES
Cardiovascular Medicine      Collins Aparicio M.D., Ph.D., [facility]               History of Present Illness  This is a 74 y.o. female with:    1. CPS, non-cardiac with low-risk DSE, 2019  2. Risks: Obese, HTN, former smoker     Pita Hyatt is a 74 y.o. female who returns for test results.  When I saw her she was having atypical chest pain.  She was elevated risk for obstructive coronary artery disease so she was sent for a DSE.  Fortunately, this was low risk.  She is also had no other episodes of resting, exertional or nocturnal angina.  She did have a systolic ejection murmur that was either from LVH or aortic stenosis.  2D echocardiogram shows no evidence of valvular disease. She thinks the tightness was more related to indigestion.     Review of Systems - Review of Systems   Cardiovascular: Negative for chest pain, cyanosis, dyspnea on exertion, irregular heartbeat, leg swelling and palpitations.   Respiratory: Negative.         All other systems were reviewed and were negative.    family history includes Cancer in her other; Diabetes in her other; Hypertension in her other; Sinusitis in her mother; Stroke in her father and other; Ulcers in her other.     reports that she quit smoking about 39 years ago. She has never used smokeless tobacco. She reports that she drinks alcohol. She reports that she does not use drugs.    Allergies   Allergen Reactions   • Phenergan [Promethazine] Itching         Current Outpatient Medications:   •  Cetirizine-Pseudoephedrine (ZYRTEC-D PO), Take 1 tablet by mouth 2 (Two) Times a Day., Disp: , Rfl:   •  cetirizine-pseudoephedrine (ZyrTEC-D) 5-120 MG per 12 hr tablet, TAKE ONE TABLET BY MOUTH TWICE A DAY, Disp: 60 tablet, Rfl: 2  •  cycloSPORINE (RESTASIS) 0.05 % ophthalmic emulsion, Apply 1 drop to eye Every 12 (Twelve) Hours., Disp: 5.5 mL, Rfl: 5  •  fluticasone (FLONASE) 50 MCG/ACT nasal spray, 2 sprays into each nostril Daily., Disp: , Rfl:   •  levothyroxine  "(SYNTHROID, LEVOTHROID) 100 MCG tablet, Take 1 tablet by mouth Daily., Disp: 90 tablet, Rfl: 2  •  Mirabegron ER (MYRBETRIQ) 50 MG tablet sustained-release 24 hour 24 hr tablet, Take 50 mg by mouth Daily., Disp: 30 tablet, Rfl: 5  •  omeprazole (priLOSEC) 20 MG capsule, Take 20 mg by mouth Daily., Disp: , Rfl:   •  ondansetron (ZOFRAN) 4 MG tablet, Take 1 tablet by mouth 4 (Four) Times a Day As Needed for Nausea., Disp: 20 tablet, Rfl: 0  •  thiamine (VITAMIN B-1) 100 MG tablet, Take 100 mg by mouth Daily., Disp: , Rfl:   •  traMADol (ULTRAM) 50 MG tablet, Take 1 tablet by mouth Every 8 (Eight) Hours As Needed for Moderate Pain ., Disp: 90 tablet, Rfl: 0  •  traZODone (DESYREL) 50 MG tablet, TAKE ONE TABLET BY MOUTH EVERY NIGHT, Disp: 90 tablet, Rfl: 2  •  vitamin D (ERGOCALCIFEROL) 45956 units capsule capsule, TAKE ONE CAPSULE BY MOUTH ONCE WEEKLY, Disp: 4 capsule, Rfl: 2      Physical Exam:  Vitals:    09/09/19 1424   BP: 138/80   BP Location: Right arm   Patient Position: Sitting   Cuff Size: Adult   Pulse: 90   SpO2: 99%   Weight: 105 kg (232 lb 1.6 oz)   Height: 170.2 cm (67\")   PainSc: 0-No pain   Body mass index is 36.35 kg/m².    Current Pain Level: none  Pulse Ox: Normal  on room air  General: alert, appears stated age and cooperative     Body Habitus: overweight    HEENT: Head: Normocephalic, no lesions, without obvious abnormality. No arcus senilis, xanthelasma or xanthomas.    Pulses: 2+ and symmetric  JVP: Volume/Pulsation: Normal.  Normal waveforms.   Appropriate inspiratory decrease.  No Kussmaul's. No Lise's.   RV Heave: absent  LV Heave: absent  [place]:  normal size and placement  Palpable S4: absent.  Heart rate: normal    Heart Rhythm: regular     Heart Sounds: S1: normal intensity  S2: normal intensity  S3: absent   S4: absent  Opening Snap: absent    A2-OS:  no  Pericardial Rub:  Absent   Ejection click: None      Murmurs:  present - II/VI JARED   Extremity: moves all extremities equally. "       DATA REVIEWED:     Results for orders placed in visit on 07/01/19   Adult Stress Echo W/ Cont or Stress Agent if Necessary Per Protocol    Narrative · Dobutamine stress echocardiogram with atropine  · Resting left ventricle ejection fraction is normal and estimated at 56   to 60% without regional wall motion abnormalities.  · Target heart rate achieved. Stress ECG without ischemic changes.  · Stress left ventricle ejection fraction greater than 70% without   regional wall motion abnormalities.  · Normal stress echo with no significant echocardiographic evidence for   myocardial ischemia.              Lab Results   Component Value Date    GLUCOSE 107 (H) 10/11/2018    BUN 13 10/11/2018    CREATININE 0.70 10/11/2018    EGFRIFNONA 82 10/11/2018    BCR 18.6 10/11/2018    K 4.4 10/11/2018    CO2 24.0 10/11/2018    CALCIUM 9.9 10/11/2018    ALBUMIN 3.00 (L) 10/11/2018    AST 89 (H) 10/11/2018    ALT 59 (H) 10/11/2018     Lab Results   Component Value Date    WBC 4.35 06/08/2018    HGB 12.7 06/08/2018    HCT 39.9 06/08/2018    MCV 91.7 06/08/2018     06/08/2018     Lab Results   Component Value Date    TRIG 116 04/11/2017     Lab Results   Component Value Date    TSH 3.700 08/06/2019     No results found for: CKTOTAL, CKMB, CKMBINDEX, TROPONINI, TROPONINT  Lab Results   Component Value Date    HGBA1C 5.30 08/06/2019     No results found for: DDIMER  Lab Results   Component Value Date    ALT 59 (H) 10/11/2018     Lab Results   Component Value Date    HGBA1C 5.30 08/06/2019    HGBA1C 5.6 09/12/2017    HGBA1C 5.3 11/03/2014     Lab Results   Component Value Date    CREATININE 0.70 10/11/2018     Lab Results   Component Value Date    IRON 81 07/25/2016    TIBC 357 07/25/2016     Lab Results   Component Value Date    INR 1.19 05/25/2018    INR 1.15 09/12/2017    INR 1.15 04/11/2017    PROTIME 14.8 05/25/2018    PROTIME 14.6 09/12/2017    PROTIME 14.7 04/11/2017       Assessment/Plan     1. Precordial pain.  The  "results of her low risk DSE were conveyed to her today in a way that she is able to understand the negative predictive value of the test.  She can return to her primary care provider for management of her medical comorbidities.  I have asked her to contact me for any concerning chest pain issues.    2. Cardiac Risk Assessment:Dyslipidemia/BP Goals//Diet/Current Weight/Tobacco status/Screening:   -Continue follow-up visits with PCP for monitoring of labs; Dietary changes: Increase soluble fiber: A printed copy of a low fat, low cholesterol diet was given; Reduce saturated fat, \"trans\" monounsaturated fatty acids, and cholesterol; Exercise changes:  Encouraged daily aerobic activity.  -HTN is a significant risk factor for stroke, heart disease and vascular disease. I've recommended the patient continue current medications, if any, as prescribed by the primary care provider. I recommended they have close follow-up for ongoing mgmt of this and the medical comorbidities associated with HTN with their PCP.  -HTN hand-out    General patient education regarding weight:   The patient's BMI is Body mass index is 36.35 kg/m²..  This places the patient in weight class:  Obese Class II: 35-39.9kg/m2.   -Weight loss hand-out  -Exercise intervention:   Hand out, increase aerobic activity  -Close PCP follow-up for Body mass index is 36.35 kg/m²..     Former tobacco exposure:   -Congratulated on cessation  -Avoid tobacco          Pita Hyatt is discharged from my care. No further follow-up in my office. The patient is to return to the care of their PCP.       "

## 2019-09-09 NOTE — PATIENT INSTRUCTIONS
Preventing Unhealthy Weight Gain, Adult  Staying at a healthy weight is important to your overall health. When fat builds up in your body, you may become overweight or obese. Being overweight or obese increases your risk of developing certain health problems, such as heart disease, diabetes, sleeping problems, joint problems, and some types of cancer.  Unhealthy weight gain is often the result of making unhealthy food choices or not getting enough exercise. You can make changes to your lifestyle to prevent obesity and stay as healthy as possible.  What nutrition changes can be made?    · Eat only as much as your body needs. To do this:  ? Pay attention to signs that you are hungry or full. Stop eating as soon as you feel full.  ? If you feel hungry, try drinking water first before eating. Drink enough water so your urine is clear or pale yellow.  ? Eat smaller portions. Pay attention to portion sizes when eating out.  ? Look at serving sizes on food labels. Most foods contain more than one serving per container.  ? Eat the recommended number of calories for your gender and activity level. For most active people, a daily total of 2,000 calories is appropriate. If you are trying to lose weight or are not very active, you may need to eat fewer calories. Talk with your health care provider or a diet and nutrition specialist (dietitian) about how many calories you need each day.  · Choose healthy foods, such as:  ? Fruits and vegetables. At each meal, try to fill at least half of your plate with fruits and vegetables.  ? Whole grains, such as whole-wheat bread, brown rice, and quinoa.  ? Lean meats, such as chicken or fish.  ? Other healthy proteins, such as beans, eggs, or tofu.  ? Healthy fats, such as nuts, seeds, fatty fish, and olive oil.  ? Low-fat or fat-free dairy products.  · Check food labels, and avoid food and drinks that:  ? Are high in calories.  ? Have added sugar.  ? Are high in sodium.  ? Have saturated  fats or trans fats.  · Cook foods in healthier ways, such as by baking, broiling, or grilling.  · Make a meal plan for the week, and shop with a grocery list to help you stay on track with your purchases. Try to avoid going to the grocery store when you are hungry.  · When grocery shopping, try to shop around the outside of the store first, where the fresh foods are. Doing this helps you to avoid prepackaged foods, which can be high in sugar, salt (sodium), and fat.  What lifestyle changes can be made?    · Exercise for 30 or more minutes on 5 or more days each week. Exercising may include brisk walking, yard work, biking, running, swimming, and team sports like basketball and soccer. Ask your health care provider which exercises are safe for you.  · Do muscle-strengthening activities, such as lifting weights or using resistance bands, on 2 or more days a week.  · Do not use any products that contain nicotine or tobacco, such as cigarettes and e-cigarettes. If you need help quitting, ask your health care provider.  · Limit alcohol intake to no more than 1 drink a day for nonpregnant women and 2 drinks a day for men. One drink equals 12 oz of beer, 5 oz of wine, or 1½ oz of hard liquor.  · Try to get 7-9 hours of sleep each night.  What other changes can be made?  · Keep a food and activity journal to keep track of:  ? What you ate and how many calories you had. Remember to count the calories in sauces, dressings, and side dishes.  ? Whether you were active, and what exercises you did.  ? Your calorie, weight, and activity goals.  · Check your weight regularly. Track any changes. If you notice you have gained weight, make changes to your diet or activity routine.  · Avoid taking weight-loss medicines or supplements. Talk to your health care provider before starting any new medicine or supplement.  · Talk to your health care provider before trying any new diet or exercise plan.  Why are these changes  important?  Eating healthy, staying active, and having healthy habits can help you to prevent obesity. Those changes also:  · Help you manage stress and emotions.  · Help you connect with friends and family.  · Improve your self-esteem.  · Improve your sleep.  · Prevent long-term health problems.  What can happen if changes are not made?  Being obese or overweight can cause you to develop joint or bone problems, which can make it hard for you to stay active or do activities you enjoy. Being obese or overweight also puts stress on your heart and lungs and can lead to health problems like diabetes, heart disease, and some cancers.  Where to find more information  Talk with your health care provider or a dietitian about healthy eating and healthy lifestyle choices. You may also find information from:  · [place]. Department of Agriculture, MyPlate: www.gaytravel.commyplate.gov  · American Heart Association: www.heart.org  · Centers for Disease Control and Prevention: www.cdc.gov  Summary  · Staying at a healthy weight is important to your overall health. It helps you to prevent certain diseases and health problems, such as heart disease, diabetes, joint problems, sleep disorders, and some types of cancer.  · Being obese or overweight can cause you to develop joint or bone problems, which can make it hard for you to stay active or do activities you enjoy.  · You can prevent unhealthy weight gain by eating a healthy diet, exercising regularly, not smoking, limiting alcohol, and getting enough sleep.  · Talk with your health care provider or a dietitian for guidance about healthy eating and healthy lifestyle choices.  This information is not intended to replace advice given to you by your health care provider. Make sure you discuss any questions you have with your health care provider.  Document Released: 12/19/2017 Document Revised: 09/28/2018 Document Reviewed: 01/24/2018  [employer] Interactive Patient Education © 2019 [employer]

## 2019-10-13 DIAGNOSIS — E03.9 HYPOTHYROIDISM, UNSPECIFIED TYPE: ICD-10-CM

## 2019-10-14 RX ORDER — LEVOTHYROXINE SODIUM 0.1 MG/1
TABLET ORAL
Qty: 90 TABLET | Refills: 1 | Status: SHIPPED | OUTPATIENT
Start: 2019-10-14 | End: 2020-04-27

## 2019-11-05 ENCOUNTER — OFFICE VISIT (OUTPATIENT)
Dept: FAMILY MEDICINE CLINIC | Facility: CLINIC | Age: 74
End: 2019-11-05

## 2019-11-05 VITALS
SYSTOLIC BLOOD PRESSURE: 141 MMHG | WEIGHT: 232 LBS | DIASTOLIC BLOOD PRESSURE: 69 MMHG | BODY MASS INDEX: 36.41 KG/M2 | HEART RATE: 80 BPM | OXYGEN SATURATION: 98 % | RESPIRATION RATE: 20 BRPM | TEMPERATURE: 98.1 F | HEIGHT: 67 IN

## 2019-11-05 DIAGNOSIS — E55.9 VITAMIN D DEFICIENCY: ICD-10-CM

## 2019-11-05 DIAGNOSIS — N39.3 STRESS INCONTINENCE: ICD-10-CM

## 2019-11-05 DIAGNOSIS — I10 ESSENTIAL HYPERTENSION: ICD-10-CM

## 2019-11-05 DIAGNOSIS — E66.09 CLASS 2 OBESITY DUE TO EXCESS CALORIES WITHOUT SERIOUS COMORBIDITY WITH BODY MASS INDEX (BMI) OF 36.0 TO 36.9 IN ADULT: ICD-10-CM

## 2019-11-05 DIAGNOSIS — E03.9 HYPOTHYROIDISM, UNSPECIFIED TYPE: Primary | ICD-10-CM

## 2019-11-05 PROCEDURE — 99214 OFFICE O/P EST MOD 30 MIN: CPT | Performed by: NURSE PRACTITIONER

## 2019-11-05 RX ORDER — ERGOCALCIFEROL 1.25 MG/1
50000 CAPSULE ORAL WEEKLY
Qty: 4 CAPSULE | Refills: 2 | Status: SHIPPED | OUTPATIENT
Start: 2019-11-05 | End: 2020-04-30

## 2019-11-05 RX ORDER — OXYBUTYNIN CHLORIDE 5 MG/1
5 TABLET ORAL 3 TIMES DAILY
Qty: 90 TABLET | Refills: 3 | Status: SHIPPED | OUTPATIENT
Start: 2019-11-05 | End: 2020-06-19

## 2019-11-05 NOTE — PROGRESS NOTES
Subjective   Pita Hyatt is a 74 y.o. female.     Here today for josephine on her hypothyroidism.  B/p is well controlled.        Hypothyroidism   This is a chronic problem. The problem occurs constantly. The problem has been unchanged. Pertinent negatives include no abdominal pain, anorexia, arthralgias, change in bowel habit, chest pain, chills, congestion, coughing, diaphoresis, headaches, myalgias, nausea, neck pain, numbness, sore throat, swollen glands, urinary symptoms, vertigo, visual change, vomiting or weakness. Nothing aggravates the symptoms. Treatments tried: levothyroxine. The treatment provided significant relief.   Hypertension   This is a chronic problem. The problem is unchanged. The problem is controlled. Pertinent negatives include no anxiety, blurred vision, chest pain, headaches, malaise/fatigue, neck pain, orthopnea, palpitations, peripheral edema, PND, shortness of breath or sweats. Agents associated with hypertension include thyroid hormones and decongestants. Risk factors for coronary artery disease include obesity, sedentary lifestyle, post-menopausal state and smoking/tobacco exposure. Past treatments include lifestyle changes. Current antihypertension treatment includes lifestyle changes. The current treatment provides significant improvement. There are no compliance problems.  There is no history of angina, kidney disease, CAD/MI, CVA, heart failure, left ventricular hypertrophy, PVD or retinopathy.        The following portions of the patient's history were reviewed and updated as appropriate: allergies, current medications, past family history, past medical history, past social history, past surgical history and problem list.    Review of Systems   Constitutional: Negative.  Negative for chills, diaphoresis and malaise/fatigue.   HENT: Negative.  Negative for congestion, sore throat and swollen glands.    Eyes: Negative.  Negative for blurred vision.   Respiratory: Negative.   Negative for cough and shortness of breath.    Cardiovascular: Negative.  Negative for chest pain, palpitations, orthopnea and PND.   Gastrointestinal: Negative.  Negative for abdominal pain, anorexia, change in bowel habit, nausea and vomiting.   Endocrine: Negative.    Genitourinary: Negative.    Musculoskeletal: Negative.  Negative for arthralgias, myalgias and neck pain.   Skin: Negative.    Allergic/Immunologic: Negative.    Neurological: Negative.  Negative for vertigo, weakness and numbness.   Hematological: Negative.    Psychiatric/Behavioral: Negative.        Objective   Physical Exam   Constitutional: She is oriented to person, place, and time. She appears well-developed and well-nourished. No distress.   HENT:   Head: Normocephalic and atraumatic.   Mouth/Throat: No oropharyngeal exudate.   Eyes: Pupils are equal, round, and reactive to light.   Neck: Normal range of motion. Neck supple. No thyromegaly present.   Cardiovascular: Normal rate, regular rhythm and normal heart sounds. Exam reveals no friction rub.   No murmur heard.  Pulmonary/Chest: Effort normal and breath sounds normal. No respiratory distress. She has no wheezes. She has no rales.   Abdominal: Soft.   Musculoskeletal: Normal range of motion.   Neurological: She is alert and oriented to person, place, and time.   Skin: Skin is warm and dry.   Psychiatric: She has a normal mood and affect. Thought content normal.   Nursing note and vitals reviewed.        Assessment/Plan   [place] was seen today for heartburn, hypothyroidism and arthritis.    Diagnoses and all orders for this visit:    Hypothyroidism, unspecified type    Stress incontinence  -     oxybutynin (DITROPAN) 5 MG tablet; Take 1 tablet by mouth 3 (Three) Times a Day.    Vitamin D deficiency  -     vitamin D (ERGOCALCIFEROL) 1.25 MG (75238 UT) capsule capsule; Take 1 capsule by mouth 1 (One) Time Per Week.    Essential hypertension    Class 2 obesity due to excess calories without  serious comorbidity with body mass index (BMI) of 36.0 to 36.9 in adult  Comments:  diet and exercise info given

## 2019-11-14 NOTE — PATIENT INSTRUCTIONS
Calorie Counting for Weight Loss  Calories are units of energy. Your body needs a certain amount of calories from food to keep you going throughout the day. When you eat more calories than your body needs, your body stores the extra calories as fat. When you eat fewer calories than your body needs, your body burns fat to get the energy it needs.  Calorie counting means keeping track of how many calories you eat and drink each day. Calorie counting can be helpful if you need to lose weight. If you make sure to eat fewer calories than your body needs, you should lose weight. Ask your health care provider what a healthy weight is for you.  For calorie counting to work, you will need to eat the right number of calories in a day in order to lose a healthy amount of weight per week. A dietitian can help you determine how many calories you need in a day and will give you suggestions on how to reach your calorie goal.  · A healthy amount of weight to lose per week is usually 1-2 lb (0.5-0.9 kg). This usually means that your daily calorie intake should be reduced by 500-750 calories.  · Eating 1,200 - 1,500 calories per day can help most women lose weight.  · Eating 1,500 - 1,800 calories per day can help most men lose weight.  What is my plan?  My goal is to have __________ calories per day.  If I have this many calories per day, I should lose around __________ pounds per week.  What do I need to know about calorie counting?  In order to meet your daily calorie goal, you will need to:  · Find out how many calories are in each food you would like to eat. Try to do this before you eat.  · Decide how much of the food you plan to eat.  · Write down what you ate and how many calories it had. Doing this is called keeping a food log.  To successfully lose weight, it is important to balance calorie counting with a healthy lifestyle that includes regular activity. Aim for 150 minutes of moderate exercise (such as walking) or 75  minutes of vigorous exercise (such as running) each week.  Where do I find calorie information?    The number of calories in a food can be found on a Nutrition Facts label. If a food does not have a Nutrition Facts label, try to look up the calories online or ask your dietitian for help.  Remember that calories are listed per serving. If you choose to have more than one serving of a food, you will have to multiply the calories per serving by the amount of servings you plan to eat. For example, the label on a package of bread might say that a serving size is 1 slice and that there are 90 calories in a serving. If you eat 1 slice, you will have eaten 90 calories. If you eat 2 slices, you will have eaten 180 calories.  How do I keep a food log?  Immediately after each meal, record the following information in your food log:  · What you ate. Don't forget to include toppings, sauces, and other extras on the food.  · How much you ate. This can be measured in cups, ounces, or number of items.  · How many calories each food and drink had.  · The total number of calories in the meal.  Keep your food log near you, such as in a small notebook in your pocket, or use a mobile miguel or website. Some programs will calculate calories for you and show you how many calories you have left for the day to meet your goal.  What are some calorie counting tips?    · Use your calories on foods and drinks that will fill you up and not leave you hungry:  ? Some examples of foods that fill you up are nuts and nut butters, vegetables, lean proteins, and high-fiber foods like whole grains. High-fiber foods are foods with more than 5 g fiber per serving.  ? Drinks such as sodas, specialty coffee drinks, alcohol, and juices have a lot of calories, yet do not fill you up.  · Eat nutritious foods and avoid empty calories. Empty calories are calories you get from foods or beverages that do not have many vitamins or protein, such as candy, sweets, and  "soda. It is better to have a nutritious high-calorie food (such as an avocado) than a food with few nutrients (such as a bag of chips).  · Know how many calories are in the foods you eat most often. This will help you calculate calorie counts faster.  · Pay attention to calories in drinks. Low-calorie drinks include water and unsweetened drinks.  · Pay attention to nutrition labels for \"low fat\" or \"fat free\" foods. These foods sometimes have the same amount of calories or more calories than the full fat versions. They also often have added sugar, starch, or salt, to make up for flavor that was removed with the fat.  · Find a way of tracking calories that works for you. Get creative. Try different apps or programs if writing down calories does not work for you.  What are some portion control tips?  · Know how many calories are in a serving. This will help you know how many servings of a certain food you can have.  · Use a measuring cup to measure serving sizes. You could also try weighing out portions on a kitchen scale. With time, you will be able to estimate serving sizes for some foods.  · Take some time to put servings of different foods on your favorite plates, bowls, and cups so you know what a serving looks like.  · Try not to eat straight from a bag or box. Doing this can lead to overeating. Put the amount you would like to eat in a cup or on a plate to make sure you are eating the right portion.  · Use smaller plates, glasses, and bowls to prevent overeating.  · Try not to multitask (for example, watch TV or use your computer) while eating. If it is time to eat, sit down at a table and enjoy your food. This will help you to know when you are full. It will also help you to be aware of what you are eating and how much you are eating.  What are tips for following this plan?  Reading food labels  · Check the calorie count compared to the serving size. The serving size may be smaller than what you are used to " "eating.  · Check the source of the calories. Make sure the food you are eating is high in vitamins and protein and low in saturated and trans fats.  Shopping  · Read nutrition labels while you shop. This will help you make healthy decisions before you decide to purchase your food.  · Make a grocery list and stick to it.  Cooking  · Try to cook your favorite foods in a healthier way. For example, try baking instead of frying.  · Use low-fat dairy products.  Meal planning  · Use more fruits and vegetables. Half of your plate should be fruits and vegetables.  · Include lean proteins like poultry and fish.  How do I count calories when eating out?  · Ask for smaller portion sizes.  · Consider sharing an entree and sides instead of getting your own entree.  · If you get your own entree, eat only half. Ask for a box at the beginning of your meal and put the rest of your entree in it so you are not tempted to eat it.  · If calories are listed on the menu, choose the lower calorie options.  · Choose dishes that include vegetables, fruits, whole grains, low-fat dairy products, and lean protein.  · Choose items that are boiled, broiled, grilled, or steamed. Stay away from items that are buttered, battered, fried, or served with cream sauce. Items labeled \"crispy\" are usually fried, unless stated otherwise.  · Choose water, low-fat milk, unsweetened iced tea, or other drinks without added sugar. If you want an alcoholic beverage, choose a lower calorie option such as a glass of wine or light beer.  · Ask for dressings, sauces, and syrups on the side. These are usually high in calories, so you should limit the amount you eat.  · If you want a salad, choose a garden salad and ask for grilled meats. Avoid extra toppings like gregory, cheese, or fried items. Ask for the dressing on the side, or ask for olive oil and vinegar or lemon to use as dressing.  · Estimate how many servings of a food you are given. For example, a serving of " cooked rice is ½ cup or about the size of half a baseball. Knowing serving sizes will help you be aware of how much food you are eating at restaurants. The list below tells you how big or small some common portion sizes are based on everyday objects:  ? 1 oz--4 stacked dice.  ? 3 oz--1 deck of cards.  ? 1 tsp--1 die.  ? 1 Tbsp--½ a ping-pong ball.  ? 2 Tbsp--1 ping-pong ball.  ? ½ cup--½ baseball.  ? 1 cup--1 baseball.  Summary  · Calorie counting means keeping track of how many calories you eat and drink each day. If you eat fewer calories than your body needs, you should lose weight.  · A healthy amount of weight to lose per week is usually 1-2 lb (0.5-0.9 kg). This usually means reducing your daily calorie intake by 500-750 calories.  · The number of calories in a food can be found on a Nutrition Facts label. If a food does not have a Nutrition Facts label, try to look up the calories online or ask your dietitian for help.  · Use your calories on foods and drinks that will fill you up, and not on foods and drinks that will leave you hungry.  · Use smaller plates, glasses, and bowls to prevent overeating.  This information is not intended to replace advice given to you by your health care provider. Make sure you discuss any questions you have with your health care provider.  Document Released: 12/18/2006 Document Revised: 09/06/2019 Document Reviewed: 11/17/2017  [employer] Interactive Patient Education © 2019 [employer]      Exercising to Lose Weight  Exercise is structured, repetitive physical activity to improve fitness and health. Getting regular exercise is important for everyone. It is especially important if you are overweight. Being overweight increases your risk of heart disease, stroke, diabetes, high blood pressure, and several types of cancer. Reducing your calorie intake and exercising can help you lose weight.  Exercise is usually categorized as moderate or vigorous intensity. To lose weight, most  people need to do a certain amount of moderate-intensity or vigorous-intensity exercise each week.  Moderate-intensity exercise    Moderate-intensity exercise is any activity that gets you moving enough to burn at least three times more energy (calories) than if you were sitting.  Examples of moderate exercise include:  · Walking a mile in 15 minutes.  · Doing light yard work.  · Biking at an easy pace.  Most people should get at least 150 minutes (2 hours and 30 minutes) a week of moderate-intensity exercise to maintain their body weight.  Vigorous-intensity exercise  Vigorous-intensity exercise is any activity that gets you moving enough to burn at least six times more calories than if you were sitting. When you exercise at this intensity, you should be working hard enough that you are not able to carry on a conversation.  Examples of vigorous exercise include:  · Running.  · Playing a team sport, such as football, basketball, and soccer.  · Jumping rope.  Most people should get at least 75 minutes (1 hour and 15 minutes) a week of vigorous-intensity exercise to maintain their body weight.  How can exercise affect me?  When you exercise enough to burn more calories than you eat, you lose weight. Exercise also reduces body fat and builds muscle. The more muscle you have, the more calories you burn. Exercise also:  · Improves mood.  · Reduces stress and tension.  · Improves your overall fitness, flexibility, and endurance.  · Increases bone strength.  The amount of exercise you need to lose weight depends on:  · Your age.  · The type of exercise.  · Any health conditions you have.  · Your overall physical ability.  Talk to your health care provider about how much exercise you need and what types of activities are safe for you.  What actions can I take to lose weight?  Nutrition    · Make changes to your diet as told by your health care provider or diet and nutrition specialist (dietitian). This may  include:  ? Eating fewer calories.  ? Eating more protein.  ? Eating less unhealthy fats.  ? Eating a diet that includes fresh fruits and vegetables, whole grains, low-fat dairy products, and lean protein.  ? Avoiding foods with added fat, salt, and sugar.  · Drink plenty of water while you exercise to prevent dehydration or heat stroke.  Activity  · Choose an activity that you enjoy and set realistic goals. Your health care provider can help you make an exercise plan that works for you.  · Exercise at a moderate or vigorous intensity most days of the week.  ? The intensity of exercise may vary from person to person. You can tell how intense a workout is for you by paying attention to your breathing and heartbeat. Most people will notice their breathing and heartbeat get faster with more intense exercise.  · Do resistance training twice each week, such as:  ? Push-ups.  ? Sit-ups.  ? Lifting weights.  ? Using resistance bands.  · Getting short amounts of exercise can be just as helpful as long structured periods of exercise. If you have trouble finding time to exercise, try to include exercise in your daily routine.  ? Get up, stretch, and walk around every 30 minutes throughout the day.  ? Go for a walk during your lunch break.  ? Park your car farther away from your destination.  ? If you take public transportation, get off one stop early and walk the rest of the way.  ? Make phone calls while standing up and walking around.  ? Take the stairs instead of elevators or escalators.  · Wear comfortable clothes and shoes with good support.  · Do not exercise so much that you hurt yourself, feel dizzy, or get very short of breath.  Where to find more information  · [place] Department of Health and Human Services: www.hhs.gov  · Centers for Disease Control and Prevention (CDC): www.cdc.gov  Contact a health care provider:  · Before starting a new exercise program.  · If you have questions or concerns about your  weight.  · If you have a medical problem that keeps you from exercising.  Get help right away if you have any of the following while exercising:  · Injury.  · Dizziness.  · Difficulty breathing or shortness of breath that does not go away when you stop exercising.  · Chest pain.  · Rapid heartbeat.  Summary  · Being overweight increases your risk of heart disease, stroke, diabetes, high blood pressure, and several types of cancer.  · Losing weight happens when you burn more calories than you eat.  · Reducing the amount of calories you eat in addition to getting regular moderate or vigorous exercise each week helps you lose weight.  This information is not intended to replace advice given to you by your health care provider. Make sure you discuss any questions you have with your health care provider.  Document Released: 01/20/2012 Document Revised: 12/31/2018 Document Reviewed: 12/31/2018  [employer] Interactive Patient Education © 2019 [employer]

## 2020-02-04 ENCOUNTER — OFFICE VISIT (OUTPATIENT)
Dept: FAMILY MEDICINE CLINIC | Facility: CLINIC | Age: 75
End: 2020-02-04

## 2020-02-04 VITALS
SYSTOLIC BLOOD PRESSURE: 133 MMHG | RESPIRATION RATE: 18 BRPM | HEIGHT: 67 IN | OXYGEN SATURATION: 97 % | BODY MASS INDEX: 36.26 KG/M2 | DIASTOLIC BLOOD PRESSURE: 68 MMHG | WEIGHT: 231 LBS | TEMPERATURE: 98.1 F | HEART RATE: 81 BPM

## 2020-02-04 DIAGNOSIS — E03.9 HYPOTHYROIDISM, UNSPECIFIED TYPE: Primary | ICD-10-CM

## 2020-02-04 DIAGNOSIS — R06.02 SHORTNESS OF BREATH: ICD-10-CM

## 2020-02-04 DIAGNOSIS — Z13.1 SCREENING FOR DIABETES MELLITUS: ICD-10-CM

## 2020-02-04 DIAGNOSIS — H04.123 DRY EYES, BILATERAL: ICD-10-CM

## 2020-02-04 DIAGNOSIS — E74.89 OTHER SPECIFIED DISORDERS OF CARBOHYDRATE METABOLISM (HCC): ICD-10-CM

## 2020-02-04 DIAGNOSIS — E66.01 MORBID (SEVERE) OBESITY DUE TO EXCESS CALORIES (HCC): ICD-10-CM

## 2020-02-04 DIAGNOSIS — Z13.220 SCREENING FOR LIPID DISORDERS: ICD-10-CM

## 2020-02-04 PROCEDURE — 99214 OFFICE O/P EST MOD 30 MIN: CPT | Performed by: NURSE PRACTITIONER

## 2020-02-04 RX ORDER — CYCLOSPORINE 0.5 MG/ML
1 EMULSION OPHTHALMIC EVERY 12 HOURS
Qty: 5.5 ML | Refills: 5 | Status: SHIPPED | OUTPATIENT
Start: 2020-02-04 | End: 2020-06-19 | Stop reason: SDUPTHER

## 2020-02-04 RX ORDER — ALBUTEROL SULFATE 90 UG/1
2 AEROSOL, METERED RESPIRATORY (INHALATION) EVERY 4 HOURS PRN
Qty: 18 G | Refills: 3 | Status: SHIPPED | OUTPATIENT
Start: 2020-02-04 | End: 2020-07-09

## 2020-02-13 PROBLEM — E74.89 OTHER SPECIFIED DISORDERS OF CARBOHYDRATE METABOLISM: Status: ACTIVE | Noted: 2020-02-13

## 2020-02-13 NOTE — PROGRESS NOTES
Subjective   Pita Hyatt is a 74 y.o. female.     Here today for josephine.  She needs to get refills on some of her maintenance meds.    Hypothyroidism   This is a chronic problem. The current episode started more than 1 year ago. The problem occurs constantly. The problem has been unchanged. Associated symptoms include arthralgias. Pertinent negatives include no abdominal pain, anorexia, change in bowel habit, chest pain, chills, congestion, coughing, diaphoresis, headaches, myalgias, nausea, neck pain, numbness, sore throat, swollen glands, urinary symptoms, vertigo, visual change, vomiting or weakness. Nothing aggravates the symptoms. Treatments tried: levothyroxine. The treatment provided mild relief.        The following portions of the patient's history were reviewed and updated as appropriate: allergies, current medications, past family history, past medical history, past social history, past surgical history and problem list.    Review of Systems   Constitutional: Negative.  Negative for chills and diaphoresis.   HENT: Negative.  Negative for congestion, sore throat and swollen glands.    Eyes: Negative.    Respiratory: Negative.  Negative for cough.    Cardiovascular: Negative.  Negative for chest pain.   Gastrointestinal: Negative.  Negative for abdominal pain, anorexia, change in bowel habit, nausea and vomiting.   Endocrine: Negative.    Genitourinary: Negative.    Musculoskeletal: Positive for arthralgias. Negative for myalgias and neck pain.   Skin: Negative.    Allergic/Immunologic: Negative.    Neurological: Negative.  Negative for vertigo, weakness and numbness.   Hematological: Negative.    Psychiatric/Behavioral: Negative.        Objective   Physical Exam   Constitutional: She is oriented to person, place, and time. She appears well-developed and well-nourished. No distress.   HENT:   Head: Normocephalic and atraumatic.   Right Ear: External ear normal.   Left Ear: External ear normal.   Nose:  Nose normal.   Mouth/Throat: Oropharynx is clear and moist. No oropharyngeal exudate.   Eyes: Pupils are equal, round, and reactive to light.   Neck: Normal range of motion. Neck supple. No thyromegaly present.   Cardiovascular: Normal rate, regular rhythm and normal heart sounds. Exam reveals no friction rub.   No murmur heard.  Pulmonary/Chest: Effort normal and breath sounds normal. No respiratory distress. She has no wheezes. She has no rales.   Abdominal: Soft.   Musculoskeletal: Normal range of motion.   Neurological: She is alert and oriented to person, place, and time.   Skin: Skin is warm and dry.   Psychiatric: She has a normal mood and affect. Thought content normal.   Nursing note and vitals reviewed.        Assessment/Plan   [place] was seen today for hypothyroidism, heartburn and arthritis.    Diagnoses and all orders for this visit:    Hypothyroidism, unspecified type  -     TSH    Dry eyes, bilateral  -     cycloSPORINE (RESTASIS) 0.05 % ophthalmic emulsion; Apply 1 drop to eye(s) as directed by provider Every 12 (Twelve) Hours.    Shortness of breath  -     albuterol sulfate  (90 Base) MCG/ACT inhaler; Inhale 2 puffs Every 4 (Four) Hours As Needed for Wheezing.    Screening for lipid disorders  -     Lipid Panel; Future    Screening for diabetes mellitus  -     Comprehensive metabolic panel; Future    Other specified disorders of carbohydrate metabolism (CMS/HCC)    Morbid (severe) obesity due to excess calories (CMS/HCC)  Comments:  diet and exercise info given

## 2020-02-13 NOTE — PATIENT INSTRUCTIONS
Calorie Counting for Weight Loss  Calories are units of energy. Your body needs a certain amount of calories from food to keep you going throughout the day. When you eat more calories than your body needs, your body stores the extra calories as fat. When you eat fewer calories than your body needs, your body burns fat to get the energy it needs.  Calorie counting means keeping track of how many calories you eat and drink each day. Calorie counting can be helpful if you need to lose weight. If you make sure to eat fewer calories than your body needs, you should lose weight. Ask your health care provider what a healthy weight is for you.  For calorie counting to work, you will need to eat the right number of calories in a day in order to lose a healthy amount of weight per week. A dietitian can help you determine how many calories you need in a day and will give you suggestions on how to reach your calorie goal.  · A healthy amount of weight to lose per week is usually 1-2 lb (0.5-0.9 kg). This usually means that your daily calorie intake should be reduced by 500-750 calories.  · Eating 1,200 - 1,500 calories per day can help most women lose weight.  · Eating 1,500 - 1,800 calories per day can help most men lose weight.  What is my plan?  My goal is to have __________ calories per day.  If I have this many calories per day, I should lose around __________ pounds per week.  What do I need to know about calorie counting?  In order to meet your daily calorie goal, you will need to:  · Find out how many calories are in each food you would like to eat. Try to do this before you eat.  · Decide how much of the food you plan to eat.  · Write down what you ate and how many calories it had. Doing this is called keeping a food log.  To successfully lose weight, it is important to balance calorie counting with a healthy lifestyle that includes regular activity. Aim for 150 minutes of moderate exercise (such as walking) or 75  minutes of vigorous exercise (such as running) each week.  Where do I find calorie information?    The number of calories in a food can be found on a Nutrition Facts label. If a food does not have a Nutrition Facts label, try to look up the calories online or ask your dietitian for help.  Remember that calories are listed per serving. If you choose to have more than one serving of a food, you will have to multiply the calories per serving by the amount of servings you plan to eat. For example, the label on a package of bread might say that a serving size is 1 slice and that there are 90 calories in a serving. If you eat 1 slice, you will have eaten 90 calories. If you eat 2 slices, you will have eaten 180 calories.  How do I keep a food log?  Immediately after each meal, record the following information in your food log:  · What you ate. Don't forget to include toppings, sauces, and other extras on the food.  · How much you ate. This can be measured in cups, ounces, or number of items.  · How many calories each food and drink had.  · The total number of calories in the meal.  Keep your food log near you, such as in a small notebook in your pocket, or use a mobile miguel or website. Some programs will calculate calories for you and show you how many calories you have left for the day to meet your goal.  What are some calorie counting tips?    · Use your calories on foods and drinks that will fill you up and not leave you hungry:  ? Some examples of foods that fill you up are nuts and nut butters, vegetables, lean proteins, and high-fiber foods like whole grains. High-fiber foods are foods with more than 5 g fiber per serving.  ? Drinks such as sodas, specialty coffee drinks, alcohol, and juices have a lot of calories, yet do not fill you up.  · Eat nutritious foods and avoid empty calories. Empty calories are calories you get from foods or beverages that do not have many vitamins or protein, such as candy, sweets, and  "soda. It is better to have a nutritious high-calorie food (such as an avocado) than a food with few nutrients (such as a bag of chips).  · Know how many calories are in the foods you eat most often. This will help you calculate calorie counts faster.  · Pay attention to calories in drinks. Low-calorie drinks include water and unsweetened drinks.  · Pay attention to nutrition labels for \"low fat\" or \"fat free\" foods. These foods sometimes have the same amount of calories or more calories than the full fat versions. They also often have added sugar, starch, or salt, to make up for flavor that was removed with the fat.  · Find a way of tracking calories that works for you. Get creative. Try different apps or programs if writing down calories does not work for you.  What are some portion control tips?  · Know how many calories are in a serving. This will help you know how many servings of a certain food you can have.  · Use a measuring cup to measure serving sizes. You could also try weighing out portions on a kitchen scale. With time, you will be able to estimate serving sizes for some foods.  · Take some time to put servings of different foods on your favorite plates, bowls, and cups so you know what a serving looks like.  · Try not to eat straight from a bag or box. Doing this can lead to overeating. Put the amount you would like to eat in a cup or on a plate to make sure you are eating the right portion.  · Use smaller plates, glasses, and bowls to prevent overeating.  · Try not to multitask (for example, watch TV or use your computer) while eating. If it is time to eat, sit down at a table and enjoy your food. This will help you to know when you are full. It will also help you to be aware of what you are eating and how much you are eating.  What are tips for following this plan?  Reading food labels  · Check the calorie count compared to the serving size. The serving size may be smaller than what you are used to " "eating.  · Check the source of the calories. Make sure the food you are eating is high in vitamins and protein and low in saturated and trans fats.  Shopping  · Read nutrition labels while you shop. This will help you make healthy decisions before you decide to purchase your food.  · Make a grocery list and stick to it.  Cooking  · Try to cook your favorite foods in a healthier way. For example, try baking instead of frying.  · Use low-fat dairy products.  Meal planning  · Use more fruits and vegetables. Half of your plate should be fruits and vegetables.  · Include lean proteins like poultry and fish.  How do I count calories when eating out?  · Ask for smaller portion sizes.  · Consider sharing an entree and sides instead of getting your own entree.  · If you get your own entree, eat only half. Ask for a box at the beginning of your meal and put the rest of your entree in it so you are not tempted to eat it.  · If calories are listed on the menu, choose the lower calorie options.  · Choose dishes that include vegetables, fruits, whole grains, low-fat dairy products, and lean protein.  · Choose items that are boiled, broiled, grilled, or steamed. Stay away from items that are buttered, battered, fried, or served with cream sauce. Items labeled \"crispy\" are usually fried, unless stated otherwise.  · Choose water, low-fat milk, unsweetened iced tea, or other drinks without added sugar. If you want an alcoholic beverage, choose a lower calorie option such as a glass of wine or light beer.  · Ask for dressings, sauces, and syrups on the side. These are usually high in calories, so you should limit the amount you eat.  · If you want a salad, choose a garden salad and ask for grilled meats. Avoid extra toppings like gregory, cheese, or fried items. Ask for the dressing on the side, or ask for olive oil and vinegar or lemon to use as dressing.  · Estimate how many servings of a food you are given. For example, a serving of " cooked rice is ½ cup or about the size of half a baseball. Knowing serving sizes will help you be aware of how much food you are eating at restaurants. The list below tells you how big or small some common portion sizes are based on everyday objects:  ? 1 oz--4 stacked dice.  ? 3 oz--1 deck of cards.  ? 1 tsp--1 die.  ? 1 Tbsp--½ a ping-pong ball.  ? 2 Tbsp--1 ping-pong ball.  ? ½ cup--½ baseball.  ? 1 cup--1 baseball.  Summary  · Calorie counting means keeping track of how many calories you eat and drink each day. If you eat fewer calories than your body needs, you should lose weight.  · A healthy amount of weight to lose per week is usually 1-2 lb (0.5-0.9 kg). This usually means reducing your daily calorie intake by 500-750 calories.  · The number of calories in a food can be found on a Nutrition Facts label. If a food does not have a Nutrition Facts label, try to look up the calories online or ask your dietitian for help.  · Use your calories on foods and drinks that will fill you up, and not on foods and drinks that will leave you hungry.  · Use smaller plates, glasses, and bowls to prevent overeating.  This information is not intended to replace advice given to you by your health care provider. Make sure you discuss any questions you have with your health care provider.  Document Released: 12/18/2006 Document Revised: 09/06/2019 Document Reviewed: 11/17/2017  [employer] Interactive Patient Education © 2019 [employer]      Exercising to Lose Weight  Exercise is structured, repetitive physical activity to improve fitness and health. Getting regular exercise is important for everyone. It is especially important if you are overweight. Being overweight increases your risk of heart disease, stroke, diabetes, high blood pressure, and several types of cancer. Reducing your calorie intake and exercising can help you lose weight.  Exercise is usually categorized as moderate or vigorous intensity. To lose weight, most  people need to do a certain amount of moderate-intensity or vigorous-intensity exercise each week.  Moderate-intensity exercise    Moderate-intensity exercise is any activity that gets you moving enough to burn at least three times more energy (calories) than if you were sitting.  Examples of moderate exercise include:  · Walking a mile in 15 minutes.  · Doing light yard work.  · Biking at an easy pace.  Most people should get at least 150 minutes (2 hours and 30 minutes) a week of moderate-intensity exercise to maintain their body weight.  Vigorous-intensity exercise  Vigorous-intensity exercise is any activity that gets you moving enough to burn at least six times more calories than if you were sitting. When you exercise at this intensity, you should be working hard enough that you are not able to carry on a conversation.  Examples of vigorous exercise include:  · Running.  · Playing a team sport, such as football, basketball, and soccer.  · Jumping rope.  Most people should get at least 75 minutes (1 hour and 15 minutes) a week of vigorous-intensity exercise to maintain their body weight.  How can exercise affect me?  When you exercise enough to burn more calories than you eat, you lose weight. Exercise also reduces body fat and builds muscle. The more muscle you have, the more calories you burn. Exercise also:  · Improves mood.  · Reduces stress and tension.  · Improves your overall fitness, flexibility, and endurance.  · Increases bone strength.  The amount of exercise you need to lose weight depends on:  · Your age.  · The type of exercise.  · Any health conditions you have.  · Your overall physical ability.  Talk to your health care provider about how much exercise you need and what types of activities are safe for you.  What actions can I take to lose weight?  Nutrition    · Make changes to your diet as told by your health care provider or diet and nutrition specialist (dietitian). This may  include:  ? Eating fewer calories.  ? Eating more protein.  ? Eating less unhealthy fats.  ? Eating a diet that includes fresh fruits and vegetables, whole grains, low-fat dairy products, and lean protein.  ? Avoiding foods with added fat, salt, and sugar.  · Drink plenty of water while you exercise to prevent dehydration or heat stroke.  Activity  · Choose an activity that you enjoy and set realistic goals. Your health care provider can help you make an exercise plan that works for you.  · Exercise at a moderate or vigorous intensity most days of the week.  ? The intensity of exercise may vary from person to person. You can tell how intense a workout is for you by paying attention to your breathing and heartbeat. Most people will notice their breathing and heartbeat get faster with more intense exercise.  · Do resistance training twice each week, such as:  ? Push-ups.  ? Sit-ups.  ? Lifting weights.  ? Using resistance bands.  · Getting short amounts of exercise can be just as helpful as long structured periods of exercise. If you have trouble finding time to exercise, try to include exercise in your daily routine.  ? Get up, stretch, and walk around every 30 minutes throughout the day.  ? Go for a walk during your lunch break.  ? Park your car farther away from your destination.  ? If you take public transportation, get off one stop early and walk the rest of the way.  ? Make phone calls while standing up and walking around.  ? Take the stairs instead of elevators or escalators.  · Wear comfortable clothes and shoes with good support.  · Do not exercise so much that you hurt yourself, feel dizzy, or get very short of breath.  Where to find more information  · [place] Department of Health and Human Services: www.hhs.gov  · Centers for Disease Control and Prevention (CDC): www.cdc.gov  Contact a health care provider:  · Before starting a new exercise program.  · If you have questions or concerns about your  weight.  · If you have a medical problem that keeps you from exercising.  Get help right away if you have any of the following while exercising:  · Injury.  · Dizziness.  · Difficulty breathing or shortness of breath that does not go away when you stop exercising.  · Chest pain.  · Rapid heartbeat.  Summary  · Being overweight increases your risk of heart disease, stroke, diabetes, high blood pressure, and several types of cancer.  · Losing weight happens when you burn more calories than you eat.  · Reducing the amount of calories you eat in addition to getting regular moderate or vigorous exercise each week helps you lose weight.  This information is not intended to replace advice given to you by your health care provider. Make sure you discuss any questions you have with your health care provider.  Document Released: 01/20/2012 Document Revised: 12/31/2018 Document Reviewed: 12/31/2018  [employer] Interactive Patient Education © 2019 [employer]

## 2020-03-26 ENCOUNTER — TELEPHONE (OUTPATIENT)
Dept: FAMILY MEDICINE CLINIC | Facility: CLINIC | Age: 75
End: 2020-03-26

## 2020-03-26 NOTE — TELEPHONE ENCOUNTER
----- Message from SHAHZAD Sommer sent at 3/26/2020  2:00 PM CDT -----  Regarding: FW: Complaint  Contact: [phone]  Find out if she is still taking the antibiotics.  If not she probably needs to be.  If she feels like the infection is out of control still and she is dehydrated, she needs to return to theer.    ----- Message -----  From: Trisha Zapata MA  Sent: 3/26/2020   1:57 PM CDT  To: SHAHZAD Sommer  Subject: FW: Complaint                                        ----- Message -----  From: Pita Hyatt  Sent: 3/25/2020   9:51 AM CDT  To: [facility]  Subject: Complaint                                        I was in the [facility] ER on the 19th for Diverticulitis infection.  I have been on antibiotics (metroNIDAZOLE 500 mg 3xday and CIPROFLOXACIN HCL 500mg 2xday).  I am still having some liquid bowel movement every time I go to urinate.  I am not feeling well either--tired, weak.    What should I do?

## 2020-03-26 NOTE — TELEPHONE ENCOUNTER
No answer lm on vm to please go back to the ER she may be dehydrated and to call office if she has any questions

## 2020-04-25 DIAGNOSIS — E03.9 HYPOTHYROIDISM, UNSPECIFIED TYPE: ICD-10-CM

## 2020-04-27 RX ORDER — LEVOTHYROXINE SODIUM 0.1 MG/1
TABLET ORAL
Qty: 90 TABLET | Refills: 0 | Status: SHIPPED | OUTPATIENT
Start: 2020-04-27 | End: 2020-06-19 | Stop reason: SDUPTHER

## 2020-04-30 DIAGNOSIS — E55.9 VITAMIN D DEFICIENCY: ICD-10-CM

## 2020-04-30 RX ORDER — ERGOCALCIFEROL 1.25 MG/1
CAPSULE ORAL
Qty: 4 CAPSULE | Refills: 1 | Status: SHIPPED | OUTPATIENT
Start: 2020-04-30 | End: 2020-06-19 | Stop reason: SDUPTHER

## 2020-06-06 ENCOUNTER — APPOINTMENT (OUTPATIENT)
Dept: GENERAL RADIOLOGY | Facility: HOSPITAL | Age: 75
End: 2020-06-06

## 2020-06-06 ENCOUNTER — HOSPITAL ENCOUNTER (EMERGENCY)
Facility: HOSPITAL | Age: 75
Discharge: HOME | End: 2020-06-07
Attending: EMERGENCY MEDICINE | Admitting: EMERGENCY MEDICINE

## 2020-06-06 ENCOUNTER — APPOINTMENT (OUTPATIENT)
Dept: CT IMAGING | Facility: HOSPITAL | Age: 75
End: 2020-06-06

## 2020-06-06 DIAGNOSIS — K57.92 ACUTE DIVERTICULITIS: Primary | ICD-10-CM

## 2020-06-06 LAB
BILIRUB UR QL STRIP: NEGATIVE
CLARITY UR: CLEAR
COLOR UR: YELLOW
GLUCOSE UR STRIP-MCNC: NEGATIVE MG/DL
HGB UR QL STRIP.AUTO: NEGATIVE
KETONES UR QL STRIP: NEGATIVE
LEUKOCYTE ESTERASE UR QL STRIP.AUTO: NEGATIVE
NITRITE UR QL STRIP: NEGATIVE
PH UR STRIP.AUTO: 5.5 [PH] (ref 5–9)
PROT UR QL STRIP: NEGATIVE
SP GR UR STRIP: 1.02 (ref 1–1.03)
UROBILINOGEN UR QL STRIP: NORMAL

## 2020-06-06 PROCEDURE — 81003 URINALYSIS AUTO W/O SCOPE: CPT | Performed by: EMERGENCY MEDICINE

## 2020-06-06 PROCEDURE — 99284 EMERGENCY DEPT VISIT MOD MDM: CPT

## 2020-06-06 PROCEDURE — P9612 CATHETERIZE FOR URINE SPEC: HCPCS

## 2020-06-06 PROCEDURE — 74177 CT ABD & PELVIS W/CONTRAST: CPT

## 2020-06-06 PROCEDURE — 71045 X-RAY EXAM CHEST 1 VIEW: CPT

## 2020-06-06 RX ORDER — SODIUM CHLORIDE 0.9 % (FLUSH) 0.9 %
10 SYRINGE (ML) INJECTION AS NEEDED
Status: DISCONTINUED | OUTPATIENT
Start: 2020-06-06 | End: 2020-06-07 | Stop reason: HOSPADM

## 2020-06-06 RX ORDER — ACETAMINOPHEN 500 MG
1000 TABLET ORAL ONCE
Status: COMPLETED | OUTPATIENT
Start: 2020-06-06 | End: 2020-06-06

## 2020-06-06 RX ADMIN — ACETAMINOPHEN 1000 MG: 500 TABLET ORAL at 23:41

## 2020-06-07 VITALS
HEIGHT: 67 IN | SYSTOLIC BLOOD PRESSURE: 112 MMHG | OXYGEN SATURATION: 97 % | BODY MASS INDEX: 36.26 KG/M2 | WEIGHT: 231 LBS | DIASTOLIC BLOOD PRESSURE: 55 MMHG | TEMPERATURE: 99.8 F | HEART RATE: 86 BPM | RESPIRATION RATE: 18 BRPM

## 2020-06-07 LAB
ALBUMIN SERPL-MCNC: 2.7 G/DL (ref 3.5–5.2)
ALBUMIN/GLOB SERPL: 0.9 G/DL
ALP SERPL-CCNC: 163 U/L (ref 39–117)
ALT SERPL W P-5'-P-CCNC: 31 U/L (ref 1–33)
ANION GAP SERPL CALCULATED.3IONS-SCNC: 11 MMOL/L (ref 5–15)
AST SERPL-CCNC: 79 U/L (ref 1–32)
BASOPHILS # BLD AUTO: 0.02 10*3/MM3 (ref 0–0.2)
BASOPHILS NFR BLD AUTO: 0.3 % (ref 0–1.5)
BILIRUB SERPL-MCNC: 1.2 MG/DL (ref 0.2–1.2)
BUN BLD-MCNC: 12 MG/DL (ref 8–23)
BUN/CREAT SERPL: 14.1 (ref 7–25)
CALCIUM SPEC-SCNC: 9.6 MG/DL (ref 8.6–10.5)
CHLORIDE SERPL-SCNC: 109 MMOL/L (ref 98–107)
CO2 SERPL-SCNC: 19 MMOL/L (ref 22–29)
CREAT BLD-MCNC: 0.85 MG/DL (ref 0.57–1)
D-LACTATE SERPL-SCNC: 1.8 MMOL/L (ref 0.5–2)
DEPRECATED RDW RBC AUTO: 52.6 FL (ref 37–54)
EOSINOPHIL # BLD AUTO: 0.06 10*3/MM3 (ref 0–0.4)
EOSINOPHIL NFR BLD AUTO: 1 % (ref 0.3–6.2)
ERYTHROCYTE [DISTWIDTH] IN BLOOD BY AUTOMATED COUNT: 16.5 % (ref 12.3–15.4)
GFR SERPL CREATININE-BSD FRML MDRD: 65 ML/MIN/1.73
GLOBULIN UR ELPH-MCNC: 3.1 GM/DL
GLUCOSE BLD-MCNC: 129 MG/DL (ref 65–99)
HCT VFR BLD AUTO: 30.9 % (ref 34–46.6)
HGB BLD-MCNC: 10.2 G/DL (ref 12–15.9)
HOLD SPECIMEN: NORMAL
IMM GRANULOCYTES # BLD AUTO: 0.02 10*3/MM3 (ref 0–0.05)
IMM GRANULOCYTES NFR BLD AUTO: 0.3 % (ref 0–0.5)
LIPASE SERPL-CCNC: 52 U/L (ref 13–60)
LYMPHOCYTES # BLD AUTO: 0.82 10*3/MM3 (ref 0.7–3.1)
LYMPHOCYTES NFR BLD AUTO: 14.3 % (ref 19.6–45.3)
MCH RBC QN AUTO: 28.7 PG (ref 26.6–33)
MCHC RBC AUTO-ENTMCNC: 33 G/DL (ref 31.5–35.7)
MCV RBC AUTO: 86.8 FL (ref 79–97)
MONOCYTES # BLD AUTO: 0.67 10*3/MM3 (ref 0.1–0.9)
MONOCYTES NFR BLD AUTO: 11.7 % (ref 5–12)
NEUTROPHILS # BLD AUTO: 4.16 10*3/MM3 (ref 1.7–7)
NEUTROPHILS NFR BLD AUTO: 72.4 % (ref 42.7–76)
NRBC BLD AUTO-RTO: 0 /100 WBC (ref 0–0.2)
PLATELET # BLD AUTO: 111 10*3/MM3 (ref 140–450)
PMV BLD AUTO: 10.5 FL (ref 6–12)
POTASSIUM BLD-SCNC: 3.7 MMOL/L (ref 3.5–5.2)
PROT SERPL-MCNC: 5.8 G/DL (ref 6–8.5)
RBC # BLD AUTO: 3.56 10*6/MM3 (ref 3.77–5.28)
SODIUM BLD-SCNC: 139 MMOL/L (ref 136–145)
WBC NRBC COR # BLD: 5.75 10*3/MM3 (ref 3.4–10.8)

## 2020-06-07 PROCEDURE — 25010000002 IOPAMIDOL 61 % SOLUTION: Performed by: EMERGENCY MEDICINE

## 2020-06-07 PROCEDURE — 83605 ASSAY OF LACTIC ACID: CPT | Performed by: EMERGENCY MEDICINE

## 2020-06-07 PROCEDURE — 85025 COMPLETE CBC W/AUTO DIFF WBC: CPT | Performed by: EMERGENCY MEDICINE

## 2020-06-07 PROCEDURE — 83690 ASSAY OF LIPASE: CPT | Performed by: EMERGENCY MEDICINE

## 2020-06-07 PROCEDURE — 87040 BLOOD CULTURE FOR BACTERIA: CPT | Performed by: EMERGENCY MEDICINE

## 2020-06-07 PROCEDURE — 36415 COLL VENOUS BLD VENIPUNCTURE: CPT | Performed by: EMERGENCY MEDICINE

## 2020-06-07 PROCEDURE — 80053 COMPREHEN METABOLIC PANEL: CPT | Performed by: EMERGENCY MEDICINE

## 2020-06-07 RX ORDER — AMOXICILLIN AND CLAVULANATE POTASSIUM 875; 125 MG/1; MG/1
1 TABLET, FILM COATED ORAL ONCE
Status: COMPLETED | OUTPATIENT
Start: 2020-06-07 | End: 2020-06-07

## 2020-06-07 RX ORDER — DIPHENOXYLATE HYDROCHLORIDE AND ATROPINE SULFATE 2.5; .025 MG/1; MG/1
1 TABLET ORAL ONCE
Status: COMPLETED | OUTPATIENT
Start: 2020-06-07 | End: 2020-06-07

## 2020-06-07 RX ORDER — METRONIDAZOLE 500 MG/1
500 TABLET ORAL ONCE
Status: COMPLETED | OUTPATIENT
Start: 2020-06-07 | End: 2020-06-07

## 2020-06-07 RX ORDER — AMOXICILLIN AND CLAVULANATE POTASSIUM 875; 125 MG/1; MG/1
1 TABLET, FILM COATED ORAL 2 TIMES DAILY
Qty: 20 TABLET | Refills: 0 | Status: SHIPPED | OUTPATIENT
Start: 2020-06-07 | End: 2020-06-19

## 2020-06-07 RX ORDER — METRONIDAZOLE 500 MG/1
500 TABLET ORAL 3 TIMES DAILY
Qty: 30 TABLET | Refills: 0 | Status: SHIPPED | OUTPATIENT
Start: 2020-06-07 | End: 2020-06-19

## 2020-06-07 RX ADMIN — AMOXICILLIN AND CLAVULANATE POTASSIUM 1 TABLET: 875; 125 TABLET, FILM COATED ORAL at 02:53

## 2020-06-07 RX ADMIN — IOPAMIDOL 90 ML: 612 INJECTION, SOLUTION INTRAVENOUS at 01:35

## 2020-06-07 RX ADMIN — SODIUM CHLORIDE 500 ML: 9 INJECTION, SOLUTION INTRAVENOUS at 02:14

## 2020-06-07 RX ADMIN — METRONIDAZOLE 500 MG: 500 TABLET ORAL at 02:53

## 2020-06-07 RX ADMIN — DIPHENOXYLATE HYDROCHLORIDE AND ATROPINE SULFATE 1 TABLET: 2.5; .025 TABLET ORAL at 01:47

## 2020-06-07 NOTE — ED NOTES
Patient had episode of diarrhea. Linen changed and patient cleaned.      Judit Jiménez, RN  06/07/20 [phone]

## 2020-06-07 NOTE — ED NOTES
4 IV attempts by two RN's unsuccessful at this time. Charge nurse and Lab notified.      Uma Liang, NICHOLE  06/06/20 [id number]

## 2020-06-07 NOTE — ED NOTES
Patient was placed on bedpan. She had bowel movement, diarrhea. Bedding was changed and patient was cleaned. Patient requested to leave [place]. Dr Andree ZAFAR made aware. Dr Andree ZAFAR at bedside at this time.      Judit Jiménez RN  06/06/20 [id number]       Judit Jiménez RN  06/07/20 [id number]

## 2020-06-07 NOTE — ED NOTES
Patient had episode of diarrhea. Linen changed and patient cleaned.      Judit Jiménez, RN  06/07/20 0209

## 2020-06-07 NOTE — ED PROVIDER NOTES
Subjective   75-year-old white female presents to the emergency department with chief complaint of fever for 2 days.  Patient complains of fatigue and generalized weakness.  She has abdominal pain and she is constipated.          Review of Systems   Constitutional: Positive for chills and fever. Negative for diaphoresis.   Respiratory: Negative for cough and shortness of breath.    Cardiovascular: Negative for chest pain.   Gastrointestinal: Positive for abdominal pain and constipation. Negative for diarrhea, nausea and vomiting.   Genitourinary: Negative for dysuria.   Musculoskeletal: Positive for arthralgias. Negative for back pain and neck pain.   Neurological: Positive for weakness and headaches. Negative for syncope.   All other systems reviewed and are negative.      Past Medical History:   Diagnosis Date   • Acquired hypothyroidism    • Acute bronchitis    • Acute gastritis    • Acute maxillary sinusitis    • Acute maxillary sinusitis, unspecified    • Acute serous otitis media     R   • Acute sinusitis     Could be R mastoid    • Allergic rhinitis    • Anxiety state    • Chronic sinusitis    • Conjunctivitis    • Cough    • Degenerative joint disease of shoulder region    • Depressive disorder     improving   • Diarrhea    • Dysfunction of eustachian tube    • Epigastric pain    • Female stress incontinence    • Forgetfulness    • Functional diarrhea    • Gynecological Examination    • Knee joint replaced by other means    • Malaise and fatigue    • Menopause    • Nausea and vomiting    • Other Chest pain    • Polyp of colon    • Preop examination, unspecified    • Rhinitis medicamentosa    • Shoulder pain    • Skin tag- removal    • Subclinical hypothyroidism    • Vagal reaction        Allergies   Allergen Reactions   • Phenergan [Promethazine] Itching       Past Surgical History:   Procedure Laterality Date   • COLONOSCOPY  07/16/2012   • COLONOSCOPY  01/13/2017   • ENDOSCOPY AND COLONOSCOPY       Diverticulum in sigmoid colon. 2 polyps in ascending colon & descending colon; removed by snare cautery polypectomy   • HYSTERECTOMY     • INJECTION OF MEDICATION      celestone (betamethasone) 12mg   • INJECTION OF MEDICATION      Depo Medrol 2ml   • INJECTION OF MEDICATION      Rocephin 1gm   • JOINT REPLACEMENT Bilateral 2015    basim total knee replacements   • REPLACEMENT TOTAL KNEE     • SINOSCOPY PROCEDURE Bilateral 2018    Procedure: BILATERAL ENDOSCOPIC EHTMOIDECTOMY, MAXILLARY SINUS ANTROSTOMY, FRONTAL RECESS EXPLORATION, POSSIBLE SEPTOPLASY         BRAINLAB;  Surgeon: Daniel Griffith MD;  Location: [facility];  Service: ENT   • TOTAL ABDOMINAL HYSTERECTOMY WITH SALPINGO OOPHORECTOMY     • UPPER GASTROINTESTINAL ENDOSCOPY  2012   • UPPER GASTROINTESTINAL ENDOSCOPY  2017       Family History   Problem Relation Age of Onset   • Hypertension Other    • Stroke Other    • Diabetes Other    • Ulcers Other    • Cancer Other    • Stroke Father    • Sinusitis Mother        Social History     Socioeconomic History   • Marital status:      Spouse name: Not on file   • Number of children: Not on file   • Years of education: Not on file   • Highest education level: Not on file   Tobacco Use   • Smoking status: Former Smoker     Last attempt to quit: 1980     Years since quittin.4   • Smokeless tobacco: Never Used   Substance and Sexual Activity   • Alcohol use: Yes     Comment: occasional   • Drug use: No           Objective   Physical Exam   Constitutional: She is oriented to person, place, and time. She appears well-developed and well-nourished. No distress.   HENT:   Head: Normocephalic and atraumatic.   Right Ear: External ear normal.   Left Ear: External ear normal.   Nose: Nose normal.   Mouth/Throat: Oropharynx is clear and moist.   Eyes: Pupils are equal, round, and reactive to light. Conjunctivae and EOM are normal.   Neck: Normal range of motion. Neck supple.    Cardiovascular: Normal rate, regular rhythm, normal heart sounds and intact distal pulses.   Pulmonary/Chest: Effort normal and breath sounds normal.   Abdominal: Soft. Normal appearance and bowel sounds are normal. She exhibits no distension and no mass. There is tenderness in the left lower quadrant. There is no rigidity, no rebound and no guarding.   Musculoskeletal: Normal range of motion. She exhibits no edema or tenderness.   Neurological: She is alert and oriented to person, place, and time. No cranial nerve deficit or sensory deficit. She exhibits normal muscle tone.   Skin: Skin is warm and dry. No rash noted. She is not diaphoretic.   Psychiatric: She has a normal mood and affect. Her behavior is normal.   Nursing note and vitals reviewed.      Procedures           ED Course  ED Course as of Jun 07 0234   Sun Jun 07, 2020 0233 Patient is alert and resting comfortably. I reviewed the results of the emergency department evaluation with the patient.  I recommended primary care follow-up.  I advised the patient to return to the emergency department if their symptoms change or worsen.     [DR]      ED Course User Index  [DR] Robert Candelario MD            Labs Reviewed   COMPREHENSIVE METABOLIC PANEL - Abnormal; Notable for the following components:       Result Value    Glucose 129 (*)     Chloride 109 (*)     CO2 19.0 (*)     Total Protein 5.8 (*)     Albumin 2.70 (*)     AST (SGOT) 79 (*)     Alkaline Phosphatase 163 (*)     All other components within normal limits    Narrative:     GFR Normal >60  Chronic Kidney Disease <60  Kidney Failure <15     CBC WITH AUTO DIFFERENTIAL - Abnormal; Notable for the following components:    RBC 3.56 (*)     Hemoglobin 10.2 (*)     Hematocrit 30.9 (*)     RDW 16.5 (*)     Platelets 111 (*)     Lymphocyte % 14.3 (*)     All other components within normal limits   URINALYSIS W/ MICROSCOPIC IF INDICATED (NO CULTURE) - Normal    Narrative:     Urine microscopic not  indicated.   LIPASE - Normal   LACTIC ACID, PLASMA - Normal   BLOOD CULTURE   BLOOD CULTURE   CBC AND DIFFERENTIAL    Narrative:     The following orders were created for panel order CBC & Differential.  Procedure                               Abnormality         Status                     ---------                               -----------         ------                     CBC Auto Differential[[id number]]        Abnormal            Final result                 Please view results for these tests on the individual orders.   EXTRA TUBES    Narrative:     The following orders were created for panel order Extra Tubes.  Procedure                               Abnormality         Status                     ---------                               -----------         ------                     Light Blue Top[[id number]]                                                              Gold Top - SST[[id number]]                                   In process                   Please view results for these tests on the individual orders.   LIGHT BLUE TOP   GOLD TOP - SST     Ct Abdomen Pelvis With Contrast    Result Date: 6/7/2020  Narrative: CT abdomen and pelvis with contrast on  6/7/2020 CLINICAL INDICATION: Left lower quadrant pain, fever TECHNIQUE: Multiple axial images are obtained throughout the abdomen and pelvis following the administration of IV contrast, 90 mL of Isovue-300 contrast was administered intravenously without complication. This exam was performed according to our departmental dose-optimization program, which includes automated exposure control, adjustment of the mA and/or kV according to patient size and/or use of iterative reconstruction technique. Total DLP is 558.4 mGy*cm. COMPARISON:  None FINDINGS: Abdomen: The lung bases are clear. Coronary artery calcifications and other vascular calcifications are noted. There is an irregular contour of a small liver consistent with changes of cirrhosis. Mild  splenomegaly is noted with the spleen measuring 14.3 cm in greatest [id number] length. Large right-sided renal venous shunts are noted. Probable small gallstone is noted in the gallbladder. The solid abdominal organs are otherwise unremarkable. Vascular calcifications are noted. There is no abdominal adenopathy. There is no free fluid or free air within the abdomen. The abdominal portion of the GI tract is unremarkable. Pelvis: There is bowel wall thickening of the distal descending/sigmoid colon with adjacent fat stranding around multiple diverticula consistent with acute diverticulitis. No evidence of abscess or perforation is noted. Trace free fluid is noted in the pelvis. The patient is status post hysterectomy. Pelvic portion of the GI tract including the appendix is otherwise unremarkable. There is no pelvic adenopathy. Degenerative changes are noted in the spine.     Impression: 1. Findings consistent with acute distal descending colon diverticulitis without evidence of abscess or perforation. 2. Changes of cirrhosis with mild splenomegaly and evidence of portal hypertension as above. 3. Probable cholelithiasis. Electronically signed by:  Medardo Mckoy  6/7/2020 2:27 AM CDT Workstation: [id number]    Xr Chest 1 View    Result Date: 6/6/2020  Narrative: CHEST 1 VIEW on 6/6/2020 CLINICAL INDICATION: Fever COMPARISON: 4/4/2016 FINDINGS: Mild chronic interstitial changes are noted. There is minimal biapical scarring. The lungs are otherwise clear. Cardiac, hilar and mediastinal contours are within normal limits. No bony abnormality is noted.     Impression: No acute disease. Electronically signed by:  Medardo Mckoy  6/6/2020 11:36 PM CDT Workstation: [id number]                                    MDM    Final diagnoses:   Acute diverticulitis            Robert Candelario MD  06/07/20 0234

## 2020-06-07 NOTE — ED NOTES
"Pt refused taking BP, says \"this thing hurts me so bad, take this thing off me\".     Ashley Martin  06/06/20 5427    "

## 2020-06-08 ENCOUNTER — TELEPHONE (OUTPATIENT)
Dept: FAMILY MEDICINE CLINIC | Facility: CLINIC | Age: 75
End: 2020-06-08

## 2020-06-08 NOTE — TELEPHONE ENCOUNTER
PT CALLED IN STATING THAT SHE WAS IN THE ER OVER THE WEEKEND AND NEEDS TO MAKE A FOLLOW UP APPOINTMENT    PT CONTACT: [phone]

## 2020-06-08 NOTE — TELEPHONE ENCOUNTER
Spoke with patient and scheduled her for next available which was on the 16th, she wants to know if she can be worked in sooner? She is also requesting a refill on tramadol. Please contact patient.

## 2020-06-09 ENCOUNTER — TELEPHONE (OUTPATIENT)
Dept: FAMILY MEDICINE CLINIC | Facility: CLINIC | Age: 75
End: 2020-06-09

## 2020-06-09 NOTE — TELEPHONE ENCOUNTER
PT CALLED IN STATING THAT SHE CAN WAIT UNTIL HER  6/16/20 FOR MEDICATION. PT STATES THAT SHE WILL EVERYDAY UNTIL MEDICATION IS REFILLED    PT CONTACT: [phone]

## 2020-06-09 NOTE — TELEPHONE ENCOUNTER
Spoke with patient and advised that she has to be seen before she can get a refill on tramadol. Patient stated that she does not abuse it and she doesn't see why she can not get it filled.  Advised it is a controlled substance and she has to be seen every three months for a controlled substance. Patient also stated the ER Doctor told her she needed to be seen by provider ASAP.  Advised patient that the 16 th was a soon as we had and that the only way it would be sooner is if she called Thursday or friday early enough and someone has canceled.  Patient gave verbal understanding but was not happy with the outcome because she feels she can be seen sooner and she should get her tramadol if she needs it.

## 2020-06-09 NOTE — TELEPHONE ENCOUNTER
The 16 th is the soonest we have unless someone cancels Thursday or Friday and she needs to be seen before her tramadol can be refilled.

## 2020-06-12 LAB
BACTERIA SPEC AEROBE CULT: NORMAL
BACTERIA SPEC AEROBE CULT: NORMAL

## 2020-06-16 ENCOUNTER — OFFICE VISIT (OUTPATIENT)
Dept: FAMILY MEDICINE CLINIC | Facility: CLINIC | Age: 75
End: 2020-06-16

## 2020-06-16 VITALS
DIASTOLIC BLOOD PRESSURE: 79 MMHG | OXYGEN SATURATION: 97 % | TEMPERATURE: 97.8 F | SYSTOLIC BLOOD PRESSURE: 143 MMHG | WEIGHT: 231 LBS | HEART RATE: 82 BPM | RESPIRATION RATE: 20 BRPM | BODY MASS INDEX: 36.26 KG/M2 | HEIGHT: 67 IN

## 2020-06-16 DIAGNOSIS — B37.0 THRUSH: ICD-10-CM

## 2020-06-16 DIAGNOSIS — E03.9 HYPOTHYROIDISM, UNSPECIFIED TYPE: ICD-10-CM

## 2020-06-16 DIAGNOSIS — K21.9 GASTROESOPHAGEAL REFLUX DISEASE, ESOPHAGITIS PRESENCE NOT SPECIFIED: ICD-10-CM

## 2020-06-16 DIAGNOSIS — K80.20 GALLSTONES: Primary | ICD-10-CM

## 2020-06-16 DIAGNOSIS — R11.0 NAUSEA: ICD-10-CM

## 2020-06-16 DIAGNOSIS — H04.123 DRY EYES, BILATERAL: ICD-10-CM

## 2020-06-16 DIAGNOSIS — E55.9 VITAMIN D DEFICIENCY: ICD-10-CM

## 2020-06-16 DIAGNOSIS — M54.6 THORACIC BACK PAIN, UNSPECIFIED BACK PAIN LATERALITY, UNSPECIFIED CHRONICITY: ICD-10-CM

## 2020-06-16 DIAGNOSIS — J30.9 ALLERGIC RHINITIS, UNSPECIFIED SEASONALITY, UNSPECIFIED TRIGGER: ICD-10-CM

## 2020-06-16 PROCEDURE — 99214 OFFICE O/P EST MOD 30 MIN: CPT | Performed by: NURSE PRACTITIONER

## 2020-06-16 RX ORDER — TRAMADOL HYDROCHLORIDE 50 MG/1
50 TABLET ORAL EVERY 8 HOURS PRN
Qty: 90 TABLET | Refills: 0 | Status: SHIPPED | OUTPATIENT
Start: 2020-06-16 | End: 2020-06-19 | Stop reason: SDUPTHER

## 2020-06-16 RX ORDER — FLUTICASONE PROPIONATE 50 MCG
2 SPRAY, SUSPENSION (ML) NASAL DAILY
Qty: 16 G | Refills: 3 | Status: SHIPPED | OUTPATIENT
Start: 2020-06-16 | End: 2021-05-14 | Stop reason: SDUPTHER

## 2020-06-16 RX ORDER — TRAMADOL HYDROCHLORIDE 50 MG/1
50 TABLET ORAL EVERY 8 HOURS PRN
Qty: 90 TABLET | Refills: 0 | Status: SHIPPED | OUTPATIENT
Start: 2020-06-16 | End: 2020-08-28 | Stop reason: SDUPTHER

## 2020-06-16 RX ORDER — CYCLOSPORINE 0.5 MG/ML
1 EMULSION OPHTHALMIC EVERY 12 HOURS
Qty: 5.5 ML | Refills: 5 | Status: SHIPPED | OUTPATIENT
Start: 2020-06-16 | End: 2021-05-14 | Stop reason: SDUPTHER

## 2020-06-16 RX ORDER — OMEPRAZOLE 20 MG/1
20 CAPSULE, DELAYED RELEASE ORAL DAILY
Qty: 30 CAPSULE | Refills: 5 | Status: SHIPPED | OUTPATIENT
Start: 2020-06-16 | End: 2021-05-14 | Stop reason: SDUPTHER

## 2020-06-16 RX ORDER — ONDANSETRON 4 MG/1
4 TABLET, FILM COATED ORAL 4 TIMES DAILY PRN
Qty: 20 TABLET | Refills: 0 | Status: SHIPPED | OUTPATIENT
Start: 2020-06-16 | End: 2021-05-14

## 2020-06-16 RX ORDER — ERGOCALCIFEROL 1.25 MG/1
50000 CAPSULE ORAL WEEKLY
Qty: 4 CAPSULE | Refills: 5 | Status: SHIPPED | OUTPATIENT
Start: 2020-06-16 | End: 2020-09-25 | Stop reason: SDUPTHER

## 2020-06-16 RX ORDER — LEVOTHYROXINE SODIUM 0.1 MG/1
100 TABLET ORAL DAILY
Qty: 90 TABLET | Refills: 0 | Status: SHIPPED | OUTPATIENT
Start: 2020-06-16 | End: 2020-10-27

## 2020-06-17 ENCOUNTER — TELEPHONE (OUTPATIENT)
Dept: FAMILY MEDICINE CLINIC | Facility: CLINIC | Age: 75
End: 2020-06-17

## 2020-06-19 NOTE — PROGRESS NOTES
Subjective   Pita Hyatt is a 75 y.o. female.     Here today for josephine.  She has had some problems with her diverticulosis.  She has been to the er for this.  However she is having some issues with right upper quad pain.  She still has her gallbladder and has never had an u/s done.  However the ct done at the er for the c/o diverticulosis showed she had some gallstones.    Diverticulitis   This is a recurrent problem. The current episode started 1 to 4 weeks ago. The problem occurs intermittently. The problem has been waxing and waning. Associated symptoms include abdominal pain and a change in bowel habit. Pertinent negatives include no chest pain, chills, congestion, coughing, diaphoresis, fatigue, joint swelling, neck pain, numbness, rash, sore throat, swollen glands, urinary symptoms, vertigo, visual change or weakness. The symptoms are aggravated by eating. She has tried NSAIDs and oral narcotics (antibiotics) for the symptoms. The treatment provided moderate relief.        The following portions of the patient's history were reviewed and updated as appropriate: allergies, current medications, past family history, past medical history, past social history, past surgical history and problem list.    Review of Systems   Constitutional: Negative.  Negative for chills, diaphoresis and fatigue.   HENT: Negative.  Negative for congestion, sore throat and swollen glands.    Eyes: Negative.    Respiratory: Negative.  Negative for cough.    Cardiovascular: Negative.  Negative for chest pain.   Gastrointestinal: Positive for abdominal pain, change in bowel habit, diarrhea and indigestion. Negative for abdominal distention, blood in stool and constipation.   Endocrine: Negative.    Genitourinary: Negative.    Musculoskeletal: Negative.  Negative for joint swelling and neck pain.   Skin: Negative.  Negative for rash.   Allergic/Immunologic: Negative.    Neurological: Negative.  Negative for vertigo, weakness and  numbness.   Hematological: Negative.    Psychiatric/Behavioral: Negative.        Objective   Physical Exam   Constitutional: She is oriented to person, place, and time. She appears well-developed and well-nourished. No distress.   HENT:   Head: Normocephalic and atraumatic.   Mouth/Throat: No oropharyngeal exudate.   Eyes: Pupils are equal, round, and reactive to light.   Neck: Normal range of motion. Neck supple. No thyromegaly present.   Cardiovascular: Normal rate, regular rhythm and normal heart sounds. Exam reveals no friction rub.   No murmur heard.  Pulmonary/Chest: Effort normal and breath sounds normal. No respiratory distress. She has no wheezes. She has no rales.   Abdominal: Soft. Bowel sounds are normal. She exhibits no distension. There is tenderness (right upper quad tenderness). There is no guarding.   Musculoskeletal: Normal range of motion.   Neurological: She is alert and oriented to person, place, and time.   Skin: Skin is warm and dry.   Psychiatric: She has a normal mood and affect. Thought content normal.   Nursing note and vitals reviewed.        Assessment/Plan   [place] was seen today for diverticulitis.    Diagnoses and all orders for this visit:    Gallstones  -     US Gallbladder; Future    Nausea  -     ondansetron (ZOFRAN) 4 MG tablet; Take 1 tablet by mouth 4 (Four) Times a Day As Needed for Nausea.    Dry eyes, bilateral  -     cycloSPORINE (RESTASIS) 0.05 % ophthalmic emulsion; Apply 1 drop to eye(s) as directed by provider Every 12 (Twelve) Hours.    Vitamin D deficiency  -     vitamin D (ERGOCALCIFEROL) 1.25 MG (69989 UT) capsule capsule; Take 1 capsule by mouth 1 (One) Time Per Week.    Hypothyroidism, unspecified type  -     levothyroxine (SYNTHROID, LEVOTHROID) 100 MCG tablet; Take 1 tablet by mouth Daily.    Allergic rhinitis, unspecified seasonality, unspecified trigger  -     fluticasone (FLONASE) 50 MCG/ACT nasal spray; 2 sprays into the nostril(s) as directed by provider  Daily.    Gastroesophageal reflux disease, esophagitis presence not specified  -     omeprazole (priLOSEC) 20 MG capsule; Take 1 capsule by mouth Daily.    Thoracic back pain, unspecified back pain laterality, unspecified chronicity  -     Discontinue: traMADol (ULTRAM) 50 MG tablet; Take 1 tablet by mouth Every 8 (Eight) Hours As Needed for Moderate Pain .  -     traMADol (ULTRAM) 50 MG tablet; Take 1 tablet by mouth Every 8 (Eight) Hours As Needed for Moderate Pain .    Thrush  -     nystatin (MYCOSTATIN) 246247 UNIT/ML suspension; Swish and swallow 5 mL 4 (Four) Times a Day.      Sending her to have an u/s of her gallbladder to confirm the presence of the gallstones.  Then a referral to a surgeon if needed.    bmi is noted to be 32.2.  This is considered obese.  Diet and exercise info provided.    shai report # [id number] is reviewed.

## 2020-06-19 NOTE — PATIENT INSTRUCTIONS
Calorie Counting for Weight Loss  Calories are units of energy. Your body needs a certain amount of calories from food to keep you going throughout the day. When you eat more calories than your body needs, your body stores the extra calories as fat. When you eat fewer calories than your body needs, your body burns fat to get the energy it needs.  Calorie counting means keeping track of how many calories you eat and drink each day. Calorie counting can be helpful if you need to lose weight. If you make sure to eat fewer calories than your body needs, you should lose weight. Ask your health care provider what a healthy weight is for you.  For calorie counting to work, you will need to eat the right number of calories in a day in order to lose a healthy amount of weight per week. A dietitian can help you determine how many calories you need in a day and will give you suggestions on how to reach your calorie goal.  · A healthy amount of weight to lose per week is usually 1-2 lb (0.5-0.9 kg). This usually means that your daily calorie intake should be reduced by 500-750 calories.  · Eating 1,200 - 1,500 calories per day can help most women lose weight.  · Eating 1,500 - 1,800 calories per day can help most men lose weight.  What is my plan?  My goal is to have __________ calories per day.  If I have this many calories per day, I should lose around __________ pounds per week.  What do I need to know about calorie counting?  In order to meet your daily calorie goal, you will need to:  · Find out how many calories are in each food you would like to eat. Try to do this before you eat.  · Decide how much of the food you plan to eat.  · Write down what you ate and how many calories it had. Doing this is called keeping a food log.  To successfully lose weight, it is important to balance calorie counting with a healthy lifestyle that includes regular activity. Aim for 150 minutes of moderate exercise (such as walking) or 75  minutes of vigorous exercise (such as running) each week.  Where do I find calorie information?    The number of calories in a food can be found on a Nutrition Facts label. If a food does not have a Nutrition Facts label, try to look up the calories online or ask your dietitian for help.  Remember that calories are listed per serving. If you choose to have more than one serving of a food, you will have to multiply the calories per serving by the amount of servings you plan to eat. For example, the label on a package of bread might say that a serving size is 1 slice and that there are 90 calories in a serving. If you eat 1 slice, you will have eaten 90 calories. If you eat 2 slices, you will have eaten 180 calories.  How do I keep a food log?  Immediately after each meal, record the following information in your food log:  · What you ate. Don't forget to include toppings, sauces, and other extras on the food.  · How much you ate. This can be measured in cups, ounces, or number of items.  · How many calories each food and drink had.  · The total number of calories in the meal.  Keep your food log near you, such as in a small notebook in your pocket, or use a mobile miguel or website. Some programs will calculate calories for you and show you how many calories you have left for the day to meet your goal.  What are some calorie counting tips?    · Use your calories on foods and drinks that will fill you up and not leave you hungry:  ? Some examples of foods that fill you up are nuts and nut butters, vegetables, lean proteins, and high-fiber foods like whole grains. High-fiber foods are foods with more than 5 g fiber per serving.  ? Drinks such as sodas, specialty coffee drinks, alcohol, and juices have a lot of calories, yet do not fill you up.  · Eat nutritious foods and avoid empty calories. Empty calories are calories you get from foods or beverages that do not have many vitamins or protein, such as candy, sweets, and  "soda. It is better to have a nutritious high-calorie food (such as an avocado) than a food with few nutrients (such as a bag of chips).  · Know how many calories are in the foods you eat most often. This will help you calculate calorie counts faster.  · Pay attention to calories in drinks. Low-calorie drinks include water and unsweetened drinks.  · Pay attention to nutrition labels for \"low fat\" or \"fat free\" foods. These foods sometimes have the same amount of calories or more calories than the full fat versions. They also often have added sugar, starch, or salt, to make up for flavor that was removed with the fat.  · Find a way of tracking calories that works for you. Get creative. Try different apps or programs if writing down calories does not work for you.  What are some portion control tips?  · Know how many calories are in a serving. This will help you know how many servings of a certain food you can have.  · Use a measuring cup to measure serving sizes. You could also try weighing out portions on a kitchen scale. With time, you will be able to estimate serving sizes for some foods.  · Take some time to put servings of different foods on your favorite plates, bowls, and cups so you know what a serving looks like.  · Try not to eat straight from a bag or box. Doing this can lead to overeating. Put the amount you would like to eat in a cup or on a plate to make sure you are eating the right portion.  · Use smaller plates, glasses, and bowls to prevent overeating.  · Try not to multitask (for example, watch TV or use your computer) while eating. If it is time to eat, sit down at a table and enjoy your food. This will help you to know when you are full. It will also help you to be aware of what you are eating and how much you are eating.  What are tips for following this plan?  Reading food labels  · Check the calorie count compared to the serving size. The serving size may be smaller than what you are used to " "eating.  · Check the source of the calories. Make sure the food you are eating is high in vitamins and protein and low in saturated and trans fats.  Shopping  · Read nutrition labels while you shop. This will help you make healthy decisions before you decide to purchase your food.  · Make a grocery list and stick to it.  Cooking  · Try to cook your favorite foods in a healthier way. For example, try baking instead of frying.  · Use low-fat dairy products.  Meal planning  · Use more fruits and vegetables. Half of your plate should be fruits and vegetables.  · Include lean proteins like poultry and fish.  How do I count calories when eating out?  · Ask for smaller portion sizes.  · Consider sharing an entree and sides instead of getting your own entree.  · If you get your own entree, eat only half. Ask for a box at the beginning of your meal and put the rest of your entree in it so you are not tempted to eat it.  · If calories are listed on the menu, choose the lower calorie options.  · Choose dishes that include vegetables, fruits, whole grains, low-fat dairy products, and lean protein.  · Choose items that are boiled, broiled, grilled, or steamed. Stay away from items that are buttered, battered, fried, or served with cream sauce. Items labeled \"crispy\" are usually fried, unless stated otherwise.  · Choose water, low-fat milk, unsweetened iced tea, or other drinks without added sugar. If you want an alcoholic beverage, choose a lower calorie option such as a glass of wine or light beer.  · Ask for dressings, sauces, and syrups on the side. These are usually high in calories, so you should limit the amount you eat.  · If you want a salad, choose a garden salad and ask for grilled meats. Avoid extra toppings like gregory, cheese, or fried items. Ask for the dressing on the side, or ask for olive oil and vinegar or lemon to use as dressing.  · Estimate how many servings of a food you are given. For example, a serving of " cooked rice is ½ cup or about the size of half a baseball. Knowing serving sizes will help you be aware of how much food you are eating at restaurants. The list below tells you how big or small some common portion sizes are based on everyday objects:  ? 1 oz--4 stacked dice.  ? 3 oz--1 deck of cards.  ? 1 tsp--1 die.  ? 1 Tbsp--½ a ping-pong ball.  ? 2 Tbsp--1 ping-pong ball.  ? ½ cup--½ baseball.  ? 1 cup--1 baseball.  Summary  · Calorie counting means keeping track of how many calories you eat and drink each day. If you eat fewer calories than your body needs, you should lose weight.  · A healthy amount of weight to lose per week is usually 1-2 lb (0.5-0.9 kg). This usually means reducing your daily calorie intake by 500-750 calories.  · The number of calories in a food can be found on a Nutrition Facts label. If a food does not have a Nutrition Facts label, try to look up the calories online or ask your dietitian for help.  · Use your calories on foods and drinks that will fill you up, and not on foods and drinks that will leave you hungry.  · Use smaller plates, glasses, and bowls to prevent overeating.  This information is not intended to replace advice given to you by your health care provider. Make sure you discuss any questions you have with your health care provider.  Document Released: 12/18/2006 Document Revised: 09/06/2019 Document Reviewed: 11/17/2017  [employer] Patient Education © 2020 [employer]      Exercising to Lose Weight  Exercise is structured, repetitive physical activity to improve fitness and health. Getting regular exercise is important for everyone. It is especially important if you are overweight. Being overweight increases your risk of heart disease, stroke, diabetes, high blood pressure, and several types of cancer. Reducing your calorie intake and exercising can help you lose weight.  Exercise is usually categorized as moderate or vigorous intensity. To lose weight, most people need  to do a certain amount of moderate-intensity or vigorous-intensity exercise each week.  Moderate-intensity exercise    Moderate-intensity exercise is any activity that gets you moving enough to burn at least three times more energy (calories) than if you were sitting.  Examples of moderate exercise include:  · Walking a mile in 15 minutes.  · Doing light yard work.  · Biking at an easy pace.  Most people should get at least 150 minutes (2 hours and 30 minutes) a week of moderate-intensity exercise to maintain their body weight.  Vigorous-intensity exercise  Vigorous-intensity exercise is any activity that gets you moving enough to burn at least six times more calories than if you were sitting. When you exercise at this intensity, you should be working hard enough that you are not able to carry on a conversation.  Examples of vigorous exercise include:  · Running.  · Playing a team sport, such as football, basketball, and soccer.  · Jumping rope.  Most people should get at least 75 minutes (1 hour and 15 minutes) a week of vigorous-intensity exercise to maintain their body weight.  How can exercise affect me?  When you exercise enough to burn more calories than you eat, you lose weight. Exercise also reduces body fat and builds muscle. The more muscle you have, the more calories you burn. Exercise also:  · Improves mood.  · Reduces stress and tension.  · Improves your overall fitness, flexibility, and endurance.  · Increases bone strength.  The amount of exercise you need to lose weight depends on:  · Your age.  · The type of exercise.  · Any health conditions you have.  · Your overall physical ability.  Talk to your health care provider about how much exercise you need and what types of activities are safe for you.  What actions can I take to lose weight?  Nutrition    · Make changes to your diet as told by your health care provider or diet and nutrition specialist (dietitian). This may include:  ? Eating fewer  calories.  ? Eating more protein.  ? Eating less unhealthy fats.  ? Eating a diet that includes fresh fruits and vegetables, whole grains, low-fat dairy products, and lean protein.  ? Avoiding foods with added fat, salt, and sugar.  · Drink plenty of water while you exercise to prevent dehydration or heat stroke.  Activity  · Choose an activity that you enjoy and set realistic goals. Your health care provider can help you make an exercise plan that works for you.  · Exercise at a moderate or vigorous intensity most days of the week.  ? The intensity of exercise may vary from person to person. You can tell how intense a workout is for you by paying attention to your breathing and heartbeat. Most people will notice their breathing and heartbeat get faster with more intense exercise.  · Do resistance training twice each week, such as:  ? Push-ups.  ? Sit-ups.  ? Lifting weights.  ? Using resistance bands.  · Getting short amounts of exercise can be just as helpful as long structured periods of exercise. If you have trouble finding time to exercise, try to include exercise in your daily routine.  ? Get up, stretch, and walk around every 30 minutes throughout the day.  ? Go for a walk during your lunch break.  ? Park your car farther away from your destination.  ? If you take public transportation, get off one stop early and walk the rest of the way.  ? Make phone calls while standing up and walking around.  ? Take the stairs instead of elevators or escalators.  · Wear comfortable clothes and shoes with good support.  · Do not exercise so much that you hurt yourself, feel dizzy, or get very short of breath.  Where to find more information  · [place] Department of Health and Human Services: www.hhs.gov  · Centers for Disease Control and Prevention (CDC): www.cdc.gov  Contact a health care provider:  · Before starting a new exercise program.  · If you have questions or concerns about your weight.  · If you have a medical  problem that keeps you from exercising.  Get help right away if you have any of the following while exercising:  · Injury.  · Dizziness.  · Difficulty breathing or shortness of breath that does not go away when you stop exercising.  · Chest pain.  · Rapid heartbeat.  Summary  · Being overweight increases your risk of heart disease, stroke, diabetes, high blood pressure, and several types of cancer.  · Losing weight happens when you burn more calories than you eat.  · Reducing the amount of calories you eat in addition to getting regular moderate or vigorous exercise each week helps you lose weight.  This information is not intended to replace advice given to you by your health care provider. Make sure you discuss any questions you have with your health care provider.  Document Released: 01/20/2012 Document Revised: 12/31/2018 Document Reviewed: 12/31/2018  [employer] Patient Education © 2020 [employer]

## 2020-06-26 ENCOUNTER — TELEPHONE (OUTPATIENT)
Dept: FAMILY MEDICINE CLINIC | Facility: CLINIC | Age: 75
End: 2020-06-26

## 2020-06-26 NOTE — TELEPHONE ENCOUNTER
Patient advised that she was supposed to have some type of testing today 06/26/2020 and she is unable to make it due to illness and would like for it to be rescheduled.     Please contact and advise.

## 2020-06-26 NOTE — TELEPHONE ENCOUNTER
Rescheduled for 7/6/20 at 8 am at [facility] patient is aware

## 2020-07-06 ENCOUNTER — TELEPHONE (OUTPATIENT)
Dept: FAMILY MEDICINE CLINIC | Facility: CLINIC | Age: 75
End: 2020-07-06

## 2020-07-06 DIAGNOSIS — K74.60 HEPATIC CIRRHOSIS, UNSPECIFIED HEPATIC CIRRHOSIS TYPE, UNSPECIFIED WHETHER ASCITES PRESENT (HCC): Primary | ICD-10-CM

## 2020-07-06 NOTE — TELEPHONE ENCOUNTER
Patient aware also aware per Rose Marie needs to be seen by Gastro ASAP.  Will put in order for Gastro in [place].

## 2020-07-09 ENCOUNTER — LAB (OUTPATIENT)
Dept: LAB | Facility: HOSPITAL | Age: 75
End: 2020-07-09

## 2020-07-09 ENCOUNTER — OFFICE VISIT (OUTPATIENT)
Dept: GASTROENTEROLOGY | Facility: CLINIC | Age: 75
End: 2020-07-09

## 2020-07-09 VITALS
WEIGHT: 218 LBS | HEIGHT: 67 IN | BODY MASS INDEX: 34.21 KG/M2 | SYSTOLIC BLOOD PRESSURE: 109 MMHG | DIASTOLIC BLOOD PRESSURE: 53 MMHG | HEART RATE: 85 BPM

## 2020-07-09 DIAGNOSIS — K74.60 CIRRHOSIS OF LIVER WITHOUT ASCITES, UNSPECIFIED HEPATIC CIRRHOSIS TYPE (HCC): Primary | ICD-10-CM

## 2020-07-09 DIAGNOSIS — K80.20 GALLSTONES: ICD-10-CM

## 2020-07-09 DIAGNOSIS — R11.2 NAUSEA AND VOMITING, INTRACTABILITY OF VOMITING NOT SPECIFIED, UNSPECIFIED VOMITING TYPE: ICD-10-CM

## 2020-07-09 DIAGNOSIS — R93.5 ABNORMAL CT OF THE ABDOMEN: ICD-10-CM

## 2020-07-09 PROCEDURE — 80074 ACUTE HEPATITIS PANEL: CPT | Performed by: INTERNAL MEDICINE

## 2020-07-09 PROCEDURE — 99204 OFFICE O/P NEW MOD 45 MIN: CPT | Performed by: INTERNAL MEDICINE

## 2020-07-09 PROCEDURE — 36415 COLL VENOUS BLD VENIPUNCTURE: CPT | Performed by: INTERNAL MEDICINE

## 2020-07-09 RX ORDER — DEXTROSE AND SODIUM CHLORIDE 5; .45 G/100ML; G/100ML
30 INJECTION, SOLUTION INTRAVENOUS CONTINUOUS PRN
Status: CANCELLED | OUTPATIENT
Start: 2020-07-14

## 2020-07-09 NOTE — H&P (VIEW-ONLY)
[facility]      Chief Complaint:   Chief Complaint   Patient presents with   • Diarrhea   • Cholelithiasis   • Liver cirrhosis   • Fatigue   • Anorexia       Subjective     HPI:   Ms. Hyatt is a 75-year-old  female with past medical history of hypothyroidism, gastritis, degenerative joint disease of shoulder, depression, eustachian dysfunction, vasovagal syncope presenting for evaluation for newly diagnosed cirrhosis.  She apparently had a bout of fever associated with nausea, vomiting and syncope last month with subsequent CT abdomen and pelvis consistent with diverticulitis of the left colon, choledocholithiasis and cirrhosis.  Nausea and vomiting has resolved subsequently and she denied diarrhea, constipation, rectal bleeding or melena.  She took antibiotics for 1 week and stopped due to loss of appetite and oral and vaginal yeast infection.  Patient denied history of alcohol usage.  Has history of weight gain for past 37 years.  Denied history of hepatitis.  She started on diet and exercise regimen for past month and lost 12 pounds weight.  Most recent lab work included ALT 33 AST 79 alk phos 163 bili 1.2.    Past Medical History:   Past Medical History:   Diagnosis Date   • Acquired hypothyroidism    • Acute bronchitis    • Acute gastritis    • Acute maxillary sinusitis    • Acute maxillary sinusitis, unspecified    • Acute serous otitis media     R   • Acute sinusitis     Could be R mastoid    • Allergic rhinitis    • Anxiety state    • Chronic sinusitis    • Conjunctivitis    • Cough    • Degenerative joint disease of shoulder region    • Depressive disorder     improving   • Diarrhea    • Dysfunction of eustachian tube    • Epigastric pain    • Female stress incontinence    • Forgetfulness    • Functional diarrhea    • Gynecological Examination    • Knee joint replaced by other means    • Malaise and fatigue    • Menopause    • Nausea and vomiting    • Other Chest pain    •  Polyp of colon    • Preop examination, unspecified    • Rhinitis medicamentosa    • Shoulder pain    • Skin tag- removal    • Subclinical hypothyroidism    • Vagal reaction        Past Surgical History:  Past Surgical History:   Procedure Laterality Date   • COLONOSCOPY  07/16/2012   • COLONOSCOPY  01/13/2017   • ENDOSCOPY AND COLONOSCOPY      Diverticulum in sigmoid colon. 2 polyps in ascending colon & descending colon; removed by snare cautery polypectomy   • HYSTERECTOMY     • INJECTION OF MEDICATION      celestone (betamethasone) 12mg   • INJECTION OF MEDICATION      Depo Medrol 2ml   • INJECTION OF MEDICATION      Rocephin 1gm   • JOINT REPLACEMENT Bilateral 2015 2013    basim total knee replacements   • REPLACEMENT TOTAL KNEE     • SINOSCOPY PROCEDURE Bilateral 4/16/2018    Procedure: BILATERAL ENDOSCOPIC EHTMOIDECTOMY, MAXILLARY SINUS ANTROSTOMY, FRONTAL RECESS EXPLORATION, POSSIBLE SEPTOPLASY         BRAINLAB;  Surgeon: Daniel Griffith MD;  Location: [facility];  Service: ENT   • TOTAL ABDOMINAL HYSTERECTOMY WITH SALPINGO OOPHORECTOMY  1985   • UPPER GASTROINTESTINAL ENDOSCOPY  07/16/2012   • UPPER GASTROINTESTINAL ENDOSCOPY  01/13/2017       Family History:  Family History   Problem Relation Age of Onset   • Hypertension Other    • Stroke Other    • Diabetes Other    • Ulcers Other    • Cancer Other    • Stroke Father    • Sinusitis Mother        Social History:   reports that she quit smoking about 40 years ago. She has never used smokeless tobacco. She reports that she drank alcohol. She reports that she does not use drugs.    Medications:   Prior to Admission medications    Medication Sig Start Date End Date Taking? Authorizing Provider   cetirizine (zyrTEC) 10 MG tablet Take 10 mg by mouth Daily.   Yes Provider, MD Elinor   cycloSPORINE (RESTASIS) 0.05 % ophthalmic emulsion Apply 1 drop to eye(s) as directed by provider Every 12 (Twelve) Hours. 6/16/20  Yes RoseM arie Kelley, APRN      fluticasone (FLONASE) 50 MCG/ACT nasal spray 2 sprays into the nostril(s) as directed by provider Daily. 6/16/20  Yes Rose Marie Kelley APRN   levothyroxine (SYNTHROID, LEVOTHROID) 100 MCG tablet Take 1 tablet by mouth Daily. 6/16/20  Yes Rose Marie Kelley APRN   omeprazole (priLOSEC) 20 MG capsule Take 1 capsule by mouth Daily. 6/16/20  Yes Rose Marie Kelley APRN   ondansetron (ZOFRAN) 4 MG tablet Take 1 tablet by mouth 4 (Four) Times a Day As Needed for Nausea. 6/16/20  Yes Rose Marie Kleley APRN   traMADol (ULTRAM) 50 MG tablet Take 1 tablet by mouth Every 8 (Eight) Hours As Needed for Moderate Pain . 6/16/20  Yes Rose Marie Kelley APRN   vitamin D (ERGOCALCIFEROL) 1.25 MG (84152 UT) capsule capsule Take 1 capsule by mouth 1 (One) Time Per Week. 6/16/20  Yes Rose Marie Kelley APRN   polyethylene glycol (GoLYTELY) 236 g solution Starting at noon on day prior to procedure, drink 8 ounces every 30 minutes until all gone or stools are clear. May add flavor packet. 7/9/20   Linda Joseph MD   albuterol sulfate  (90 Base) MCG/ACT inhaler Inhale 2 puffs Every 4 (Four) Hours As Needed for Wheezing. 2/4/20 7/9/20  Rose Marie Kelley APRN   nystatin (MYCOSTATIN) 617736 UNIT/ML suspension Swish and swallow 5 mL 4 (Four) Times a Day. 6/16/20 7/9/20  Rose Marie Kelley APRN       Allergies:  Phenergan [promethazine]    ROS:    Review of Systems   Constitutional: Positive for fever and unexpected weight change. Negative for chills and fatigue.   HENT: Negative for congestion, ear discharge, hearing loss, nosebleeds and sore throat.    Eyes: Negative for pain, discharge and redness.   Respiratory: Negative for cough, chest tightness, shortness of breath and wheezing.    Cardiovascular: Negative for chest pain and palpitations.   Gastrointestinal: Positive for nausea and vomiting. Negative for abdominal distention, abdominal pain, blood in stool, constipation and diarrhea.   Endocrine: Negative for cold  "intolerance, polydipsia, polyphagia and polyuria.   Genitourinary: Negative for dysuria, flank pain, frequency, hematuria and urgency.   Musculoskeletal: Negative for arthralgias, back pain, joint swelling and myalgias.   Skin: Negative for color change, pallor and rash.   Neurological: Positive for syncope. Negative for tremors, seizures, weakness and headaches.   Hematological: Negative for adenopathy. Does not bruise/bleed easily.   Psychiatric/Behavioral: Negative for behavioral problems, confusion, dysphoric mood, hallucinations and suicidal ideas. The patient is not nervous/anxious.      Objective     Blood pressure 109/53, pulse 85, height 170.2 cm (67\"), weight 98.9 kg (218 lb).    Physical Exam   Constitutional: She is oriented to person, place, and time. She appears well-developed and well-nourished.   HENT:   Head: Normocephalic and atraumatic.   Mouth/Throat: Oropharynx is clear and moist.   Eyes: Pupils are equal, round, and reactive to light. Conjunctivae and EOM are normal.   Neck: Normal range of motion. Neck supple. No thyromegaly present.   Cardiovascular: Normal rate, regular rhythm and normal heart sounds.   No murmur heard.  Pulmonary/Chest: Effort normal and breath sounds normal. She has no wheezes.   Abdominal: Soft. Bowel sounds are normal. She exhibits no distension and no mass. There is no tenderness. No hernia.   Genitourinary:   Genitourinary Comments: No lesions noted   Musculoskeletal: Normal range of motion. She exhibits no edema or tenderness.   Lymphadenopathy:     She has no cervical adenopathy.   Neurological: She is alert and oriented to person, place, and time. No cranial nerve deficit.   Skin: Skin is warm and dry. No rash noted.   Psychiatric: She has a normal mood and affect. Thought content normal.      Extremities: No edema, cyanosis or clubbing.    Assessment/Plan    1.  Fever, nausea, vomiting and syncope, likely due to diverticulitis or colitis.  Patient feels better " currently.  2.  Abnormal CT abdomen and pelvis needs further evaluation for possible colitis and neoplasia.  Schedule colonoscopy.  3.  Cirrhosis, likely due to nonalcoholic steatohepatitis.  Continue exercise and diet regimen.  Obtain hepatitis panel.  4.  Variceal screening, schedule EGD.  5.  Obesity with recent weight loss, continue exercise and diet regimen.  [place] was seen today for diarrhea, cholelithiasis, liver cirrhosis, fatigue and anorexia.    Diagnoses and all orders for this visit:    Cirrhosis of liver without ascites, unspecified hepatic cirrhosis type (CMS/HCC)  -     Case Request; Standing  -     Case Request  -     Hepatitis Panel, Acute  -     Hepatitis Panel, Acute  -     Hep B Confirmation Tube    Nausea and vomiting, intractability of vomiting not specified, unspecified vomiting type  -     Case Request; Standing  -     Case Request    Abnormal CT of the abdomen  -     Case Request; Standing  -     Case Request    Other orders  -     Follow Anesthesia Guidelines / Standing Orders; Future  -     Obtain Informed Consent; Future        ESOPHAGOGASTRODUODENOSCOPY (N/A), COLONOSCOPY (N/A)     Diagnosis Plan   1. Cirrhosis of liver without ascites, unspecified hepatic cirrhosis type (CMS/HCC)  Case Request    dextrose 5 % and sodium chloride 0.45 % infusion    Case Request    Hepatitis Panel, Acute    Hepatitis Panel, Acute    Hep B Confirmation Tube   2. Nausea and vomiting, intractability of vomiting not specified, unspecified vomiting type  Case Request    dextrose 5 % and sodium chloride 0.45 % infusion    Case Request   3. Abnormal CT of the abdomen  Case Request    dextrose 5 % and sodium chloride 0.45 % infusion    Case Request       Anticipated Surgical Procedure:  Orders Placed This Encounter   Procedures   • Hepatitis Panel, Acute   • Hepatitis Panel, Acute   • Hep B Confirmation Tube   • Follow Anesthesia Guidelines / Standing Orders     Standing Status:   Future   • Obtain Informed  Consent     Standing Status:   Future     Order Specific Question:   Informed Consent Given For     Answer:   egd and colonoscopy       The risks, benefits, and alternatives of this procedure have been discussed with the patient or the responsible party- the patient understands and agrees to proceed.            This document has been electronically signed by Linda Joseph MD on July 9, 2020 17:22

## 2020-07-09 NOTE — PROGRESS NOTES
[facility] ([facility]      Chief Complaint:   Chief Complaint   Patient presents with   • Diarrhea   • Cholelithiasis   • Liver cirrhosis   • Fatigue   • Anorexia       Subjective     HPI:   Ms. Hyatt is a 75-year-old  female with past medical history of hypothyroidism, gastritis, degenerative joint disease of shoulder, depression, eustachian dysfunction, vasovagal syncope presenting for evaluation for newly diagnosed cirrhosis.  She apparently had a bout of fever associated with nausea, vomiting and syncope last month with subsequent CT abdomen and pelvis consistent with diverticulitis of the left colon, choledocholithiasis and cirrhosis.  Nausea and vomiting has resolved subsequently and she denied diarrhea, constipation, rectal bleeding or melena.  She took antibiotics for 1 week and stopped due to loss of appetite and oral and vaginal yeast infection.  Patient denied history of alcohol usage.  Has history of weight gain for past 37 years.  Denied history of hepatitis.  She started on diet and exercise regimen for past month and lost 12 pounds weight.  Most recent lab work included ALT 33 AST 79 alk phos 163 bili 1.2.    Past Medical History:   Past Medical History:   Diagnosis Date   • Acquired hypothyroidism    • Acute bronchitis    • Acute gastritis    • Acute maxillary sinusitis    • Acute maxillary sinusitis, unspecified    • Acute serous otitis media     R   • Acute sinusitis     Could be R mastoid    • Allergic rhinitis    • Anxiety state    • Chronic sinusitis    • Conjunctivitis    • Cough    • Degenerative joint disease of shoulder region    • Depressive disorder     improving   • Diarrhea    • Dysfunction of eustachian tube    • Epigastric pain    • Female stress incontinence    • Forgetfulness    • Functional diarrhea    • Gynecological Examination    • Knee joint replaced by other means    • Malaise and fatigue    • Menopause    • Nausea and vomiting    • Other Chest pain    •  Polyp of colon    • Preop examination, unspecified    • Rhinitis medicamentosa    • Shoulder pain    • Skin tag- removal    • Subclinical hypothyroidism    • Vagal reaction        Past Surgical History:  Past Surgical History:   Procedure Laterality Date   • COLONOSCOPY  07/16/2012   • COLONOSCOPY  01/13/2017   • ENDOSCOPY AND COLONOSCOPY      Diverticulum in sigmoid colon. 2 polyps in ascending colon & descending colon; removed by snare cautery polypectomy   • HYSTERECTOMY     • INJECTION OF MEDICATION      celestone (betamethasone) 12mg   • INJECTION OF MEDICATION      Depo Medrol 2ml   • INJECTION OF MEDICATION      Rocephin 1gm   • JOINT REPLACEMENT Bilateral 2015 2013    basim total knee replacements   • REPLACEMENT TOTAL KNEE     • SINOSCOPY PROCEDURE Bilateral 4/16/2018    Procedure: BILATERAL ENDOSCOPIC EHTMOIDECTOMY, MAXILLARY SINUS ANTROSTOMY, FRONTAL RECESS EXPLORATION, POSSIBLE SEPTOPLASY         BRAINLAB;  Surgeon: Daniel Griffith MD;  Location: [facility];  Service: ENT   • TOTAL ABDOMINAL HYSTERECTOMY WITH SALPINGO OOPHORECTOMY  1985   • UPPER GASTROINTESTINAL ENDOSCOPY  07/16/2012   • UPPER GASTROINTESTINAL ENDOSCOPY  01/13/2017       Family History:  Family History   Problem Relation Age of Onset   • Hypertension Other    • Stroke Other    • Diabetes Other    • Ulcers Other    • Cancer Other    • Stroke Father    • Sinusitis Mother        Social History:   reports that she quit smoking about 40 years ago. She has never used smokeless tobacco. She reports that she drank alcohol. She reports that she does not use drugs.    Medications:   Prior to Admission medications    Medication Sig Start Date End Date Taking? Authorizing Provider   cetirizine (zyrTEC) 10 MG tablet Take 10 mg by mouth Daily.   Yes Provider, MD Elinor   cycloSPORINE (RESTASIS) 0.05 % ophthalmic emulsion Apply 1 drop to eye(s) as directed by provider Every 12 (Twelve) Hours. 6/16/20  Yes Rose Marie Kelley, APRN    fluticasone (FLONASE) 50 MCG/ACT nasal spray 2 sprays into the nostril(s) as directed by provider Daily. 6/16/20  Yes Rose Marie Kelley APRN   levothyroxine (SYNTHROID, LEVOTHROID) 100 MCG tablet Take 1 tablet by mouth Daily. 6/16/20  Yes Rose Marie Kelley APRN   omeprazole (priLOSEC) 20 MG capsule Take 1 capsule by mouth Daily. 6/16/20  Yes Rose Marie Kelley APRN   ondansetron (ZOFRAN) 4 MG tablet Take 1 tablet by mouth 4 (Four) Times a Day As Needed for Nausea. 6/16/20  Yes Rose Marie Kelley APRN   traMADol (ULTRAM) 50 MG tablet Take 1 tablet by mouth Every 8 (Eight) Hours As Needed for Moderate Pain . 6/16/20  Yes Rose Marie Kelley APRN   vitamin D (ERGOCALCIFEROL) 1.25 MG (93393 UT) capsule capsule Take 1 capsule by mouth 1 (One) Time Per Week. 6/16/20  Yes Rose Marie Kelley APRN   polyethylene glycol (GoLYTELY) 236 g solution Starting at noon on day prior to procedure, drink 8 ounces every 30 minutes until all gone or stools are clear. May add flavor packet. 7/9/20   Linda Joseph MD   albuterol sulfate  (90 Base) MCG/ACT inhaler Inhale 2 puffs Every 4 (Four) Hours As Needed for Wheezing. 2/4/20 7/9/20  Rose Marie Kelley APRN   nystatin (MYCOSTATIN) 120154 UNIT/ML suspension Swish and swallow 5 mL 4 (Four) Times a Day. 6/16/20 7/9/20  Rose Marie Kelley APRN       Allergies:  Phenergan [promethazine]    ROS:    Review of Systems   Constitutional: Positive for fever and unexpected weight change. Negative for chills and fatigue.   HENT: Negative for congestion, ear discharge, hearing loss, nosebleeds and sore throat.    Eyes: Negative for pain, discharge and redness.   Respiratory: Negative for cough, chest tightness, shortness of breath and wheezing.    Cardiovascular: Negative for chest pain and palpitations.   Gastrointestinal: Positive for nausea and vomiting. Negative for abdominal distention, abdominal pain, blood in stool, constipation and diarrhea.   Endocrine: Negative for cold  "intolerance, polydipsia, polyphagia and polyuria.   Genitourinary: Negative for dysuria, flank pain, frequency, hematuria and urgency.   Musculoskeletal: Negative for arthralgias, back pain, joint swelling and myalgias.   Skin: Negative for color change, pallor and rash.   Neurological: Positive for syncope. Negative for tremors, seizures, weakness and headaches.   Hematological: Negative for adenopathy. Does not bruise/bleed easily.   Psychiatric/Behavioral: Negative for behavioral problems, confusion, dysphoric mood, hallucinations and suicidal ideas. The patient is not nervous/anxious.      Objective     Blood pressure 109/53, pulse 85, height 170.2 cm (67\"), weight 98.9 kg (218 lb).    Physical Exam   Constitutional: She is oriented to person, place, and time. She appears well-developed and well-nourished.   HENT:   Head: Normocephalic and atraumatic.   Mouth/Throat: Oropharynx is clear and moist.   Eyes: Pupils are equal, round, and reactive to light. Conjunctivae and EOM are normal.   Neck: Normal range of motion. Neck supple. No thyromegaly present.   Cardiovascular: Normal rate, regular rhythm and normal heart sounds.   No murmur heard.  Pulmonary/Chest: Effort normal and breath sounds normal. She has no wheezes.   Abdominal: Soft. Bowel sounds are normal. She exhibits no distension and no mass. There is no tenderness. No hernia.   Genitourinary:   Genitourinary Comments: No lesions noted   Musculoskeletal: Normal range of motion. She exhibits no edema or tenderness.   Lymphadenopathy:     She has no cervical adenopathy.   Neurological: She is alert and oriented to person, place, and time. No cranial nerve deficit.   Skin: Skin is warm and dry. No rash noted.   Psychiatric: She has a normal mood and affect. Thought content normal.      Extremities: No edema, cyanosis or clubbing.    Assessment/Plan    1.  Fever, nausea, vomiting and syncope, likely due to diverticulitis or colitis.  Patient feels better " currently.  2.  Abnormal CT abdomen and pelvis needs further evaluation for possible colitis and neoplasia.  Schedule colonoscopy.  3.  Cirrhosis, likely due to nonalcoholic steatohepatitis.  Continue exercise and diet regimen.  Obtain hepatitis panel.  4.  Variceal screening, schedule EGD.  5.  Obesity with recent weight loss, continue exercise and diet regimen.  [place] was seen today for diarrhea, cholelithiasis, liver cirrhosis, fatigue and anorexia.    Diagnoses and all orders for this visit:    Cirrhosis of liver without ascites, unspecified hepatic cirrhosis type (CMS/HCC)  -     Case Request; Standing  -     Case Request  -     Hepatitis Panel, Acute  -     Hepatitis Panel, Acute  -     Hep B Confirmation Tube    Nausea and vomiting, intractability of vomiting not specified, unspecified vomiting type  -     Case Request; Standing  -     Case Request    Abnormal CT of the abdomen  -     Case Request; Standing  -     Case Request    Other orders  -     Follow Anesthesia Guidelines / Standing Orders; Future  -     Obtain Informed Consent; Future        ESOPHAGOGASTRODUODENOSCOPY (N/A), COLONOSCOPY (N/A)     Diagnosis Plan   1. Cirrhosis of liver without ascites, unspecified hepatic cirrhosis type (CMS/HCC)  Case Request    dextrose 5 % and sodium chloride 0.45 % infusion    Case Request    Hepatitis Panel, Acute    Hepatitis Panel, Acute    Hep B Confirmation Tube   2. Nausea and vomiting, intractability of vomiting not specified, unspecified vomiting type  Case Request    dextrose 5 % and sodium chloride 0.45 % infusion    Case Request   3. Abnormal CT of the abdomen  Case Request    dextrose 5 % and sodium chloride 0.45 % infusion    Case Request       Anticipated Surgical Procedure:  Orders Placed This Encounter   Procedures   • Hepatitis Panel, Acute   • Hepatitis Panel, Acute   • Hep B Confirmation Tube   • Follow Anesthesia Guidelines / Standing Orders     Standing Status:   Future   • Obtain Informed  Consent     Standing Status:   Future     Order Specific Question:   Informed Consent Given For     Answer:   egd and colonoscopy       The risks, benefits, and alternatives of this procedure have been discussed with the patient or the responsible party- the patient understands and agrees to proceed.            This document has been electronically signed by Linda Joseph MD on July 9, 2020 17:22

## 2020-07-09 NOTE — PATIENT INSTRUCTIONS

## 2020-07-10 LAB
HAV IGM SERPL QL IA: NORMAL
HBV CORE IGM SERPL QL IA: NORMAL
HBV SURFACE AG SERPL QL IA: NORMAL
HCV AB SER DONR QL: NORMAL
HOLD SPECIMEN: NORMAL

## 2020-07-11 PROCEDURE — C9803 HOPD COVID-19 SPEC COLLECT: HCPCS | Performed by: INTERNAL MEDICINE

## 2020-07-11 PROCEDURE — U0003 INFECTIOUS AGENT DETECTION BY NUCLEIC ACID (DNA OR RNA); SEVERE ACUTE RESPIRATORY SYNDROME CORONAVIRUS 2 (SARS-COV-2) (CORONAVIRUS DISEASE [COVID-19]), AMPLIFIED PROBE TECHNIQUE, MAKING USE OF HIGH THROUGHPUT TECHNOLOGIES AS DESCRIBED BY CMS-2020-01-R: HCPCS | Performed by: INTERNAL MEDICINE

## 2020-07-12 LAB
COVID LABCORP PRIORITY: NORMAL
SARS-COV-2 RNA RESP QL NAA+PROBE: NOT DETECTED

## 2020-07-14 ENCOUNTER — ANESTHESIA EVENT (OUTPATIENT)
Dept: GASTROENTEROLOGY | Facility: HOSPITAL | Age: 75
End: 2020-07-14

## 2020-07-14 ENCOUNTER — HOSPITAL ENCOUNTER (OUTPATIENT)
Facility: HOSPITAL | Age: 75
Setting detail: HOSPITAL OUTPATIENT SURGERY
Discharge: HOME | End: 2020-07-14
Attending: INTERNAL MEDICINE | Admitting: INTERNAL MEDICINE

## 2020-07-14 ENCOUNTER — ANESTHESIA (OUTPATIENT)
Dept: GASTROENTEROLOGY | Facility: HOSPITAL | Age: 75
End: 2020-07-14

## 2020-07-14 VITALS
SYSTOLIC BLOOD PRESSURE: 119 MMHG | BODY MASS INDEX: 33.56 KG/M2 | HEART RATE: 80 BPM | TEMPERATURE: 96.9 F | HEIGHT: 67 IN | RESPIRATION RATE: 18 BRPM | WEIGHT: 213.8 LBS | DIASTOLIC BLOOD PRESSURE: 53 MMHG | OXYGEN SATURATION: 98 %

## 2020-07-14 DIAGNOSIS — R93.5 ABNORMAL CT OF THE ABDOMEN: ICD-10-CM

## 2020-07-14 DIAGNOSIS — K74.60 CIRRHOSIS OF LIVER WITHOUT ASCITES, UNSPECIFIED HEPATIC CIRRHOSIS TYPE (HCC): ICD-10-CM

## 2020-07-14 DIAGNOSIS — R11.2 NAUSEA AND VOMITING, INTRACTABILITY OF VOMITING NOT SPECIFIED, UNSPECIFIED VOMITING TYPE: ICD-10-CM

## 2020-07-14 PROCEDURE — 25010000002 PROPOFOL 10 MG/ML EMULSION: Performed by: NURSE ANESTHETIST, CERTIFIED REGISTERED

## 2020-07-14 PROCEDURE — 43239 EGD BIOPSY SINGLE/MULTIPLE: CPT | Performed by: INTERNAL MEDICINE

## 2020-07-14 PROCEDURE — 45380 COLONOSCOPY AND BIOPSY: CPT | Performed by: INTERNAL MEDICINE

## 2020-07-14 PROCEDURE — 88305 TISSUE EXAM BY PATHOLOGIST: CPT

## 2020-07-14 PROCEDURE — 45385 COLONOSCOPY W/LESION REMOVAL: CPT | Performed by: INTERNAL MEDICINE

## 2020-07-14 RX ORDER — PROPOFOL 10 MG/ML
VIAL (ML) INTRAVENOUS AS NEEDED
Status: DISCONTINUED | OUTPATIENT
Start: 2020-07-14 | End: 2020-07-14 | Stop reason: SURG

## 2020-07-14 RX ORDER — LIDOCAINE HYDROCHLORIDE 20 MG/ML
INJECTION, SOLUTION INTRAVENOUS AS NEEDED
Status: DISCONTINUED | OUTPATIENT
Start: 2020-07-14 | End: 2020-07-14 | Stop reason: SURG

## 2020-07-14 RX ORDER — DEXTROSE AND SODIUM CHLORIDE 5; .45 G/100ML; G/100ML
30 INJECTION, SOLUTION INTRAVENOUS CONTINUOUS PRN
Status: DISCONTINUED | OUTPATIENT
Start: 2020-07-14 | End: 2020-07-14 | Stop reason: HOSPADM

## 2020-07-14 RX ADMIN — PROPOFOL 20 MG: 10 INJECTION, EMULSION INTRAVENOUS at 13:06

## 2020-07-14 RX ADMIN — PROPOFOL 20 MG: 10 INJECTION, EMULSION INTRAVENOUS at 13:08

## 2020-07-14 RX ADMIN — PROPOFOL 20 MG: 10 INJECTION, EMULSION INTRAVENOUS at 13:03

## 2020-07-14 RX ADMIN — PROPOFOL 70 MG: 10 INJECTION, EMULSION INTRAVENOUS at 13:01

## 2020-07-14 RX ADMIN — PROPOFOL 20 MG: 10 INJECTION, EMULSION INTRAVENOUS at 13:04

## 2020-07-14 RX ADMIN — PROPOFOL 20 MG: 10 INJECTION, EMULSION INTRAVENOUS at 13:12

## 2020-07-14 RX ADMIN — LIDOCAINE HYDROCHLORIDE 100 MG: 20 INJECTION, SOLUTION INTRAVENOUS at 13:01

## 2020-07-14 RX ADMIN — PROPOFOL 30 MG: 10 INJECTION, EMULSION INTRAVENOUS at 13:10

## 2020-07-14 RX ADMIN — PROPOFOL 20 MG: 10 INJECTION, EMULSION INTRAVENOUS at 13:19

## 2020-07-14 RX ADMIN — PROPOFOL 20 MG: 10 INJECTION, EMULSION INTRAVENOUS at 13:22

## 2020-07-14 RX ADMIN — DEXTROSE AND SODIUM CHLORIDE 30 ML/HR: 5; 450 INJECTION, SOLUTION INTRAVENOUS at 12:46

## 2020-07-14 RX ADMIN — PROPOFOL 30 MG: 10 INJECTION, EMULSION INTRAVENOUS at 13:14

## 2020-07-14 RX ADMIN — PROPOFOL 20 MG: 10 INJECTION, EMULSION INTRAVENOUS at 13:16

## 2020-07-14 NOTE — ANESTHESIA PREPROCEDURE EVALUATION
Anesthesia Evaluation     Patient summary reviewed and Nursing notes reviewed   NPO Solid Status: > 8 hours  NPO Liquid Status: > 8 hours           Airway   Mallampati: II  TM distance: >3 FB  Neck ROM: full  No difficulty expected  Dental    (+) poor dentition    Pulmonary - normal exam   (+) a smoker Former,   Cardiovascular - normal exam    (-) past MI, dysrhythmias    ROS comment: ECHO 7/2019    ·Left ventricular systolic function is normal. Calculated LVEF is 62%. Mild concentric hypertrophy with normal diastolic function.  ·Right ventricular systolic function is normal. Borderline dilated right ventricular cavity.  ·No significant valvular abnormalities.  ·There is no evidence of pericardial effusion    Neuro/Psych  (+) syncope, psychiatric history Depression,     GI/Hepatic/Renal/Endo    (+) obesity,   liver disease, thyroid problem hypothyroidism    Musculoskeletal     Abdominal  - normal exam   Substance History      OB/GYN          Other   arthritis,                    Anesthesia Plan    ASA 3     MAC     intravenous induction     Anesthetic plan, all risks, benefits, and alternatives have been provided, discussed and informed consent has been obtained with: patient.    Plan discussed with CRNA.

## 2020-07-14 NOTE — ANESTHESIA POSTPROCEDURE EVALUATION
Patient: Pita Hyatt    Procedure Summary     Date:  07/14/20 Room / Location:  [facility] ENDOSCOPY 1 / [facility] ENDOSCOPY    Anesthesia Start:  1259 Anesthesia Stop:  1328    Procedures:       ESOPHAGOGASTRODUODENOSCOPY (N/A )      COLONOSCOPY (N/A ) Diagnosis:       Cirrhosis of liver without ascites, unspecified hepatic cirrhosis type (CMS/HCC)      Nausea and vomiting, intractability of vomiting not specified, unspecified vomiting type      Abnormal CT of the abdomen      (Cirrhosis of liver without ascites, unspecified hepatic cirrhosis type (CMS/HCC) [K74.60])      (Nausea and vomiting, intractability of vomiting not specified, unspecified vomiting type [R11.2])      (Abnormal CT of the abdomen [R93.5])    Surgeon:  Linda Joseph MD Provider:  Amber Fan CRNA    Anesthesia Type:  MAC ASA Status:  3          Anesthesia Type: MAC    Vitals  No vitals data found for the desired time range.          Post Anesthesia Care and Evaluation    Patient location during evaluation: bedside  Patient participation: complete - patient participated  Level of consciousness: sleepy but conscious  Pain score: 0  Pain management: adequate  Airway patency: patent  Anesthetic complications: No anesthetic complications  PONV Status: none  Cardiovascular status: acceptable and hemodynamically stable  Respiratory status: acceptable, nonlabored ventilation and spontaneous ventilation  Hydration status: acceptable

## 2020-07-16 LAB
LAB AP CASE REPORT: NORMAL
PATH REPORT.FINAL DX SPEC: NORMAL

## 2020-07-21 ENCOUNTER — OFFICE VISIT (OUTPATIENT)
Dept: GASTROENTEROLOGY | Facility: CLINIC | Age: 75
End: 2020-07-21

## 2020-07-21 VITALS
HEART RATE: 83 BPM | DIASTOLIC BLOOD PRESSURE: 70 MMHG | HEIGHT: 67 IN | SYSTOLIC BLOOD PRESSURE: 118 MMHG | WEIGHT: 217.6 LBS | BODY MASS INDEX: 34.15 KG/M2

## 2020-07-21 DIAGNOSIS — K74.60 CIRRHOSIS OF LIVER WITH ASCITES, UNSPECIFIED HEPATIC CIRRHOSIS TYPE (HCC): Primary | ICD-10-CM

## 2020-07-21 DIAGNOSIS — R18.8 CIRRHOSIS OF LIVER WITH ASCITES, UNSPECIFIED HEPATIC CIRRHOSIS TYPE (HCC): Primary | ICD-10-CM

## 2020-07-21 DIAGNOSIS — K74.60 CIRRHOSIS OF LIVER WITHOUT ASCITES, UNSPECIFIED HEPATIC CIRRHOSIS TYPE (HCC): ICD-10-CM

## 2020-07-21 PROCEDURE — 99213 OFFICE O/P EST LOW 20 MIN: CPT | Performed by: INTERNAL MEDICINE

## 2020-08-19 NOTE — TELEPHONE ENCOUNTER
Patient called and says her back is hurting really bad and wants pain medication called in to pharmacy.  Called patient back and left message on Voicemail that she could come to walk in clinic to she Amari as Rose Marie is not in the office on Wednesday.   Pt wanded at this time by security.

## 2020-08-28 DIAGNOSIS — M54.6 THORACIC BACK PAIN, UNSPECIFIED BACK PAIN LATERALITY, UNSPECIFIED CHRONICITY: ICD-10-CM

## 2020-08-28 NOTE — TELEPHONE ENCOUNTER
PATIENT CALLING IN REQUESTING A REFILL OF TRAMADOL. STATES THAT SHE ONLY HAS 2 PILLS LEFT BUT HER HIP IS GIVING HER ISSUES.     UNABLE TO LOCATE DOSAGE IN CHART.     PATIENT WAS SCHEDULED FOR NEXT AVAILABLE BUT DID ADVISE THAT SHE CAN NOT WAIT THAT LONG AND WILL NEED MEDS PRIOR TO THAT APPOINTMENT DATE..      PLEASE CONTACT AND ADVISE

## 2020-08-31 RX ORDER — TRAMADOL HYDROCHLORIDE 50 MG/1
50 TABLET ORAL EVERY 8 HOURS PRN
Qty: 90 TABLET | Refills: 0 | Status: SHIPPED | OUTPATIENT
Start: 2020-08-31 | End: 2021-03-15 | Stop reason: SDUPTHER

## 2020-09-25 ENCOUNTER — OFFICE VISIT (OUTPATIENT)
Dept: FAMILY MEDICINE CLINIC | Facility: CLINIC | Age: 75
End: 2020-09-25

## 2020-09-25 VITALS
RESPIRATION RATE: 20 BRPM | HEIGHT: 67 IN | TEMPERATURE: 98.4 F | BODY MASS INDEX: 34.69 KG/M2 | OXYGEN SATURATION: 98 % | SYSTOLIC BLOOD PRESSURE: 133 MMHG | DIASTOLIC BLOOD PRESSURE: 70 MMHG | HEART RATE: 85 BPM | WEIGHT: 221 LBS

## 2020-09-25 DIAGNOSIS — Z12.39 SCREENING FOR BREAST CANCER: Primary | ICD-10-CM

## 2020-09-25 DIAGNOSIS — Z12.31 ENCOUNTER FOR SCREENING MAMMOGRAM FOR MALIGNANT NEOPLASM OF BREAST: ICD-10-CM

## 2020-09-25 DIAGNOSIS — E55.9 VITAMIN D DEFICIENCY: ICD-10-CM

## 2020-09-25 DIAGNOSIS — Z23 IMMUNIZATION DUE: ICD-10-CM

## 2020-09-25 PROCEDURE — 90694 VACC AIIV4 NO PRSRV 0.5ML IM: CPT | Performed by: NURSE PRACTITIONER

## 2020-09-25 PROCEDURE — G0008 ADMIN INFLUENZA VIRUS VAC: HCPCS | Performed by: NURSE PRACTITIONER

## 2020-09-25 PROCEDURE — 99214 OFFICE O/P EST MOD 30 MIN: CPT | Performed by: NURSE PRACTITIONER

## 2020-09-25 RX ORDER — ERGOCALCIFEROL 1.25 MG/1
50000 CAPSULE ORAL WEEKLY
Qty: 5 CAPSULE | Refills: 6 | Status: SHIPPED | OUTPATIENT
Start: 2020-09-25 | End: 2021-05-14 | Stop reason: SDUPTHER

## 2020-09-25 NOTE — PROGRESS NOTES
Subjective   Pita Hyatt is a 75 y.o. female.     Pita Gabriel comes in today stating she thinks she felt a small mass her left breast a few days ago.  However now she does not feel it any longer.  She thinks she is due for mammogram and would like to have this scheduled today.      Breast Mass  This is a new problem. The current episode started in the past 7 days. The problem occurs intermittently. The problem has been resolved (No longer feels the mass at this time). Pertinent negatives include no abdominal pain, anorexia, arthralgias, change in bowel habit, chest pain, chills, congestion, coughing, diaphoresis, fatigue, fever, headaches, joint swelling, myalgias, nausea, neck pain, numbness, rash, sore throat, swollen glands, urinary symptoms, vertigo, visual change, vomiting or weakness. Nothing aggravates the symptoms. She has tried nothing for the symptoms. The treatment provided no relief.        The following portions of the patient's history were reviewed and updated as appropriate: allergies, current medications, past family history, past medical history, past social history, past surgical history and problem list.    Review of Systems   Constitutional: Negative.  Negative for chills, diaphoresis, fatigue and fever.   HENT: Negative.  Negative for congestion, sore throat and swollen glands.    Eyes: Negative.    Respiratory: Negative.  Negative for cough.    Cardiovascular: Negative.  Negative for chest pain.   Gastrointestinal: Negative.  Negative for abdominal pain, anorexia, change in bowel habit, nausea and vomiting.   Endocrine: Negative.    Genitourinary: Negative.  Positive for breast lump.   Musculoskeletal: Negative.  Negative for arthralgias, joint swelling, myalgias and neck pain.   Skin: Negative.  Negative for rash.   Allergic/Immunologic: Negative.    Neurological: Negative.  Negative for vertigo, weakness and numbness.   Hematological: Negative.    Psychiatric/Behavioral: Negative.         Objective   Physical Exam  Vitals signs and nursing note reviewed.   Constitutional:       General: She is not in acute distress.     Appearance: She is well-developed.   HENT:      Head: Normocephalic and atraumatic.      Right Ear: External ear normal.      Left Ear: External ear normal.      Nose: Nose normal.      Mouth/Throat:      Pharynx: No oropharyngeal exudate.   Eyes:      Pupils: Pupils are equal, round, and reactive to light.   Neck:      Musculoskeletal: Normal range of motion and neck supple.      Thyroid: No thyromegaly.   Cardiovascular:      Rate and Rhythm: Normal rate and regular rhythm.      Heart sounds: Normal heart sounds. No murmur. No friction rub.   Pulmonary:      Effort: Pulmonary effort is normal. No respiratory distress.      Breath sounds: Normal breath sounds. No wheezing or rales.   Chest:      Chest wall: No mass.      Breasts:         Right: Normal. No mass.         Left: Normal. No mass.      Comments: Not able to palpate a mass in the left breast.  She thought that it was more in the tail of Garcia area.  However this area is completely and thoroughly examined and no masses are palpated.  Abdominal:      Palpations: Abdomen is soft.   Musculoskeletal: Normal range of motion.   Skin:     General: Skin is warm and dry.   Neurological:      Mental Status: She is alert and oriented to person, place, and time.   Psychiatric:         Thought Content: Thought content normal.           Assessment/Plan   [place] was seen today for lump left breast.    Diagnoses and all orders for this visit:    Screening for breast cancer  -     Mammo Screening Bilateral With CAD; Future    Vitamin D deficiency  -     vitamin D (ERGOCALCIFEROL) 1.25 MG (59241 UT) capsule capsule; Take 1 capsule by mouth 1 (One) Time Per Week.    Encounter for screening mammogram for malignant neoplasm of breast   Comments:  If the Radiology department picks up something on the mammogram we will get a spot  compression and/or ultrasound ordered at that time.  Orders:  -     Mammo Screening Bilateral With CAD; Future    Immunization due  -     Fluad Quad 65+ yrs ([id number])    BMI is noted to be 34.6 which is considered obese.  Diet and exercise information will be provided to patient.           Answers for HPI/ROS submitted by the patient on 9/18/2020   What is the primary reason for your visit?: Other  Please describe your symptoms.: Lump in breast  Have you had these symptoms before?: No  How long have you been having these symptoms?: 1-2 weeks

## 2020-09-25 NOTE — PATIENT INSTRUCTIONS
Calorie Counting for Weight Loss  Calories are units of energy. Your body needs a certain amount of calories from food to keep you going throughout the day. When you eat more calories than your body needs, your body stores the extra calories as fat. When you eat fewer calories than your body needs, your body burns fat to get the energy it needs.  Calorie counting means keeping track of how many calories you eat and drink each day. Calorie counting can be helpful if you need to lose weight. If you make sure to eat fewer calories than your body needs, you should lose weight. Ask your health care provider what a healthy weight is for you.  For calorie counting to work, you will need to eat the right number of calories in a day in order to lose a healthy amount of weight per week. A dietitian can help you determine how many calories you need in a day and will give you suggestions on how to reach your calorie goal.  · A healthy amount of weight to lose per week is usually 1-2 lb (0.5-0.9 kg). This usually means that your daily calorie intake should be reduced by 500-750 calories.  · Eating 1,200 - 1,500 calories per day can help most women lose weight.  · Eating 1,500 - 1,800 calories per day can help most men lose weight.  What is my plan?  My goal is to have __________ calories per day.  If I have this many calories per day, I should lose around __________ pounds per week.  What do I need to know about calorie counting?  In order to meet your daily calorie goal, you will need to:  · Find out how many calories are in each food you would like to eat. Try to do this before you eat.  · Decide how much of the food you plan to eat.  · Write down what you ate and how many calories it had. Doing this is called keeping a food log.  To successfully lose weight, it is important to balance calorie counting with a healthy lifestyle that includes regular activity. Aim for 150 minutes of moderate exercise (such as walking) or 75  minutes of vigorous exercise (such as running) each week.  Where do I find calorie information?    The number of calories in a food can be found on a Nutrition Facts label. If a food does not have a Nutrition Facts label, try to look up the calories online or ask your dietitian for help.  Remember that calories are listed per serving. If you choose to have more than one serving of a food, you will have to multiply the calories per serving by the amount of servings you plan to eat. For example, the label on a package of bread might say that a serving size is 1 slice and that there are 90 calories in a serving. If you eat 1 slice, you will have eaten 90 calories. If you eat 2 slices, you will have eaten 180 calories.  How do I keep a food log?  Immediately after each meal, record the following information in your food log:  · What you ate. Don't forget to include toppings, sauces, and other extras on the food.  · How much you ate. This can be measured in cups, ounces, or number of items.  · How many calories each food and drink had.  · The total number of calories in the meal.  Keep your food log near you, such as in a small notebook in your pocket, or use a mobile miguel or website. Some programs will calculate calories for you and show you how many calories you have left for the day to meet your goal.  What are some calorie counting tips?    · Use your calories on foods and drinks that will fill you up and not leave you hungry:  ? Some examples of foods that fill you up are nuts and nut butters, vegetables, lean proteins, and high-fiber foods like whole grains. High-fiber foods are foods with more than 5 g fiber per serving.  ? Drinks such as sodas, specialty coffee drinks, alcohol, and juices have a lot of calories, yet do not fill you up.  · Eat nutritious foods and avoid empty calories. Empty calories are calories you get from foods or beverages that do not have many vitamins or protein, such as candy, sweets, and  "soda. It is better to have a nutritious high-calorie food (such as an avocado) than a food with few nutrients (such as a bag of chips).  · Know how many calories are in the foods you eat most often. This will help you calculate calorie counts faster.  · Pay attention to calories in drinks. Low-calorie drinks include water and unsweetened drinks.  · Pay attention to nutrition labels for \"low fat\" or \"fat free\" foods. These foods sometimes have the same amount of calories or more calories than the full fat versions. They also often have added sugar, starch, or salt, to make up for flavor that was removed with the fat.  · Find a way of tracking calories that works for you. Get creative. Try different apps or programs if writing down calories does not work for you.  What are some portion control tips?  · Know how many calories are in a serving. This will help you know how many servings of a certain food you can have.  · Use a measuring cup to measure serving sizes. You could also try weighing out portions on a kitchen scale. With time, you will be able to estimate serving sizes for some foods.  · Take some time to put servings of different foods on your favorite plates, bowls, and cups so you know what a serving looks like.  · Try not to eat straight from a bag or box. Doing this can lead to overeating. Put the amount you would like to eat in a cup or on a plate to make sure you are eating the right portion.  · Use smaller plates, glasses, and bowls to prevent overeating.  · Try not to multitask (for example, watch TV or use your computer) while eating. If it is time to eat, sit down at a table and enjoy your food. This will help you to know when you are full. It will also help you to be aware of what you are eating and how much you are eating.  What are tips for following this plan?  Reading food labels  · Check the calorie count compared to the serving size. The serving size may be smaller than what you are used to " "eating.  · Check the source of the calories. Make sure the food you are eating is high in vitamins and protein and low in saturated and trans fats.  Shopping  · Read nutrition labels while you shop. This will help you make healthy decisions before you decide to purchase your food.  · Make a grocery list and stick to it.  Cooking  · Try to cook your favorite foods in a healthier way. For example, try baking instead of frying.  · Use low-fat dairy products.  Meal planning  · Use more fruits and vegetables. Half of your plate should be fruits and vegetables.  · Include lean proteins like poultry and fish.  How do I count calories when eating out?  · Ask for smaller portion sizes.  · Consider sharing an entree and sides instead of getting your own entree.  · If you get your own entree, eat only half. Ask for a box at the beginning of your meal and put the rest of your entree in it so you are not tempted to eat it.  · If calories are listed on the menu, choose the lower calorie options.  · Choose dishes that include vegetables, fruits, whole grains, low-fat dairy products, and lean protein.  · Choose items that are boiled, broiled, grilled, or steamed. Stay away from items that are buttered, battered, fried, or served with cream sauce. Items labeled \"crispy\" are usually fried, unless stated otherwise.  · Choose water, low-fat milk, unsweetened iced tea, or other drinks without added sugar. If you want an alcoholic beverage, choose a lower calorie option such as a glass of wine or light beer.  · Ask for dressings, sauces, and syrups on the side. These are usually high in calories, so you should limit the amount you eat.  · If you want a salad, choose a garden salad and ask for grilled meats. Avoid extra toppings like gregory, cheese, or fried items. Ask for the dressing on the side, or ask for olive oil and vinegar or lemon to use as dressing.  · Estimate how many servings of a food you are given. For example, a serving of " cooked rice is ½ cup or about the size of half a baseball. Knowing serving sizes will help you be aware of how much food you are eating at restaurants. The list below tells you how big or small some common portion sizes are based on everyday objects:  ? 1 oz--4 stacked dice.  ? 3 oz--1 deck of cards.  ? 1 tsp--1 die.  ? 1 Tbsp--½ a ping-pong ball.  ? 2 Tbsp--1 ping-pong ball.  ? ½ cup--½ baseball.  ? 1 cup--1 baseball.  Summary  · Calorie counting means keeping track of how many calories you eat and drink each day. If you eat fewer calories than your body needs, you should lose weight.  · A healthy amount of weight to lose per week is usually 1-2 lb (0.5-0.9 kg). This usually means reducing your daily calorie intake by 500-750 calories.  · The number of calories in a food can be found on a Nutrition Facts label. If a food does not have a Nutrition Facts label, try to look up the calories online or ask your dietitian for help.  · Use your calories on foods and drinks that will fill you up, and not on foods and drinks that will leave you hungry.  · Use smaller plates, glasses, and bowls to prevent overeating.  This information is not intended to replace advice given to you by your health care provider. Make sure you discuss any questions you have with your health care provider.  Document Released: 12/18/2006 Document Revised: 09/06/2019 Document Reviewed: 11/17/2017  [employer] Patient Education © 2020 [employer]      Exercising to Lose Weight  Exercise is structured, repetitive physical activity to improve fitness and health. Getting regular exercise is important for everyone. It is especially important if you are overweight. Being overweight increases your risk of heart disease, stroke, diabetes, high blood pressure, and several types of cancer. Reducing your calorie intake and exercising can help you lose weight.  Exercise is usually categorized as moderate or vigorous intensity. To lose weight, most people need  to do a certain amount of moderate-intensity or vigorous-intensity exercise each week.  Moderate-intensity exercise    Moderate-intensity exercise is any activity that gets you moving enough to burn at least three times more energy (calories) than if you were sitting.  Examples of moderate exercise include:  · Walking a mile in 15 minutes.  · Doing light yard work.  · Biking at an easy pace.  Most people should get at least 150 minutes (2 hours and 30 minutes) a week of moderate-intensity exercise to maintain their body weight.  Vigorous-intensity exercise  Vigorous-intensity exercise is any activity that gets you moving enough to burn at least six times more calories than if you were sitting. When you exercise at this intensity, you should be working hard enough that you are not able to carry on a conversation.  Examples of vigorous exercise include:  · Running.  · Playing a team sport, such as football, basketball, and soccer.  · Jumping rope.  Most people should get at least 75 minutes (1 hour and 15 minutes) a week of vigorous-intensity exercise to maintain their body weight.  How can exercise affect me?  When you exercise enough to burn more calories than you eat, you lose weight. Exercise also reduces body fat and builds muscle. The more muscle you have, the more calories you burn. Exercise also:  · Improves mood.  · Reduces stress and tension.  · Improves your overall fitness, flexibility, and endurance.  · Increases bone strength.  The amount of exercise you need to lose weight depends on:  · Your age.  · The type of exercise.  · Any health conditions you have.  · Your overall physical ability.  Talk to your health care provider about how much exercise you need and what types of activities are safe for you.  What actions can I take to lose weight?  Nutrition    · Make changes to your diet as told by your health care provider or diet and nutrition specialist (dietitian). This may include:  ? Eating fewer  calories.  ? Eating more protein.  ? Eating less unhealthy fats.  ? Eating a diet that includes fresh fruits and vegetables, whole grains, low-fat dairy products, and lean protein.  ? Avoiding foods with added fat, salt, and sugar.  · Drink plenty of water while you exercise to prevent dehydration or heat stroke.  Activity  · Choose an activity that you enjoy and set realistic goals. Your health care provider can help you make an exercise plan that works for you.  · Exercise at a moderate or vigorous intensity most days of the week.  ? The intensity of exercise may vary from person to person. You can tell how intense a workout is for you by paying attention to your breathing and heartbeat. Most people will notice their breathing and heartbeat get faster with more intense exercise.  · Do resistance training twice each week, such as:  ? Push-ups.  ? Sit-ups.  ? Lifting weights.  ? Using resistance bands.  · Getting short amounts of exercise can be just as helpful as long structured periods of exercise. If you have trouble finding time to exercise, try to include exercise in your daily routine.  ? Get up, stretch, and walk around every 30 minutes throughout the day.  ? Go for a walk during your lunch break.  ? Park your car farther away from your destination.  ? If you take public transportation, get off one stop early and walk the rest of the way.  ? Make phone calls while standing up and walking around.  ? Take the stairs instead of elevators or escalators.  · Wear comfortable clothes and shoes with good support.  · Do not exercise so much that you hurt yourself, feel dizzy, or get very short of breath.  Where to find more information  · [place] Department of Health and Human Services: www.hhs.gov  · Centers for Disease Control and Prevention (CDC): www.cdc.gov  Contact a health care provider:  · Before starting a new exercise program.  · If you have questions or concerns about your weight.  · If you have a medical  problem that keeps you from exercising.  Get help right away if you have any of the following while exercising:  · Injury.  · Dizziness.  · Difficulty breathing or shortness of breath that does not go away when you stop exercising.  · Chest pain.  · Rapid heartbeat.  Summary  · Being overweight increases your risk of heart disease, stroke, diabetes, high blood pressure, and several types of cancer.  · Losing weight happens when you burn more calories than you eat.  · Reducing the amount of calories you eat in addition to getting regular moderate or vigorous exercise each week helps you lose weight.  This information is not intended to replace advice given to you by your health care provider. Make sure you discuss any questions you have with your health care provider.  Document Released: 01/20/2012 Document Revised: 12/31/2018 Document Reviewed: 12/31/2018  [employer] Patient Education © 2020 [employer]

## 2020-10-14 ENCOUNTER — TELEPHONE (OUTPATIENT)
Dept: FAMILY MEDICINE CLINIC | Facility: CLINIC | Age: 75
End: 2020-10-14

## 2020-10-25 DIAGNOSIS — E03.9 HYPOTHYROIDISM, UNSPECIFIED TYPE: ICD-10-CM

## 2020-10-27 RX ORDER — LEVOTHYROXINE SODIUM 0.1 MG/1
TABLET ORAL
Qty: 90 TABLET | Refills: 0 | Status: SHIPPED | OUTPATIENT
Start: 2020-10-27 | End: 2021-02-02

## 2020-12-15 ENCOUNTER — TELEPHONE (OUTPATIENT)
Dept: FAMILY MEDICINE CLINIC | Facility: CLINIC | Age: 75
End: 2020-12-15

## 2020-12-15 ENCOUNTER — OFFICE VISIT (OUTPATIENT)
Dept: FAMILY MEDICINE CLINIC | Facility: CLINIC | Age: 75
End: 2020-12-15

## 2020-12-15 ENCOUNTER — LAB (OUTPATIENT)
Dept: LAB | Facility: HOSPITAL | Age: 75
End: 2020-12-15

## 2020-12-15 VITALS
HEART RATE: 78 BPM | BODY MASS INDEX: 35 KG/M2 | DIASTOLIC BLOOD PRESSURE: 78 MMHG | RESPIRATION RATE: 21 BRPM | OXYGEN SATURATION: 99 % | HEIGHT: 67 IN | WEIGHT: 223 LBS | SYSTOLIC BLOOD PRESSURE: 136 MMHG | TEMPERATURE: 97.9 F

## 2020-12-15 DIAGNOSIS — Z13.220 SCREENING FOR LIPID DISORDERS: ICD-10-CM

## 2020-12-15 DIAGNOSIS — Z13.1 SCREENING FOR DIABETES MELLITUS: ICD-10-CM

## 2020-12-15 DIAGNOSIS — I83.93 VARICOSE VEINS OF BOTH LOWER EXTREMITIES, UNSPECIFIED WHETHER COMPLICATED: ICD-10-CM

## 2020-12-15 DIAGNOSIS — R60.0 LOCALIZED EDEMA: Primary | ICD-10-CM

## 2020-12-15 DIAGNOSIS — K59.00 CONSTIPATION, UNSPECIFIED CONSTIPATION TYPE: ICD-10-CM

## 2020-12-15 PROCEDURE — 99214 OFFICE O/P EST MOD 30 MIN: CPT | Performed by: NURSE PRACTITIONER

## 2020-12-15 NOTE — TELEPHONE ENCOUNTER
PATIENT CALLED STATING [facility] CANT GET BLOOD WORK WANTING TO KNOW WHERE ELSE SHE CAN GO?   CALL BACK: [phone]

## 2020-12-28 NOTE — PATIENT INSTRUCTIONS
Calorie Counting for Weight Loss  Calories are units of energy. Your body needs a certain amount of calories from food to keep you going throughout the day. When you eat more calories than your body needs, your body stores the extra calories as fat. When you eat fewer calories than your body needs, your body burns fat to get the energy it needs.  Calorie counting means keeping track of how many calories you eat and drink each day. Calorie counting can be helpful if you need to lose weight. If you make sure to eat fewer calories than your body needs, you should lose weight. Ask your health care provider what a healthy weight is for you.  For calorie counting to work, you will need to eat the right number of calories in a day in order to lose a healthy amount of weight per week. A dietitian can help you determine how many calories you need in a day and will give you suggestions on how to reach your calorie goal.  · A healthy amount of weight to lose per week is usually 1-2 lb (0.5-0.9 kg). This usually means that your daily calorie intake should be reduced by 500-750 calories.  · Eating 1,200 - 1,500 calories per day can help most women lose weight.  · Eating 1,500 - 1,800 calories per day can help most men lose weight.  What is my plan?  My goal is to have __________ calories per day.  If I have this many calories per day, I should lose around __________ pounds per week.  What do I need to know about calorie counting?  In order to meet your daily calorie goal, you will need to:  · Find out how many calories are in each food you would like to eat. Try to do this before you eat.  · Decide how much of the food you plan to eat.  · Write down what you ate and how many calories it had. Doing this is called keeping a food log.  To successfully lose weight, it is important to balance calorie counting with a healthy lifestyle that includes regular activity. Aim for 150 minutes of moderate exercise (such as walking) or 75  minutes of vigorous exercise (such as running) each week.  Where do I find calorie information?    The number of calories in a food can be found on a Nutrition Facts label. If a food does not have a Nutrition Facts label, try to look up the calories online or ask your dietitian for help.  Remember that calories are listed per serving. If you choose to have more than one serving of a food, you will have to multiply the calories per serving by the amount of servings you plan to eat. For example, the label on a package of bread might say that a serving size is 1 slice and that there are 90 calories in a serving. If you eat 1 slice, you will have eaten 90 calories. If you eat 2 slices, you will have eaten 180 calories.  How do I keep a food log?  Immediately after each meal, record the following information in your food log:  · What you ate. Don't forget to include toppings, sauces, and other extras on the food.  · How much you ate. This can be measured in cups, ounces, or number of items.  · How many calories each food and drink had.  · The total number of calories in the meal.  Keep your food log near you, such as in a small notebook in your pocket, or use a mobile miguel or website. Some programs will calculate calories for you and show you how many calories you have left for the day to meet your goal.  What are some calorie counting tips?    · Use your calories on foods and drinks that will fill you up and not leave you hungry:  ? Some examples of foods that fill you up are nuts and nut butters, vegetables, lean proteins, and high-fiber foods like whole grains. High-fiber foods are foods with more than 5 g fiber per serving.  ? Drinks such as sodas, specialty coffee drinks, alcohol, and juices have a lot of calories, yet do not fill you up.  · Eat nutritious foods and avoid empty calories. Empty calories are calories you get from foods or beverages that do not have many vitamins or protein, such as candy, sweets, and  "soda. It is better to have a nutritious high-calorie food (such as an avocado) than a food with few nutrients (such as a bag of chips).  · Know how many calories are in the foods you eat most often. This will help you calculate calorie counts faster.  · Pay attention to calories in drinks. Low-calorie drinks include water and unsweetened drinks.  · Pay attention to nutrition labels for \"low fat\" or \"fat free\" foods. These foods sometimes have the same amount of calories or more calories than the full fat versions. They also often have added sugar, starch, or salt, to make up for flavor that was removed with the fat.  · Find a way of tracking calories that works for you. Get creative. Try different apps or programs if writing down calories does not work for you.  What are some portion control tips?  · Know how many calories are in a serving. This will help you know how many servings of a certain food you can have.  · Use a measuring cup to measure serving sizes. You could also try weighing out portions on a kitchen scale. With time, you will be able to estimate serving sizes for some foods.  · Take some time to put servings of different foods on your favorite plates, bowls, and cups so you know what a serving looks like.  · Try not to eat straight from a bag or box. Doing this can lead to overeating. Put the amount you would like to eat in a cup or on a plate to make sure you are eating the right portion.  · Use smaller plates, glasses, and bowls to prevent overeating.  · Try not to multitask (for example, watch TV or use your computer) while eating. If it is time to eat, sit down at a table and enjoy your food. This will help you to know when you are full. It will also help you to be aware of what you are eating and how much you are eating.  What are tips for following this plan?  Reading food labels  · Check the calorie count compared to the serving size. The serving size may be smaller than what you are used to " "eating.  · Check the source of the calories. Make sure the food you are eating is high in vitamins and protein and low in saturated and trans fats.  Shopping  · Read nutrition labels while you shop. This will help you make healthy decisions before you decide to purchase your food.  · Make a grocery list and stick to it.  Cooking  · Try to cook your favorite foods in a healthier way. For example, try baking instead of frying.  · Use low-fat dairy products.  Meal planning  · Use more fruits and vegetables. Half of your plate should be fruits and vegetables.  · Include lean proteins like poultry and fish.  How do I count calories when eating out?  · Ask for smaller portion sizes.  · Consider sharing an entree and sides instead of getting your own entree.  · If you get your own entree, eat only half. Ask for a box at the beginning of your meal and put the rest of your entree in it so you are not tempted to eat it.  · If calories are listed on the menu, choose the lower calorie options.  · Choose dishes that include vegetables, fruits, whole grains, low-fat dairy products, and lean protein.  · Choose items that are boiled, broiled, grilled, or steamed. Stay away from items that are buttered, battered, fried, or served with cream sauce. Items labeled \"crispy\" are usually fried, unless stated otherwise.  · Choose water, low-fat milk, unsweetened iced tea, or other drinks without added sugar. If you want an alcoholic beverage, choose a lower calorie option such as a glass of wine or light beer.  · Ask for dressings, sauces, and syrups on the side. These are usually high in calories, so you should limit the amount you eat.  · If you want a salad, choose a garden salad and ask for grilled meats. Avoid extra toppings like gregory, cheese, or fried items. Ask for the dressing on the side, or ask for olive oil and vinegar or lemon to use as dressing.  · Estimate how many servings of a food you are given. For example, a serving of " cooked rice is ½ cup or about the size of half a baseball. Knowing serving sizes will help you be aware of how much food you are eating at restaurants. The list below tells you how big or small some common portion sizes are based on everyday objects:  ? 1 oz--4 stacked dice.  ? 3 oz--1 deck of cards.  ? 1 tsp--1 die.  ? 1 Tbsp--½ a ping-pong ball.  ? 2 Tbsp--1 ping-pong ball.  ? ½ cup--½ baseball.  ? 1 cup--1 baseball.  Summary  · Calorie counting means keeping track of how many calories you eat and drink each day. If you eat fewer calories than your body needs, you should lose weight.  · A healthy amount of weight to lose per week is usually 1-2 lb (0.5-0.9 kg). This usually means reducing your daily calorie intake by 500-750 calories.  · The number of calories in a food can be found on a Nutrition Facts label. If a food does not have a Nutrition Facts label, try to look up the calories online or ask your dietitian for help.  · Use your calories on foods and drinks that will fill you up, and not on foods and drinks that will leave you hungry.  · Use smaller plates, glasses, and bowls to prevent overeating.  This information is not intended to replace advice given to you by your health care provider. Make sure you discuss any questions you have with your health care provider.  Document Revised: 09/06/2019 Document Reviewed: 11/17/2017  [employer] Patient Education © 2020 [employer]      Exercising to Lose Weight  Exercise is structured, repetitive physical activity to improve fitness and health. Getting regular exercise is important for everyone. It is especially important if you are overweight. Being overweight increases your risk of heart disease, stroke, diabetes, high blood pressure, and several types of cancer. Reducing your calorie intake and exercising can help you lose weight.  Exercise is usually categorized as moderate or vigorous intensity. To lose weight, most people need to do a certain amount of  moderate-intensity or vigorous-intensity exercise each week.  Moderate-intensity exercise    Moderate-intensity exercise is any activity that gets you moving enough to burn at least three times more energy (calories) than if you were sitting.  Examples of moderate exercise include:  · Walking a mile in 15 minutes.  · Doing light yard work.  · Biking at an easy pace.  Most people should get at least 150 minutes (2 hours and 30 minutes) a week of moderate-intensity exercise to maintain their body weight.  Vigorous-intensity exercise  Vigorous-intensity exercise is any activity that gets you moving enough to burn at least six times more calories than if you were sitting. When you exercise at this intensity, you should be working hard enough that you are not able to carry on a conversation.  Examples of vigorous exercise include:  · Running.  · Playing a team sport, such as football, basketball, and soccer.  · Jumping rope.  Most people should get at least 75 minutes (1 hour and 15 minutes) a week of vigorous-intensity exercise to maintain their body weight.  How can exercise affect me?  When you exercise enough to burn more calories than you eat, you lose weight. Exercise also reduces body fat and builds muscle. The more muscle you have, the more calories you burn. Exercise also:  · Improves mood.  · Reduces stress and tension.  · Improves your overall fitness, flexibility, and endurance.  · Increases bone strength.  The amount of exercise you need to lose weight depends on:  · Your age.  · The type of exercise.  · Any health conditions you have.  · Your overall physical ability.  Talk to your health care provider about how much exercise you need and what types of activities are safe for you.  What actions can I take to lose weight?  Nutrition    · Make changes to your diet as told by your health care provider or diet and nutrition specialist (dietitian). This may include:  ? Eating fewer calories.  ? Eating more  protein.  ? Eating less unhealthy fats.  ? Eating a diet that includes fresh fruits and vegetables, whole grains, low-fat dairy products, and lean protein.  ? Avoiding foods with added fat, salt, and sugar.  · Drink plenty of water while you exercise to prevent dehydration or heat stroke.  Activity  · Choose an activity that you enjoy and set realistic goals. Your health care provider can help you make an exercise plan that works for you.  · Exercise at a moderate or vigorous intensity most days of the week.  ? The intensity of exercise may vary from person to person. You can tell how intense a workout is for you by paying attention to your breathing and heartbeat. Most people will notice their breathing and heartbeat get faster with more intense exercise.  · Do resistance training twice each week, such as:  ? Push-ups.  ? Sit-ups.  ? Lifting weights.  ? Using resistance bands.  · Getting short amounts of exercise can be just as helpful as long structured periods of exercise. If you have trouble finding time to exercise, try to include exercise in your daily routine.  ? Get up, stretch, and walk around every 30 minutes throughout the day.  ? Go for a walk during your lunch break.  ? Park your car farther away from your destination.  ? If you take public transportation, get off one stop early and walk the rest of the way.  ? Make phone calls while standing up and walking around.  ? Take the stairs instead of elevators or escalators.  · Wear comfortable clothes and shoes with good support.  · Do not exercise so much that you hurt yourself, feel dizzy, or get very short of breath.  Where to find more information  · [place] Department of Health and Human Services: www.hhs.gov  · Centers for Disease Control and Prevention (CDC): www.cdc.gov  Contact a health care provider:  · Before starting a new exercise program.  · If you have questions or concerns about your weight.  · If you have a medical problem that keeps you from  exercising.  Get help right away if you have any of the following while exercising:  · Injury.  · Dizziness.  · Difficulty breathing or shortness of breath that does not go away when you stop exercising.  · Chest pain.  · Rapid heartbeat.  Summary  · Being overweight increases your risk of heart disease, stroke, diabetes, high blood pressure, and several types of cancer.  · Losing weight happens when you burn more calories than you eat.  · Reducing the amount of calories you eat in addition to getting regular moderate or vigorous exercise each week helps you lose weight.  This information is not intended to replace advice given to you by your health care provider. Make sure you discuss any questions you have with your health care provider.  Document Revised: 12/31/2018 Document Reviewed: 12/31/2018  [employer] Patient Education © 2020 [employer]

## 2020-12-28 NOTE — PROGRESS NOTES
Subjective   Pita Hyatt is a 75 y.o. female.     Pita Hyatt age 75 comes in today saying that she is having more swelling in her lower extremities than previously.  Has been going on for about a month now.  She states that she has no shortness of breath or chest pain.  Her other problem is that she has constipation, she has tried increasing her fiber and has taken some over-the-counter laxatives that have not been beneficial.  She does have MiraLAX at home but she has not taken it so far.    Leg Swelling  This is a recurrent problem. The current episode started more than 1 month ago. The problem occurs constantly. The problem has been gradually worsening. Associated symptoms include arthralgias. Pertinent negatives include no abdominal pain, anorexia, change in bowel habit, chest pain, chills, congestion, coughing, diaphoresis, fatigue, fever, headaches, joint swelling, myalgias, nausea, neck pain, numbness, rash, sore throat, swollen glands, urinary symptoms, vertigo, visual change, vomiting or weakness. The symptoms are aggravated by walking and standing. She has tried nothing for the symptoms. The treatment provided no relief.   Constipation  This is a recurrent problem. The current episode started more than 1 month ago. The problem has been waxing and waning since onset. Her stool frequency is 2 to 3 times per week. The stool is described as firm and formed. The patient is not on a high fiber diet. She does not exercise regularly. There has not been adequate water intake. Pertinent negatives include no abdominal pain, anorexia, back pain, bloating, diarrhea, difficulty urinating, fecal incontinence, fever, flatus, hematochezia, hemorrhoids, melena, nausea, rectal pain, vomiting or weight loss. Risk factors include obesity. She has tried laxatives and fiber for the symptoms. The treatment provided mild relief. There is no history of abdominal surgery, endocrine disease, inflammatory bowel disease,  irritable bowel syndrome, metabolic disease, neurologic disease, neuromuscular disease, psychiatric history or radiation treatment.        The following portions of the patient's history were reviewed and updated as appropriate: allergies, current medications, past family history, past medical history, past social history, past surgical history and problem list.    Review of Systems   Constitutional: Negative.  Negative for chills, diaphoresis, fatigue, fever and unexpected weight loss.   HENT: Negative.  Negative for congestion, sore throat and swollen glands.    Eyes: Negative.    Respiratory: Negative.  Negative for cough.    Cardiovascular: Negative.  Negative for chest pain.   Gastrointestinal: Positive for constipation. Negative for abdominal pain, anorexia, bloating, change in bowel habit, diarrhea, flatus, hematochezia, hemorrhoids, melena, nausea, rectal pain and vomiting.   Endocrine: Negative.    Genitourinary: Negative.  Negative for difficulty urinating.   Musculoskeletal: Positive for arthralgias. Negative for back pain, joint swelling, myalgias and neck pain.   Skin: Negative.  Negative for rash.   Allergic/Immunologic: Negative.    Neurological: Negative.  Negative for vertigo, weakness and numbness.   Hematological: Negative.    Psychiatric/Behavioral: Negative.        Objective   Physical Exam  Vitals signs and nursing note reviewed.   Constitutional:       General: She is not in acute distress.     Appearance: She is well-developed.   HENT:      Head: Normocephalic and atraumatic.      Right Ear: External ear normal.      Left Ear: External ear normal.      Nose: Nose normal.      Mouth/Throat:      Pharynx: No oropharyngeal exudate.   Eyes:      Pupils: Pupils are equal, round, and reactive to light.   Neck:      Musculoskeletal: Normal range of motion and neck supple.      Thyroid: No thyromegaly.   Cardiovascular:      Rate and Rhythm: Normal rate and regular rhythm.      Heart sounds: Normal  heart sounds. No murmur. No friction rub.      Comments: About 1+ edema in her lower extremities plus many varicose veins and spider veins in her feet ankles and upper legs.  Pulmonary:      Effort: Pulmonary effort is normal. No respiratory distress.      Breath sounds: Normal breath sounds. No wheezing or rales.   Abdominal:      Palpations: Abdomen is soft.   Musculoskeletal: Normal range of motion.   Skin:     General: Skin is warm and dry.   Neurological:      Mental Status: She is alert and oriented to person, place, and time.   Psychiatric:         Thought Content: Thought content normal.           Assessment/Plan   Diagnoses and all orders for this visit:    1. Localized edema (Primary)  -     Cancel: Comprehensive metabolic panel    2. Constipation, unspecified constipation type  Comments:  We will take the MiraLAX that she has at home to see if it helps if not then will try something else like Linzess.    3. Varicose veins of both lower extremities, unspecified whether complicated      My is noted to be 34.9 which is considered obese.  Diet and exercise information will be provided the patient.

## 2021-02-02 DIAGNOSIS — E03.9 HYPOTHYROIDISM, UNSPECIFIED TYPE: ICD-10-CM

## 2021-02-02 RX ORDER — LEVOTHYROXINE SODIUM 0.1 MG/1
TABLET ORAL
Qty: 90 TABLET | Refills: 0 | Status: SHIPPED | OUTPATIENT
Start: 2021-02-02 | End: 2021-05-14 | Stop reason: SDUPTHER

## 2021-03-15 ENCOUNTER — OFFICE VISIT (OUTPATIENT)
Dept: FAMILY MEDICINE CLINIC | Facility: CLINIC | Age: 76
End: 2021-03-15

## 2021-03-15 VITALS
HEIGHT: 67 IN | BODY MASS INDEX: 36.02 KG/M2 | OXYGEN SATURATION: 98 % | HEART RATE: 82 BPM | SYSTOLIC BLOOD PRESSURE: 140 MMHG | DIASTOLIC BLOOD PRESSURE: 80 MMHG | WEIGHT: 229.5 LBS

## 2021-03-15 DIAGNOSIS — M54.6 THORACIC BACK PAIN, UNSPECIFIED BACK PAIN LATERALITY, UNSPECIFIED CHRONICITY: Primary | ICD-10-CM

## 2021-03-15 DIAGNOSIS — R60.9 EDEMA, UNSPECIFIED TYPE: Primary | ICD-10-CM

## 2021-03-15 DIAGNOSIS — M54.6 THORACIC BACK PAIN, UNSPECIFIED BACK PAIN LATERALITY, UNSPECIFIED CHRONICITY: ICD-10-CM

## 2021-03-15 PROCEDURE — 99214 OFFICE O/P EST MOD 30 MIN: CPT | Performed by: NURSE PRACTITIONER

## 2021-03-15 PROCEDURE — 96372 THER/PROPH/DIAG INJ SC/IM: CPT | Performed by: NURSE PRACTITIONER

## 2021-03-15 RX ORDER — FUROSEMIDE 20 MG/1
20 TABLET ORAL DAILY
Qty: 30 TABLET | Refills: 3 | Status: SHIPPED | OUTPATIENT
Start: 2021-03-15 | End: 2021-05-14 | Stop reason: SDUPTHER

## 2021-03-15 RX ORDER — TRIAMCINOLONE ACETONIDE 40 MG/ML
40 INJECTION, SUSPENSION INTRA-ARTICULAR; INTRAMUSCULAR ONCE
Status: COMPLETED | OUTPATIENT
Start: 2021-03-15 | End: 2021-03-15

## 2021-03-15 RX ORDER — TRAMADOL HYDROCHLORIDE 50 MG/1
50 TABLET ORAL EVERY 8 HOURS PRN
Qty: 90 TABLET | Refills: 0 | Status: SHIPPED | OUTPATIENT
Start: 2021-03-15 | End: 2021-12-09

## 2021-03-15 RX ADMIN — TRIAMCINOLONE ACETONIDE 40 MG: 40 INJECTION, SUSPENSION INTRA-ARTICULAR; INTRAMUSCULAR at 15:32

## 2021-03-26 NOTE — PATIENT INSTRUCTIONS
Calorie Counting for Weight Loss  Calories are units of energy. Your body needs a certain amount of calories from food to keep you going throughout the day. When you eat more calories than your body needs, your body stores the extra calories as fat. When you eat fewer calories than your body needs, your body burns fat to get the energy it needs.  Calorie counting means keeping track of how many calories you eat and drink each day. Calorie counting can be helpful if you need to lose weight. If you make sure to eat fewer calories than your body needs, you should lose weight. Ask your health care provider what a healthy weight is for you.  For calorie counting to work, you will need to eat the right number of calories in a day in order to lose a healthy amount of weight per week. A dietitian can help you determine how many calories you need in a day and will give you suggestions on how to reach your calorie goal.  · A healthy amount of weight to lose per week is usually 1-2 lb (0.5-0.9 kg). This usually means that your daily calorie intake should be reduced by 500-750 calories.  · Eating 1,200 - 1,500 calories per day can help most women lose weight.  · Eating 1,500 - 1,800 calories per day can help most men lose weight.  What is my plan?  My goal is to have __________ calories per day.  If I have this many calories per day, I should lose around __________ pounds per week.  What do I need to know about calorie counting?  In order to meet your daily calorie goal, you will need to:  · Find out how many calories are in each food you would like to eat. Try to do this before you eat.  · Decide how much of the food you plan to eat.  · Write down what you ate and how many calories it had. Doing this is called keeping a food log.  To successfully lose weight, it is important to balance calorie counting with a healthy lifestyle that includes regular activity. Aim for 150 minutes of moderate exercise (such as walking) or 75  minutes of vigorous exercise (such as running) each week.  Where do I find calorie information?    The number of calories in a food can be found on a Nutrition Facts label. If a food does not have a Nutrition Facts label, try to look up the calories online or ask your dietitian for help.  Remember that calories are listed per serving. If you choose to have more than one serving of a food, you will have to multiply the calories per serving by the amount of servings you plan to eat. For example, the label on a package of bread might say that a serving size is 1 slice and that there are 90 calories in a serving. If you eat 1 slice, you will have eaten 90 calories. If you eat 2 slices, you will have eaten 180 calories.  How do I keep a food log?  Immediately after each meal, record the following information in your food log:  · What you ate. Don't forget to include toppings, sauces, and other extras on the food.  · How much you ate. This can be measured in cups, ounces, or number of items.  · How many calories each food and drink had.  · The total number of calories in the meal.  Keep your food log near you, such as in a small notebook in your pocket, or use a mobile miguel or website. Some programs will calculate calories for you and show you how many calories you have left for the day to meet your goal.  What are some calorie counting tips?    · Use your calories on foods and drinks that will fill you up and not leave you hungry:  ? Some examples of foods that fill you up are nuts and nut butters, vegetables, lean proteins, and high-fiber foods like whole grains. High-fiber foods are foods with more than 5 g fiber per serving.  ? Drinks such as sodas, specialty coffee drinks, alcohol, and juices have a lot of calories, yet do not fill you up.  · Eat nutritious foods and avoid empty calories. Empty calories are calories you get from foods or beverages that do not have many vitamins or protein, such as candy, sweets, and  "soda. It is better to have a nutritious high-calorie food (such as an avocado) than a food with few nutrients (such as a bag of chips).  · Know how many calories are in the foods you eat most often. This will help you calculate calorie counts faster.  · Pay attention to calories in drinks. Low-calorie drinks include water and unsweetened drinks.  · Pay attention to nutrition labels for \"low fat\" or \"fat free\" foods. These foods sometimes have the same amount of calories or more calories than the full fat versions. They also often have added sugar, starch, or salt, to make up for flavor that was removed with the fat.  · Find a way of tracking calories that works for you. Get creative. Try different apps or programs if writing down calories does not work for you.  What are some portion control tips?  · Know how many calories are in a serving. This will help you know how many servings of a certain food you can have.  · Use a measuring cup to measure serving sizes. You could also try weighing out portions on a kitchen scale. With time, you will be able to estimate serving sizes for some foods.  · Take some time to put servings of different foods on your favorite plates, bowls, and cups so you know what a serving looks like.  · Try not to eat straight from a bag or box. Doing this can lead to overeating. Put the amount you would like to eat in a cup or on a plate to make sure you are eating the right portion.  · Use smaller plates, glasses, and bowls to prevent overeating.  · Try not to multitask (for example, watch TV or use your computer) while eating. If it is time to eat, sit down at a table and enjoy your food. This will help you to know when you are full. It will also help you to be aware of what you are eating and how much you are eating.  What are tips for following this plan?  Reading food labels  · Check the calorie count compared to the serving size. The serving size may be smaller than what you are used to " "eating.  · Check the source of the calories. Make sure the food you are eating is high in vitamins and protein and low in saturated and trans fats.  Shopping  · Read nutrition labels while you shop. This will help you make healthy decisions before you decide to purchase your food.  · Make a grocery list and stick to it.  Cooking  · Try to cook your favorite foods in a healthier way. For example, try baking instead of frying.  · Use low-fat dairy products.  Meal planning  · Use more fruits and vegetables. Half of your plate should be fruits and vegetables.  · Include lean proteins like poultry and fish.  How do I count calories when eating out?  · Ask for smaller portion sizes.  · Consider sharing an entree and sides instead of getting your own entree.  · If you get your own entree, eat only half. Ask for a box at the beginning of your meal and put the rest of your entree in it so you are not tempted to eat it.  · If calories are listed on the menu, choose the lower calorie options.  · Choose dishes that include vegetables, fruits, whole grains, low-fat dairy products, and lean protein.  · Choose items that are boiled, broiled, grilled, or steamed. Stay away from items that are buttered, battered, fried, or served with cream sauce. Items labeled \"crispy\" are usually fried, unless stated otherwise.  · Choose water, low-fat milk, unsweetened iced tea, or other drinks without added sugar. If you want an alcoholic beverage, choose a lower calorie option such as a glass of wine or light beer.  · Ask for dressings, sauces, and syrups on the side. These are usually high in calories, so you should limit the amount you eat.  · If you want a salad, choose a garden salad and ask for grilled meats. Avoid extra toppings like gregory, cheese, or fried items. Ask for the dressing on the side, or ask for olive oil and vinegar or lemon to use as dressing.  · Estimate how many servings of a food you are given. For example, a serving of " cooked rice is ½ cup or about the size of half a baseball. Knowing serving sizes will help you be aware of how much food you are eating at restaurants. The list below tells you how big or small some common portion sizes are based on everyday objects:  ? 1 oz--4 stacked dice.  ? 3 oz--1 deck of cards.  ? 1 tsp--1 die.  ? 1 Tbsp--½ a ping-pong ball.  ? 2 Tbsp--1 ping-pong ball.  ? ½ cup--½ baseball.  ? 1 cup--1 baseball.  Summary  · Calorie counting means keeping track of how many calories you eat and drink each day. If you eat fewer calories than your body needs, you should lose weight.  · A healthy amount of weight to lose per week is usually 1-2 lb (0.5-0.9 kg). This usually means reducing your daily calorie intake by 500-750 calories.  · The number of calories in a food can be found on a Nutrition Facts label. If a food does not have a Nutrition Facts label, try to look up the calories online or ask your dietitian for help.  · Use your calories on foods and drinks that will fill you up, and not on foods and drinks that will leave you hungry.  · Use smaller plates, glasses, and bowls to prevent overeating.  This information is not intended to replace advice given to you by your health care provider. Make sure you discuss any questions you have with your health care provider.  Document Revised: 09/06/2019 Document Reviewed: 11/17/2017  [employer] Patient Education © 2020 [employer]      Exercising to Lose Weight  Exercise is structured, repetitive physical activity to improve fitness and health. Getting regular exercise is important for everyone. It is especially important if you are overweight. Being overweight increases your risk of heart disease, stroke, diabetes, high blood pressure, and several types of cancer. Reducing your calorie intake and exercising can help you lose weight.  Exercise is usually categorized as moderate or vigorous intensity. To lose weight, most people need to do a certain amount of  moderate-intensity or vigorous-intensity exercise each week.  Moderate-intensity exercise    Moderate-intensity exercise is any activity that gets you moving enough to burn at least three times more energy (calories) than if you were sitting.  Examples of moderate exercise include:  · Walking a mile in 15 minutes.  · Doing light yard work.  · Biking at an easy pace.  Most people should get at least 150 minutes (2 hours and 30 minutes) a week of moderate-intensity exercise to maintain their body weight.  Vigorous-intensity exercise  Vigorous-intensity exercise is any activity that gets you moving enough to burn at least six times more calories than if you were sitting. When you exercise at this intensity, you should be working hard enough that you are not able to carry on a conversation.  Examples of vigorous exercise include:  · Running.  · Playing a team sport, such as football, basketball, and soccer.  · Jumping rope.  Most people should get at least 75 minutes (1 hour and 15 minutes) a week of vigorous-intensity exercise to maintain their body weight.  How can exercise affect me?  When you exercise enough to burn more calories than you eat, you lose weight. Exercise also reduces body fat and builds muscle. The more muscle you have, the more calories you burn. Exercise also:  · Improves mood.  · Reduces stress and tension.  · Improves your overall fitness, flexibility, and endurance.  · Increases bone strength.  The amount of exercise you need to lose weight depends on:  · Your age.  · The type of exercise.  · Any health conditions you have.  · Your overall physical ability.  Talk to your health care provider about how much exercise you need and what types of activities are safe for you.  What actions can I take to lose weight?  Nutrition    · Make changes to your diet as told by your health care provider or diet and nutrition specialist (dietitian). This may include:  ? Eating fewer calories.  ? Eating more  protein.  ? Eating less unhealthy fats.  ? Eating a diet that includes fresh fruits and vegetables, whole grains, low-fat dairy products, and lean protein.  ? Avoiding foods with added fat, salt, and sugar.  · Drink plenty of water while you exercise to prevent dehydration or heat stroke.  Activity  · Choose an activity that you enjoy and set realistic goals. Your health care provider can help you make an exercise plan that works for you.  · Exercise at a moderate or vigorous intensity most days of the week.  ? The intensity of exercise may vary from person to person. You can tell how intense a workout is for you by paying attention to your breathing and heartbeat. Most people will notice their breathing and heartbeat get faster with more intense exercise.  · Do resistance training twice each week, such as:  ? Push-ups.  ? Sit-ups.  ? Lifting weights.  ? Using resistance bands.  · Getting short amounts of exercise can be just as helpful as long structured periods of exercise. If you have trouble finding time to exercise, try to include exercise in your daily routine.  ? Get up, stretch, and walk around every 30 minutes throughout the day.  ? Go for a walk during your lunch break.  ? Park your car farther away from your destination.  ? If you take public transportation, get off one stop early and walk the rest of the way.  ? Make phone calls while standing up and walking around.  ? Take the stairs instead of elevators or escalators.  · Wear comfortable clothes and shoes with good support.  · Do not exercise so much that you hurt yourself, feel dizzy, or get very short of breath.  Where to find more information  · [place] Department of Health and Human Services: www.hhs.gov  · Centers for Disease Control and Prevention (CDC): www.cdc.gov  Contact a health care provider:  · Before starting a new exercise program.  · If you have questions or concerns about your weight.  · If you have a medical problem that keeps you from  exercising.  Get help right away if you have any of the following while exercising:  · Injury.  · Dizziness.  · Difficulty breathing or shortness of breath that does not go away when you stop exercising.  · Chest pain.  · Rapid heartbeat.  Summary  · Being overweight increases your risk of heart disease, stroke, diabetes, high blood pressure, and several types of cancer.  · Losing weight happens when you burn more calories than you eat.  · Reducing the amount of calories you eat in addition to getting regular moderate or vigorous exercise each week helps you lose weight.  This information is not intended to replace advice given to you by your health care provider. Make sure you discuss any questions you have with your health care provider.  Document Revised: 12/31/2018 Document Reviewed: 12/31/2018  [employer] Patient Education © 2021 [employer]

## 2021-03-26 NOTE — PROGRESS NOTES
Subjective   Pita Hyatt is a 75 y.o. female.     Pita Hyatt age 75 comes in today for recheck.  She continues to have some swelling in her feet and legs and presently is not taking diuretic.  Is been evaluated by vascular in the past.  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  She denies chest pain shortness of breath.    Leg Swelling  This is a chronic problem. The current episode started more than 1 year ago. The problem occurs constantly. The problem has been unchanged. Pertinent negatives include no abdominal pain, anorexia, arthralgias, change in bowel habit, chest pain, chills, congestion, coughing, diaphoresis, fatigue, fever, headaches, joint swelling, myalgias, nausea, neck pain, numbness, rash, sore throat, swollen glands, urinary symptoms, vertigo, visual change, vomiting or weakness. The symptoms are aggravated by walking and standing. Treatments tried: elevation. The treatment provided mild relief.   Back Pain  This is a chronic problem. The current episode started more than 1 year ago. The problem occurs constantly. The problem has been waxing and waning since onset. The pain is present in the thoracic spine. The quality of the pain is described as aching. The pain does not radiate. The pain is at a severity of 5/10. The pain is moderate. The pain is the same all the time. The symptoms are aggravated by bending, standing and position. Stiffness is present in the morning. Pertinent negatives include no abdominal pain, bladder incontinence, bowel incontinence, chest pain, dysuria, fever, headaches, leg pain, numbness, paresis, paresthesias, pelvic pain, perianal numbness, tingling, weakness or weight loss. Risk factors include obesity, poor posture, lack of exercise and sedentary lifestyle. She has tried muscle relaxant and NSAIDs (tramadol) for the symptoms. The treatment provided significant  relief.        The following portions of the patient's history were reviewed and updated as appropriate: allergies, current medications, past family history, past medical history, past social history, past surgical history and problem list.    Review of Systems   Constitutional: Negative.  Negative for chills, diaphoresis, fatigue, fever and weight loss.   HENT: Negative.  Negative for congestion and sore throat.    Eyes: Negative.    Respiratory: Negative.  Negative for cough.    Cardiovascular: Positive for leg swelling. Negative for chest pain.   Gastrointestinal: Negative.  Negative for abdominal pain, anorexia, bowel incontinence, change in bowel habit, nausea and vomiting.   Genitourinary: Negative.  Negative for bladder incontinence, dysuria and pelvic pain.   Musculoskeletal: Positive for back pain. Negative for arthralgias, joint swelling, myalgias and neck pain.   Skin: Negative.  Negative for rash.   Neurological: Negative.  Negative for vertigo, tingling, weakness, numbness, headaches and paresthesias.   Psychiatric/Behavioral: Negative.        Objective   Physical Exam  Vitals and nursing note reviewed.   Constitutional:       General: She is not in acute distress.     Appearance: She is well-developed.   HENT:      Head: Normocephalic and atraumatic.      Right Ear: External ear normal.      Left Ear: External ear normal.      Nose: Nose normal.      Mouth/Throat:      Pharynx: No oropharyngeal exudate.   Eyes:      Pupils: Pupils are equal, round, and reactive to light.   Neck:      Thyroid: No thyromegaly.   Cardiovascular:      Rate and Rhythm: Normal rate and regular rhythm.      Heart sounds: Normal heart sounds. No murmur heard.   No friction rub.      Comments: Trace of edema in both lower extremities.  Pulmonary:      Effort: Pulmonary effort is normal. No respiratory distress.      Breath sounds: Normal breath sounds. No wheezing or rales.   Abdominal:      Palpations: Abdomen is soft.    Musculoskeletal:         General: Normal range of motion.      Cervical back: Normal range of motion and neck supple.      Thoracic back: Tenderness present.      Comments: This x-ray of her thoracic spine is reviewed and does show moderately severe degenerative disease.   Skin:     General: Skin is warm and dry.   Neurological:      Mental Status: She is alert and oriented to person, place, and time.   Psychiatric:         Thought Content: Thought content normal.         Assessment/Plan   Diagnoses and all orders for this visit:    1. Edema, unspecified type (Primary)  -     furosemide (Lasix) 20 MG tablet; Take 1 tablet by mouth Daily.  Dispense: 30 tablet; Refill: 3    2. Thoracic back pain, unspecified back pain laterality, unspecified chronicity  -     traMADol (ULTRAM) 50 MG tablet; Take 1 tablet by mouth Every 8 (Eight) Hours As Needed for Moderate Pain .  Dispense: 90 tablet; Refill: 0    shai report [id number] is reviewed.    BMI is noted to be 35.9 which is considered obese.  Diet and exercise information will be provided the patient.

## 2021-05-06 ENCOUNTER — EPISODE CHANGES (OUTPATIENT)
Dept: CASE MANAGEMENT | Facility: OTHER | Age: 76
End: 2021-05-06

## 2021-05-06 ENCOUNTER — HOSPITAL ENCOUNTER (EMERGENCY)
Facility: HOSPITAL | Age: 76
Discharge: HOME | End: 2021-05-06
Attending: EMERGENCY MEDICINE | Admitting: EMERGENCY MEDICINE

## 2021-05-06 ENCOUNTER — APPOINTMENT (OUTPATIENT)
Dept: GENERAL RADIOLOGY | Facility: HOSPITAL | Age: 76
End: 2021-05-06

## 2021-05-06 VITALS
TEMPERATURE: 98.7 F | WEIGHT: 220 LBS | HEART RATE: 80 BPM | DIASTOLIC BLOOD PRESSURE: 81 MMHG | RESPIRATION RATE: 20 BRPM | BODY MASS INDEX: 34.53 KG/M2 | SYSTOLIC BLOOD PRESSURE: 188 MMHG | HEIGHT: 67 IN | OXYGEN SATURATION: 100 %

## 2021-05-06 DIAGNOSIS — R07.89 ATYPICAL CHEST PAIN: Primary | ICD-10-CM

## 2021-05-06 DIAGNOSIS — D69.6 THROMBOCYTOPENIA (HCC): ICD-10-CM

## 2021-05-06 LAB
ALBUMIN SERPL-MCNC: 3.1 G/DL (ref 3.5–5.2)
ALBUMIN/GLOB SERPL: 1 G/DL
ALP SERPL-CCNC: 188 U/L (ref 39–117)
ALT SERPL W P-5'-P-CCNC: 32 U/L (ref 1–33)
ANION GAP SERPL CALCULATED.3IONS-SCNC: 7 MMOL/L (ref 5–15)
AST SERPL-CCNC: 88 U/L (ref 1–32)
BASOPHILS # BLD AUTO: 0.04 10*3/MM3 (ref 0–0.2)
BASOPHILS NFR BLD AUTO: 1.1 % (ref 0–1.5)
BILIRUB SERPL-MCNC: 1 MG/DL (ref 0–1.2)
BUN SERPL-MCNC: 13 MG/DL (ref 8–23)
BUN/CREAT SERPL: 15.9 (ref 7–25)
CALCIUM SPEC-SCNC: 9.7 MG/DL (ref 8.6–10.5)
CHLORIDE SERPL-SCNC: 108 MMOL/L (ref 98–107)
CO2 SERPL-SCNC: 22 MMOL/L (ref 22–29)
CREAT SERPL-MCNC: 0.82 MG/DL (ref 0.57–1)
DEPRECATED RDW RBC AUTO: 53.4 FL (ref 37–54)
EOSINOPHIL # BLD AUTO: 0.19 10*3/MM3 (ref 0–0.4)
EOSINOPHIL NFR BLD AUTO: 5 % (ref 0.3–6.2)
ERYTHROCYTE [DISTWIDTH] IN BLOOD BY AUTOMATED COUNT: 15.9 % (ref 12.3–15.4)
GFR SERPL CREATININE-BSD FRML MDRD: 67 ML/MIN/1.73
GLOBULIN UR ELPH-MCNC: 3.2 GM/DL
GLUCOSE SERPL-MCNC: 113 MG/DL (ref 65–99)
HCT VFR BLD AUTO: 32.9 % (ref 34–46.6)
HGB BLD-MCNC: 10.7 G/DL (ref 12–15.9)
IMM GRANULOCYTES # BLD AUTO: 0.08 10*3/MM3 (ref 0–0.05)
IMM GRANULOCYTES NFR BLD AUTO: 2.1 % (ref 0–0.5)
LIPASE SERPL-CCNC: 49 U/L (ref 13–60)
LYMPHOCYTES # BLD AUTO: 1.01 10*3/MM3 (ref 0.7–3.1)
LYMPHOCYTES NFR BLD AUTO: 26.8 % (ref 19.6–45.3)
MAGNESIUM SERPL-MCNC: 1.9 MG/DL (ref 1.6–2.4)
MCH RBC QN AUTO: 29.8 PG (ref 26.6–33)
MCHC RBC AUTO-ENTMCNC: 32.5 G/DL (ref 31.5–35.7)
MCV RBC AUTO: 91.6 FL (ref 79–97)
MONOCYTES # BLD AUTO: 0.4 10*3/MM3 (ref 0.1–0.9)
MONOCYTES NFR BLD AUTO: 10.6 % (ref 5–12)
NEUTROPHILS NFR BLD AUTO: 2.05 10*3/MM3 (ref 1.7–7)
NEUTROPHILS NFR BLD AUTO: 54.4 % (ref 42.7–76)
NRBC BLD AUTO-RTO: 0 /100 WBC (ref 0–0.2)
NT-PROBNP SERPL-MCNC: 44.6 PG/ML (ref 0–1800)
PLATELET # BLD AUTO: 107 10*3/MM3 (ref 140–450)
PMV BLD AUTO: 10.4 FL (ref 6–12)
POTASSIUM SERPL-SCNC: 4.1 MMOL/L (ref 3.5–5.2)
PROT SERPL-MCNC: 6.3 G/DL (ref 6–8.5)
QT INTERVAL: 408 MS
QTC INTERVAL: 461 MS
RBC # BLD AUTO: 3.59 10*6/MM3 (ref 3.77–5.28)
SODIUM SERPL-SCNC: 137 MMOL/L (ref 136–145)
TROPONIN T SERPL-MCNC: <0.01 NG/ML (ref 0–0.03)
WBC # BLD AUTO: 3.77 10*3/MM3 (ref 3.4–10.8)

## 2021-05-06 PROCEDURE — 83880 ASSAY OF NATRIURETIC PEPTIDE: CPT | Performed by: EMERGENCY MEDICINE

## 2021-05-06 PROCEDURE — 84484 ASSAY OF TROPONIN QUANT: CPT | Performed by: EMERGENCY MEDICINE

## 2021-05-06 PROCEDURE — 80053 COMPREHEN METABOLIC PANEL: CPT | Performed by: EMERGENCY MEDICINE

## 2021-05-06 PROCEDURE — 71045 X-RAY EXAM CHEST 1 VIEW: CPT

## 2021-05-06 PROCEDURE — 83690 ASSAY OF LIPASE: CPT | Performed by: EMERGENCY MEDICINE

## 2021-05-06 PROCEDURE — 83735 ASSAY OF MAGNESIUM: CPT | Performed by: EMERGENCY MEDICINE

## 2021-05-06 PROCEDURE — 93005 ELECTROCARDIOGRAM TRACING: CPT | Performed by: EMERGENCY MEDICINE

## 2021-05-06 PROCEDURE — 99284 EMERGENCY DEPT VISIT MOD MDM: CPT

## 2021-05-06 PROCEDURE — 93010 ELECTROCARDIOGRAM REPORT: CPT | Performed by: INTERNAL MEDICINE

## 2021-05-06 PROCEDURE — 85025 COMPLETE CBC W/AUTO DIFF WBC: CPT | Performed by: EMERGENCY MEDICINE

## 2021-05-06 RX ORDER — SODIUM CHLORIDE 0.9 % (FLUSH) 0.9 %
10 SYRINGE (ML) INJECTION AS NEEDED
Status: DISCONTINUED | OUTPATIENT
Start: 2021-05-06 | End: 2021-05-06 | Stop reason: HOSPADM

## 2021-05-07 ENCOUNTER — PATIENT OUTREACH (OUTPATIENT)
Dept: CASE MANAGEMENT | Facility: OTHER | Age: 76
End: 2021-05-07

## 2021-05-07 NOTE — OUTREACH NOTE
"Patient Outreach Note    Pt seen at [facility] ED 5/6/21 for chest pain. ACM outreach call made to pt. Introduced self and ACM role. Pt denies any further chest pain. She states her heart is fine, it was just \"indigestion\". States she \"ate grapefruit and shouldn't have\". Majority of call was spent with pt expressing dissatisfaction with ED experience. Offered Pt Relations contact, pt declined. Discussed pcp f/u and she states she plans to call for an appt. Discussed AWV due, she states she will schedule. Unable to further outreach, pt states \"I will never return to your [facility] ED\" and call ended.    Uma Thakkar RN  Ambulatory     5/7/2021, 15:54 EDT      "

## 2021-05-14 ENCOUNTER — OFFICE VISIT (OUTPATIENT)
Dept: FAMILY MEDICINE CLINIC | Facility: CLINIC | Age: 76
End: 2021-05-14

## 2021-05-14 VITALS
BODY MASS INDEX: 35.11 KG/M2 | DIASTOLIC BLOOD PRESSURE: 74 MMHG | WEIGHT: 223.7 LBS | OXYGEN SATURATION: 98 % | HEIGHT: 67 IN | TEMPERATURE: 98.6 F | HEART RATE: 85 BPM | SYSTOLIC BLOOD PRESSURE: 132 MMHG

## 2021-05-14 DIAGNOSIS — J30.9 ALLERGIC RHINITIS, UNSPECIFIED SEASONALITY, UNSPECIFIED TRIGGER: ICD-10-CM

## 2021-05-14 DIAGNOSIS — R60.0 EDEMA OF EXTREMITIES: ICD-10-CM

## 2021-05-14 DIAGNOSIS — R06.83 SNORING: ICD-10-CM

## 2021-05-14 DIAGNOSIS — R60.9 EDEMA, UNSPECIFIED TYPE: ICD-10-CM

## 2021-05-14 DIAGNOSIS — K74.60 CIRRHOSIS OF LIVER WITHOUT ASCITES, UNSPECIFIED HEPATIC CIRRHOSIS TYPE (HCC): ICD-10-CM

## 2021-05-14 DIAGNOSIS — R35.1 NOCTURIA: ICD-10-CM

## 2021-05-14 DIAGNOSIS — K21.9 GASTROESOPHAGEAL REFLUX DISEASE: ICD-10-CM

## 2021-05-14 DIAGNOSIS — E55.9 VITAMIN D DEFICIENCY: ICD-10-CM

## 2021-05-14 DIAGNOSIS — H04.123 DRY EYES, BILATERAL: ICD-10-CM

## 2021-05-14 DIAGNOSIS — E03.9 HYPOTHYROIDISM, UNSPECIFIED TYPE: ICD-10-CM

## 2021-05-14 PROCEDURE — 99215 OFFICE O/P EST HI 40 MIN: CPT | Performed by: FAMILY MEDICINE

## 2021-05-14 RX ORDER — ERGOCALCIFEROL 1.25 MG/1
50000 CAPSULE ORAL WEEKLY
Qty: 12 CAPSULE | Refills: 1 | Status: SHIPPED | OUTPATIENT
Start: 2021-05-14

## 2021-05-14 RX ORDER — OMEPRAZOLE 20 MG/1
20 CAPSULE, DELAYED RELEASE ORAL DAILY
Qty: 90 CAPSULE | Refills: 1 | Status: SHIPPED | OUTPATIENT
Start: 2021-05-14

## 2021-05-14 RX ORDER — FLUTICASONE PROPIONATE 50 MCG
2 SPRAY, SUSPENSION (ML) NASAL DAILY
Qty: 16 G | Refills: 5 | Status: SHIPPED | OUTPATIENT
Start: 2021-05-14 | End: 2022-07-07

## 2021-05-14 RX ORDER — CYCLOSPORINE 0.5 MG/ML
1 EMULSION OPHTHALMIC EVERY 12 HOURS
Qty: 5.5 ML | Refills: 5 | Status: SHIPPED | OUTPATIENT
Start: 2021-05-14 | End: 2023-01-12

## 2021-05-14 RX ORDER — FUROSEMIDE 20 MG/1
20 TABLET ORAL DAILY
Qty: 90 TABLET | Refills: 1 | Status: SHIPPED | OUTPATIENT
Start: 2021-05-14 | End: 2021-12-09 | Stop reason: SDUPTHER

## 2021-05-14 RX ORDER — LEVOTHYROXINE SODIUM 0.1 MG/1
100 TABLET ORAL DAILY
Qty: 90 TABLET | Refills: 1 | Status: SHIPPED | OUTPATIENT
Start: 2021-05-14 | End: 2022-01-10 | Stop reason: SDUPTHER

## 2021-05-24 ENCOUNTER — OFFICE VISIT (OUTPATIENT)
Dept: SLEEP MEDICINE | Facility: HOSPITAL | Age: 76
End: 2021-05-24

## 2021-05-24 VITALS
HEART RATE: 85 BPM | BODY MASS INDEX: 35 KG/M2 | WEIGHT: 223 LBS | SYSTOLIC BLOOD PRESSURE: 156 MMHG | HEIGHT: 67 IN | DIASTOLIC BLOOD PRESSURE: 65 MMHG | OXYGEN SATURATION: 98 %

## 2021-05-24 DIAGNOSIS — R06.83 SNORING: Primary | ICD-10-CM

## 2021-05-24 DIAGNOSIS — G47.19 EXCESSIVE DAYTIME SLEEPINESS: ICD-10-CM

## 2021-05-24 PROCEDURE — 99214 OFFICE O/P EST MOD 30 MIN: CPT | Performed by: NURSE PRACTITIONER

## 2021-05-24 NOTE — PROGRESS NOTES
New Patient Sleep Medicine Consultation    Encounter Date: 5/24/2021         Patient's PCP: Arash Martins MD  Referring provider: Arash Martins MD  Reason for consultation chief complaint: snoring, loud disruptive snoring, excessive daytime sleepiness and insomnia      Pita Hyatt admits to snoring, unrestful sleep, excessive daytime sleepiness, irritability, sleeping less than 6 hours per night, Disturbed or restless sleep, Up to the bathroom at night, difficulty falling asleep and difficulty staying asleep for > 5 years. She denies cataplexy, sleep paralysis, or hypnagogic hallucinations. She takes no medicine for sleep. She has occasional sleepiness with driving.Denies close calls or accidents related to falling asleep at the wheel.  She naps most days for about one hour. She has never had a sleep study. She sleeps in a bed with her small dog.     Pita Hyatt is a 76 y.o. female whose bedtime is ~ 2200. She  falls asleep after 0-60 minutes, and is up 2-3 times per night. She wakes up ~ 0930. She endorses 4-8 hours of sleep.     She used to smoke, but has not smoked for years. Smoking history: smoked 0.5-1 ppds from age 19 until 37    She drinks 0 cups of coffee, 0 teas, and 0 sodas per day. She drinks 0 alcoholic beverages per week.    Marital status:    Occupation: business owner   Children: 2  1 brothers and 1 sisters  FH of sleep disorders: not that she knows of       Past Medical History:   Diagnosis Date   • Acquired hypothyroidism    • Acute bronchitis    • Acute gastritis    • Acute maxillary sinusitis    • Acute maxillary sinusitis, unspecified    • Acute serous otitis media     R   • Acute sinusitis     Could be R mastoid    • Allergic rhinitis    • Anxiety state    • Chronic sinusitis    • Conjunctivitis    • Cough    • Degenerative joint disease of shoulder region    • Depressive disorder     improving   • Diarrhea    • Dysfunction of eustachian tube    • Epigastric  pain    • Female stress incontinence    • Forgetfulness    • Functional diarrhea    • Gynecological Examination    • Knee joint replaced by other means    • Malaise and fatigue    • Menopause    • Nausea and vomiting    • Other Chest pain    • Polyp of colon    • Preop examination, unspecified    • Rhinitis medicamentosa    • Shoulder pain    • Skin tag- removal    • Subclinical hypothyroidism    • Vagal reaction      Social History     Socioeconomic History   • Marital status:      Spouse name: Not on file   • Number of children: Not on file   • Years of education: Not on file   • Highest education level: Not on file   Tobacco Use   • Smoking status: Former Smoker     Quit date:      Years since quittin.4   • Smokeless tobacco: Never Used   Substance and Sexual Activity   • Alcohol use: Not Currently     Comment: occasional   • Drug use: No   • Sexual activity: Defer     Birth control/protection: Surgical     Comment: Hysterectomy     Family History   Problem Relation Age of Onset   • Hypertension Other    • Stroke Other    • Diabetes Other    • Ulcers Other    • Cancer Other    • Stroke Father    • Sinusitis Mother          Review of Systems:  Constitutional: negative  Eyes: negative  Ears, nose, mouth, throat, and face: negative  Respiratory: negative  Cardiovascular: negative  Gastrointestinal: negative  Genitourinary:negative  Integument/breast: negative  Hematologic/lymphatic: negative  Musculoskeletal:negative  Neurological: negative  Behavioral/Psych: negative  Endocrine: negative  Allergic/Immunologic: negative Patient advised to discuss any positive ROS with PCP.      [place] - 16      Vitals:    21 1320   BP: 156/65   Pulse: 85   SpO2: 98%           21  1320   Weight: 101 kg (223 lb)       Body mass index is 34.92 kg/m². Patient's Body mass index is 34.92 kg/m². indicating that she is obese (BMI >30). Obesity-related health conditions include the following: GERD. Obesity is  "unchanged. BMI is is above average; BMI management plan is completed. We discussed portion control and increasing exercise..      Neck circumference: 16\"          General: Alert. Cooperative. Well developed. No acute distress.             Head:  Normocephalic. Symmetrical. Atraumatic.              Eyes: Sclera clear. No icterus.Normal EOM.             Ears: No deformities. Normal hearing.             Nose: No septal deviation. No drainage.          Throat: No oral lesions. No thrush. Moist mucous membranes. Trachea midline    Tongue is normal    Dentition is fair       Pharynx: Posterior pharyngeal pillars are narrow    Mallampati score of III (soft and hard palate and base of uvula visible)    Pharynx is nonerythematous   Chest Wall:  Normal shape. Symmetric expansion with respiration. No tenderness.          Lungs:  Clear to auscultation bilaterally. No wheezes. No rhonchi. No rales. Respirations regular, even and unlabored.            Heart:  Regular rhythm and normal rate. Normal S1 and S2. No murmur.  Extremities:  Moves all extremities well. No edema.           Pulses: Pulses palpable and equal bilaterally.               Skin: Dry. Intact. No bleeding. No rash.           Neuro: Moves all 4 extremities and cranial nerves grossly intact.  Psychiatric: Normal mood and affect.      Current Outpatient Medications:   •  cycloSPORINE (RESTASIS) 0.05 % ophthalmic emulsion, Apply 1 drop to eye(s) as directed by provider Every 12 (Twelve) Hours., Disp: 5.5 mL, Rfl: 5  •  fluticasone (FLONASE) 50 MCG/ACT nasal spray, 2 sprays into the nostril(s) as directed by provider Daily., Disp: 16 g, Rfl: 5  •  furosemide (Lasix) 20 MG tablet, Take 1 tablet by mouth Daily., Disp: 90 tablet, Rfl: 1  •  levothyroxine (SYNTHROID, LEVOTHROID) 100 MCG tablet, Take 1 tablet by mouth Daily., Disp: 90 tablet, Rfl: 1  •  omeprazole (priLOSEC) 20 MG capsule, Take 1 capsule by mouth Daily., Disp: 90 capsule, Rfl: 1  •  traMADol (ULTRAM) 50 MG " tablet, Take 1 tablet by mouth Every 8 (Eight) Hours As Needed for Moderate Pain ., Disp: 90 tablet, Rfl: 0  •  vitamin D (ERGOCALCIFEROL) 1.25 MG (09605 UT) capsule capsule, Take 1 capsule by mouth 1 (One) Time Per Week., Disp: 12 capsule, Rfl: 1    Lab Results   Component Value Date    WBC 3.77 05/06/2021    HGB 10.7 (L) 05/06/2021    HCT 32.9 (L) 05/06/2021    MCV 91.6 05/06/2021     (L) 05/06/2021     Lab Results   Component Value Date    GLUCOSE 113 (H) 05/06/2021    CALCIUM 9.7 05/06/2021     05/06/2021    K 4.1 05/06/2021    CO2 22.0 05/06/2021     (H) 05/06/2021    BUN 13 05/06/2021    CREATININE 0.82 05/06/2021    EGFRIFNONA 67 05/06/2021    BCR 15.9 05/06/2021    ANIONGAP 7.0 05/06/2021     Lab Results   Component Value Date    INR 1.19 05/25/2018    INR 1.15 09/12/2017    INR 1.15 04/11/2017    PROTIME 14.8 05/25/2018    PROTIME 14.6 09/12/2017    PROTIME 14.7 04/11/2017     Lab Results   Component Value Date    TROPONINT <0.010 05/06/2021       No results found for: PHART, ITW4LNW, PO2ART]    Contraindications to home sleep test: none    Assessment and Plan:    1. Excessive daytime sleepiness- New (to me), additional work-up planned (4)  1. Check HST  2. Sleepy driving tips   3. Good sleep hygiene   4. RTC in 2 weeks with study results   2.   Snoring- New to me, additional work-up planned    1.  As above       I obtained a brief history from the patient, reviewed the medical problems and current medications, and made medical decisions regarding treatment based on that information.   I spent 30 minutes caring for [place] on this date of service. This time includes time spent by me in the following activities: preparing for the visit, reviewing tests, obtaining and/or reviewing a separately obtained history, performing a medically appropriate examination and/or evaluation, counseling and educating the patient/family/caregiver, ordering medications, tests, or procedures and documenting  information in the medical record. I answered all of the patient's questions and she verbalized understanding. Discussion involved effects of untreated sleep apnea/chronic sleep loss, sleep hygiene, sleep study.           RTC 2 weeks after study for results.             This document has been electronically signed by SHAHZAD Drummond on May 24, 2021 13:23 CDT          This document has been electronically signed by SHAHZAD Drummond on May 24, 2021         CC: Arash Martins MD Fralish, Billy Kevin, MD

## 2021-05-25 ENCOUNTER — PROCEDURE VISIT (OUTPATIENT)
Dept: FAMILY MEDICINE CLINIC | Facility: CLINIC | Age: 76
End: 2021-05-25

## 2021-05-25 VITALS
OXYGEN SATURATION: 96 % | WEIGHT: 224.7 LBS | DIASTOLIC BLOOD PRESSURE: 66 MMHG | HEART RATE: 89 BPM | TEMPERATURE: 99.1 F | BODY MASS INDEX: 35.27 KG/M2 | HEIGHT: 67 IN | SYSTOLIC BLOOD PRESSURE: 128 MMHG

## 2021-05-25 DIAGNOSIS — L91.8 SKIN TAGS, MULTIPLE ACQUIRED: ICD-10-CM

## 2021-05-25 DIAGNOSIS — L57.0 ACTINIC KERATOSES: Primary | ICD-10-CM

## 2021-05-25 PROCEDURE — 99212 OFFICE O/P EST SF 10 MIN: CPT | Performed by: FAMILY MEDICINE

## 2021-05-25 NOTE — PROGRESS NOTES
"Chief Complaint  Skin Problem (here for removal, face)  ' Skin cancers need removing. Lesion around neck stay irritated by collar, need removing also'.  Subjective          Review of Systems    Pita Hyatt presents to [facility]  Other  This is a chronic problem. The current episode started more than 1 year ago. The problem occurs constantly. The problem has been gradually worsening. Associated symptoms comments: Precancerous skin lesions. She has tried nothing for the symptoms. The treatment provided no relief.       Objective   Vital Signs:   /66   Pulse 89   Temp 99.1 °F (37.3 °C) (Temporal)   Ht 170.2 cm (67\")   Wt 102 kg (224 lb 11.2 oz)   SpO2 96%   BMI 35.19 kg/m²     Physical Exam  Vitals and nursing note reviewed.   Constitutional:       Appearance: Normal appearance. She is well-developed.   HENT:      Head: Normocephalic and atraumatic.      Right Ear: Tympanic membrane, ear canal and external ear normal. There is no impacted cerumen.      Left Ear: Tympanic membrane, ear canal and external ear normal. There is no impacted cerumen.      Nose: Nose normal.      Mouth/Throat:      Mouth: Mucous membranes are moist.      Pharynx: Oropharynx is clear. No oropharyngeal exudate or posterior oropharyngeal erythema.      Comments: Strawberry tongue.  Eyes:      General: No scleral icterus.        Right eye: No discharge.      Extraocular Movements: Extraocular movements intact.      Conjunctiva/sclera: Conjunctivae normal.      Pupils: Pupils are equal, round, and reactive to light.      Comments: Exophthalmos basim   R eye impaired.    Cardiovascular:      Rate and Rhythm: Normal rate and regular rhythm.      Heart sounds: Normal heart sounds.   Pulmonary:      Effort: Pulmonary effort is normal.      Breath sounds: Normal breath sounds. No stridor. No wheezing, rhonchi or rales.   Chest:      Chest wall: No tenderness.   Abdominal:      General: Bowel sounds are " normal. There is no distension.      Palpations: Abdomen is soft. There is no mass.      Tenderness: There is no abdominal tenderness. There is no right CVA tenderness, left CVA tenderness, guarding or rebound.      Hernia: No hernia is present.   Musculoskeletal:         General: No swelling, tenderness, deformity or signs of injury. Normal range of motion.      Cervical back: Normal range of motion and neck supple.      Right lower leg: No edema.      Left lower leg: No edema.   Skin:     General: Skin is warm and dry.      Capillary Refill: Capillary refill takes 2 to 3 seconds.      Coloration: Skin is not jaundiced or pale.      Findings: Lesion present. No bruising, erythema or rash.      Comments: Facial Actinic Keratosis   Neurological:      Mental Status: She is alert and oriented to person, place, and time.      Cranial Nerves: No cranial nerve deficit.      Sensory: No sensory deficit.      Motor: No weakness.      Coordination: Coordination normal.      Gait: Gait normal.      Deep Tendon Reflexes: Reflexes normal.   Psychiatric:         Mood and Affect: Mood normal.         Speech: Speech normal.         Behavior: Behavior normal.         Thought Content: Thought content normal.         Judgment: Judgment normal.      Discussed risk benefit of destruction of precancerous skin lesions. Pt desires procedure.  Result Review :          Destruction of Lesion    Date/Time: 5/25/2021 3:52 PM  Performed by: Arash Martins MD  Authorized by: Arash Martins MD   Local anesthesia used: yes    Anesthesia:  Local anesthesia used: yes  Anesthetic total: 2 mL  Comments: 6 mm horned Keratosis L forehead , 8 mm AK Mid forehead, 6 mm AK R nose , 6 skin tags around neck. Lesions were cleansed numbed with lidocaine, and then destroyed with Hyfrecator.  9 lesions destroyed. Discussed wound care.             Assessment and Plan    Diagnoses and all orders for this visit:    1. Actinic keratoses (Primary)  -      Destruction of Lesion    2. Skin tags, multiple acquired  -     Destruction of Lesion        Follow Up {Instructions Charge Capture  Follow-up Communications :23}  Return for  Keep next scheduled follow up.  Patient was given instructions and counseling regarding her condition or for health maintenance advice. Please see specific information pulled into the AVS if appropriate.         This document has been electronically signed by Arash Martins MD

## 2021-07-20 ENCOUNTER — APPOINTMENT (OUTPATIENT)
Dept: SLEEP MEDICINE | Facility: HOSPITAL | Age: 76
End: 2021-07-20

## 2021-08-12 ENCOUNTER — APPOINTMENT (OUTPATIENT)
Dept: SLEEP MEDICINE | Facility: HOSPITAL | Age: 76
End: 2021-08-12

## 2021-09-30 DIAGNOSIS — R60.0 BILATERAL EDEMA OF LOWER EXTREMITY: Primary | ICD-10-CM

## 2021-11-11 DIAGNOSIS — E03.9 HYPOTHYROIDISM, UNSPECIFIED TYPE: ICD-10-CM

## 2021-11-11 DIAGNOSIS — K21.9 GASTROESOPHAGEAL REFLUX DISEASE: ICD-10-CM

## 2021-11-11 RX ORDER — LEVOTHYROXINE SODIUM 0.1 MG/1
TABLET ORAL
Qty: 90 TABLET | Refills: 1 | OUTPATIENT
Start: 2021-11-11

## 2021-11-11 RX ORDER — OMEPRAZOLE 20 MG/1
CAPSULE, DELAYED RELEASE ORAL
Qty: 90 CAPSULE | Refills: 1 | OUTPATIENT
Start: 2021-11-11

## 2021-12-06 DIAGNOSIS — E55.9 VITAMIN D DEFICIENCY: ICD-10-CM

## 2021-12-06 RX ORDER — ERGOCALCIFEROL 1.25 MG/1
CAPSULE ORAL
Qty: 12 CAPSULE | Refills: 1 | OUTPATIENT
Start: 2021-12-06

## 2021-12-09 ENCOUNTER — OFFICE VISIT (OUTPATIENT)
Dept: FAMILY MEDICINE CLINIC | Facility: CLINIC | Age: 76
End: 2021-12-09

## 2021-12-09 VITALS
HEART RATE: 81 BPM | DIASTOLIC BLOOD PRESSURE: 86 MMHG | OXYGEN SATURATION: 98 % | BODY MASS INDEX: 35.81 KG/M2 | TEMPERATURE: 97.8 F | WEIGHT: 228.13 LBS | HEIGHT: 67 IN | RESPIRATION RATE: 20 BRPM | SYSTOLIC BLOOD PRESSURE: 138 MMHG

## 2021-12-09 DIAGNOSIS — R42 DIZZINESS: ICD-10-CM

## 2021-12-09 DIAGNOSIS — M19.90 CHRONIC ARTHRITIS: Chronic | ICD-10-CM

## 2021-12-09 DIAGNOSIS — R60.9 EDEMA, UNSPECIFIED TYPE: Chronic | ICD-10-CM

## 2021-12-09 DIAGNOSIS — N39.0 ACUTE UTI: Primary | ICD-10-CM

## 2021-12-09 DIAGNOSIS — K74.60 CIRRHOSIS OF LIVER WITHOUT ASCITES, UNSPECIFIED HEPATIC CIRRHOSIS TYPE (HCC): Chronic | ICD-10-CM

## 2021-12-09 DIAGNOSIS — R53.1 WEAKNESS: ICD-10-CM

## 2021-12-09 DIAGNOSIS — D69.6 THROMBOCYTOPENIA (HCC): Chronic | ICD-10-CM

## 2021-12-09 DIAGNOSIS — R32 URINARY INCONTINENCE, UNSPECIFIED TYPE: Chronic | ICD-10-CM

## 2021-12-09 DIAGNOSIS — H66.93 BILATERAL OTITIS MEDIA, UNSPECIFIED OTITIS MEDIA TYPE: ICD-10-CM

## 2021-12-09 LAB
BILIRUB BLD-MCNC: NEGATIVE MG/DL
CLARITY, POC: ABNORMAL
COLOR UR: ABNORMAL
EXPIRATION DATE: ABNORMAL
GLUCOSE UR STRIP-MCNC: NEGATIVE MG/DL
KETONES UR QL: NEGATIVE
LEUKOCYTE EST, POC: ABNORMAL
Lab: ABNORMAL
NITRITE UR-MCNC: POSITIVE MG/ML
PH UR: 6 [PH] (ref 5–8)
PROT UR STRIP-MCNC: NEGATIVE MG/DL
RBC # UR STRIP: NEGATIVE /UL
SP GR UR: 1.02 (ref 1–1.03)
UROBILINOGEN UR QL: NORMAL

## 2021-12-09 PROCEDURE — 87186 SC STD MICRODIL/AGAR DIL: CPT | Performed by: NURSE PRACTITIONER

## 2021-12-09 PROCEDURE — 99215 OFFICE O/P EST HI 40 MIN: CPT | Performed by: NURSE PRACTITIONER

## 2021-12-09 PROCEDURE — 93005 ELECTROCARDIOGRAM TRACING: CPT | Performed by: NURSE PRACTITIONER

## 2021-12-09 PROCEDURE — 81003 URINALYSIS AUTO W/O SCOPE: CPT | Performed by: NURSE PRACTITIONER

## 2021-12-09 PROCEDURE — 87086 URINE CULTURE/COLONY COUNT: CPT | Performed by: NURSE PRACTITIONER

## 2021-12-09 PROCEDURE — 93010 ELECTROCARDIOGRAM REPORT: CPT | Performed by: INTERNAL MEDICINE

## 2021-12-09 RX ORDER — HYDROCHLOROTHIAZIDE 25 MG/1
25 TABLET ORAL DAILY
COMMUNITY
End: 2022-01-10 | Stop reason: SDUPTHER

## 2021-12-09 RX ORDER — CEPHALEXIN 500 MG/1
500 CAPSULE ORAL 2 TIMES DAILY
Qty: 20 CAPSULE | Refills: 0 | Status: SHIPPED | OUTPATIENT
Start: 2021-12-09 | End: 2022-01-10

## 2021-12-09 RX ORDER — TRAMADOL HYDROCHLORIDE 50 MG/1
50 TABLET ORAL EVERY 8 HOURS PRN
Qty: 30 TABLET | Refills: 0 | Status: SHIPPED | OUTPATIENT
Start: 2021-12-09 | End: 2023-01-12

## 2021-12-09 RX ORDER — FUROSEMIDE 20 MG/1
20 TABLET ORAL DAILY
Qty: 30 TABLET | Refills: 0 | Status: SHIPPED | OUTPATIENT
Start: 2021-12-09 | End: 2022-01-10

## 2021-12-09 RX ORDER — LEVOCETIRIZINE DIHYDROCHLORIDE 5 MG/1
2.5 TABLET, FILM COATED ORAL EVERY EVENING
Qty: 30 TABLET | Refills: 2 | Status: SHIPPED | OUTPATIENT
Start: 2021-12-09 | End: 2022-01-10

## 2021-12-09 RX ORDER — FLUCONAZOLE 150 MG/1
150 TABLET ORAL ONCE AS NEEDED
Qty: 1 TABLET | Refills: 0 | Status: SHIPPED | OUTPATIENT
Start: 2021-12-09 | End: 2022-01-10

## 2021-12-09 NOTE — PATIENT INSTRUCTIONS
Urinary Tract Infection, Adult    A urinary tract infection (UTI) is an infection of any part of the urinary tract. The urinary tract includes the kidneys, ureters, bladder, and urethra. These organs make, store, and get rid of urine in the body.  Your health care provider may use other names to describe the infection. An upper UTI affects the ureters and kidneys (pyelonephritis). A lower UTI affects the bladder (cystitis) and urethra (urethritis).  What are the causes?  Most urinary tract infections are caused by bacteria in your genital area, around the entrance to your urinary tract (urethra). These bacteria grow and cause inflammation of your urinary tract.  What increases the risk?  You are more likely to develop this condition if:  · You have a urinary catheter that stays in place (indwelling).  · You are not able to control when you urinate or have a bowel movement (you have incontinence).  · You are female and you:  ? Use a spermicide or diaphragm for birth control.  ? Have low estrogen levels.  ? Are pregnant.  · You have certain genes that increase your risk (genetics).  · You are sexually active.  · You take antibiotic medicines.  · You have a condition that causes your flow of urine to slow down, such as:  ? An enlarged prostate, if you are male.  ? Blockage in your urethra (stricture).  ? A kidney stone.  ? A nerve condition that affects your bladder control (neurogenic bladder).  ? Not getting enough to drink, or not urinating often.  · You have certain medical conditions, such as:  ? Diabetes.  ? A weak disease-fighting system (immunesystem).  ? Sickle cell disease.  ? Gout.  ? Spinal cord injury.  What are the signs or symptoms?  Symptoms of this condition include:  · Needing to urinate right away (urgently).  · Frequent urination or passing small amounts of urine frequently.  · Pain or burning with urination.  · Blood in the urine.  · Urine that smells bad or unusual.  · Trouble urinating.  · Cloudy  urine.  · Vaginal discharge, if you are female.  · Pain in the abdomen or the lower back.  You may also have:  · Vomiting or a decreased appetite.  · Confusion.  · Irritability or tiredness.  · A fever.  · Diarrhea.  The first symptom in older adults may be confusion. In some cases, they may not have any symptoms until the infection has worsened.  How is this diagnosed?  This condition is diagnosed based on your medical history and a physical exam. You may also have other tests, including:  · Urine tests.  · Blood tests.  · Tests for sexually transmitted infections (STIs).  If you have had more than one UTI, a cystoscopy or imaging studies may be done to determine the cause of the infections.  How is this treated?  Treatment for this condition includes:  · Antibiotic medicine.  · Over-the-counter medicines to treat discomfort.  · Drinking enough water to stay hydrated.  If you have frequent infections or have other conditions such as a kidney stone, you may need to see a health care provider who specializes in the urinary tract (urologist).  In rare cases, urinary tract infections can cause sepsis. Sepsis is a life-threatening condition that occurs when the body responds to an infection. Sepsis is treated in the hospital with IV antibiotics, fluids, and other medicines.  Follow these instructions at home:    Medicines  · Take over-the-counter and prescription medicines only as told by your health care provider.  · If you were prescribed an antibiotic medicine, take it as told by your health care provider. Do not stop using the antibiotic even if you start to feel better.  General instructions  · Make sure you:  ? Empty your bladder often and completely. Do not hold urine for long periods of time.  ? Empty your bladder after sex.  ? Wipe from front to back after a bowel movement if you are female. Use each tissue one time when you wipe.  · Drink enough fluid to keep your urine pale yellow.  · Keep all follow-up  visits as told by your health care provider. This is important.  Contact a health care provider if:  · Your symptoms do not get better after 1-2 days.  · Your symptoms go away and then return.  Get help right away if you have:  · Severe pain in your back or your lower abdomen.  · A fever.  · Nausea or vomiting.  Summary  · A urinary tract infection (UTI) is an infection of any part of the urinary tract, which includes the kidneys, ureters, bladder, and urethra.  · Most urinary tract infections are caused by bacteria in your genital area, around the entrance to your urinary tract (urethra).  · Treatment for this condition often includes antibiotic medicines.  · If you were prescribed an antibiotic medicine, take it as told by your health care provider. Do not stop using the antibiotic even if you start to feel better.  · Keep all follow-up visits as told by your health care provider. This is important.  This information is not intended to replace advice given to you by your health care provider. Make sure you discuss any questions you have with your health care provider.  Document Revised: 12/05/2019 Document Reviewed: 06/27/2019  [employer] Patient Education © 2021 [employer]      Otitis Media, Adult    Otitis media occurs when there is inflammation and fluid in the middle ear space with signs and symptoms of an acute infection. The middle ear is a part of the ear that contains bones for hearing as well as air that helps send sounds to the brain. When infected fluid builds up in this space, it causes pressure and results in symptoms of acute otitis media. The eustachian tube connects the middle ear to the back of the nose (nasopharynx) and normally allows air into the middle ear space. If the eustachian tube becomes blocked, fluid can build up and become infected.  What are the causes?  This condition is caused by a blockage in the eustachian tube. This can be caused by an object like mucus, or by swelling (edema)  of the tube. Problems that can cause a blockage include:  · A cold or other upper respiratory infection.  · Allergies.  · An irritant, such as tobacco smoke.  · Enlarged adenoids. The adenoids are areas of soft tissue located high in the back of the throat, behind the nose and the roof of the mouth. They are part of the body's defense system (immune system).  · A mass in the nasopharynx.  · Damage to the ear caused by pressure changes (barotrauma).  What are the signs or symptoms?  Symptoms of this condition include:  · Ear pain.  · Fever.  · Decreased hearing.  · Tiredness (lethargy).  · Fluid leaking from the ear, if the eardrum is ruptured or has burst.  · Ringing in the ear.  How is this diagnosed?    This condition is diagnosed with a physical exam. During the exam, your health care provider will use an instrument called an otoscope to look in your ear and check for redness, swelling, and fluid. He or she will also ask about your symptoms.  Your health care provider may also order tests, such as:  · A pneumatic otoscopy. This is a test to check the movement of the eardrum. It is done by squeezing a small amount of air into the ear.  · A tympanogram is a test that shows how well the eardrum moves in response to air pressure in the ear canal. It provides a graph for your health care provider to review.  How is this treated?  This condition can go away on its own within 3-5 days. But if the condition is caused by a bacterial infection and does not go away on its own, or if it keeps coming back, your health care provider may:  · Prescribe antibiotic medicine to treat the infection.  · Prescribe or recommend medicines to control pain.  Follow these instructions at home:  · Take over-the-counter and prescription medicines only as told by your health care provider.  · If you were prescribed an antibiotic medicine, take it as told by your health care provider. Do not stop taking the antibiotic even if you start to feel  better.  · Keep all follow-up visits as told by your health care provider. This is important.  Contact a health care provider if:  · You have bleeding from your nose.  · There is a lump on your neck.  · You are not feeling better in 5 days.  · You feel worse instead of better.  Get help right away if:  · You have severe pain that is not controlled with medicine.  · You have swelling, redness, or pain around your ear.  · You have stiffness in your neck.  · A part of your face is not moving (paralyzed).  · The bone behind your ear (mastoid) is tender when you touch it.  · You develop a severe headache.  Summary  · Otitis media is redness, soreness, and swelling of the middle ear, usually resulting in pain.  · This condition can go away on its own within 3-5 days.  · If the problem does not go away in 3-5 days, your health care provider may prescribe or recommend medicines to treat the infection or your symptoms.  · If you were prescribed an antibiotic medicine, take it as told by your health care provider.  · Follow all instructions you were given by your health care provider.  This information is not intended to replace advice given to you by your health care provider. Make sure you discuss any questions you have with your health care provider.  Document Revised: 11/19/2020 Document Reviewed: 11/19/2020  [employer] Patient Education © 2021 [employer]

## 2021-12-09 NOTE — PROGRESS NOTES
"Chief Complaint  weak/shakey/vomiting x thursday    Subjective          Pita Hyatt presents to [facility] PRIMARY [facility] - [facility]    PCP: none--previous patient of SHAHZAD Ospina, saw someone at [facility] primary care for a while, but didn't like them and came back to [facility].  Saw Dr. Martins for an ER f/u in May, but did not establish and wishing to establish with female provider.  Appt has been made for January with SHAHZAD Hurtado    CC: \"weak, shaky\"    Symptoms x 1 week.  Reports hx of vasovagal syncope, but has never lasted this long.  Denies fever or respiratory symptoms.  Does report dark urine with odor and also c/o ears itching.  Hx of thrombocytopenia and sees hematology at [facility], has order for labs and then f/u.  Hx of cirrhosis, but hasn't seen gastro in a long time for routine f/u.  Last labs were done in ER in May and reviewed today.  Believes she had labs done with PCP she was seeing at [facility] primary care, will sign for those today when she leaves.  Last TSH we have is from 2019.  Denies chest pain or dyspnea, hx of PVC's on previous EKG in May.  Some slight irregularity noted on exam today, will repeat EKG.  She declines further labs at this time.  C/O increased swelling in legs.  Taking HCTZ and had previously been given Lasix, but not currently taking. Will refill today.  Also c/o chronic incontinence that seems to be worsening in the last few months and would like to see someone for this as well.  Also requesting refill of Tramadol for arthritis pain as was given by SHAHZAD Ospina previously when she was PCP.  She was given #90 in March 2021 and down to 1 pill.  Only uses when ibuprofen doesn't help.  C/O bilateral shoulder pain, back pain and neck pain. Previous xrays have shown degenerative changes.  Denies any new injuries or falls.  Declines repeat xrays today.      Dizziness  This is a new problem. The current episode started " "in the past 7 days. The problem occurs daily. The problem has been waxing and waning. Associated symptoms include arthralgias, urinary symptoms (dark urine, odor, incontinence), vertigo, vomiting (x 1) and weakness. Pertinent negatives include no abdominal pain, anorexia, change in bowel habit, chest pain, chills, congestion, coughing, diaphoresis, fatigue, fever, headaches, joint swelling, myalgias, nausea, neck pain, numbness, rash, sore throat, swollen glands or visual change. Nothing aggravates the symptoms. She has tried rest for the symptoms. The treatment provided no relief.       Review of Systems   Constitutional: Negative for appetite change, chills, diaphoresis, fatigue and fever.   HENT: Negative for congestion, ear discharge, postnasal drip, rhinorrhea, sinus pressure, sinus pain, sneezing, sore throat and trouble swallowing. Ear pain:  pressure, itching.    Eyes: Negative.    Respiratory: Negative.  Negative for cough.    Cardiovascular: Positive for leg swelling ( seems to be worse than it used to be, taking HCTZ, but hasn't been taking Lasix). Negative for chest pain and palpitations.   Gastrointestinal: Positive for vomiting (x 1). Negative for abdominal pain, anorexia, change in bowel habit, constipation, diarrhea and nausea.   Genitourinary: Positive for frequency and urgency. Negative for dysuria.        +worsening incontinence  +dark urine, odor     Musculoskeletal: Positive for arthralgias. Negative for joint swelling, myalgias and neck pain.   Skin: Negative.  Negative for rash.   Neurological: Positive for dizziness, vertigo and weakness. Negative for numbness and headaches. Syncope:  has had a few new syncopal episodes in the past.        Objective   Vital Signs:   /86 (BP Location: Left arm, Patient Position: Sitting, Cuff Size: Large Adult)   Pulse 81   Temp 97.8 °F (36.6 °C) (Temporal)   Resp 20   Ht 170.2 cm (67\")   Wt 103 kg (228 lb 2 oz)   SpO2 98%   BMI 35.73 kg/m²     "   Physical Exam  Vitals and nursing note reviewed.   Constitutional:       General: She is not in acute distress.     Appearance: Normal appearance. She is obese. She is not ill-appearing.   HENT:      Head: Normocephalic and atraumatic.      Right Ear: Ear canal normal. A middle ear effusion is present. Tympanic membrane is erythematous.      Left Ear: Ear canal normal. A middle ear effusion is present. Tympanic membrane is erythematous.      Nose: Nose normal.      Right Sinus: No maxillary sinus tenderness or frontal sinus tenderness.      Left Sinus: No maxillary sinus tenderness or frontal sinus tenderness.      Mouth/Throat:      Mouth: Mucous membranes are moist.      Pharynx: Oropharynx is clear. Uvula midline. No pharyngeal swelling, oropharyngeal exudate or posterior oropharyngeal erythema.   Eyes:      General: No scleral icterus.        Right eye: No discharge.         Left eye: No discharge.      Conjunctiva/sclera: Conjunctivae normal.   Cardiovascular:      Rate and Rhythm: Normal rate.      Comments: A few skipped beats noted during exam, EKG shows NSR    Pulmonary:      Effort: Pulmonary effort is normal. No respiratory distress.      Breath sounds: Normal breath sounds. No wheezing, rhonchi or rales.   Abdominal:      General: Bowel sounds are normal.      Palpations: Abdomen is soft.      Tenderness: There is no abdominal tenderness. There is no right CVA tenderness, left CVA tenderness, guarding or rebound.   Musculoskeletal:      Cervical back: Neck supple. No tenderness.      Right lower leg: Edema ( 2+ pitting, mild aidan appearance of lower legs) present.      Left lower leg: Edema ( 2+ pitting, mild aidan appearance of lower leg) present.   Lymphadenopathy:      Cervical: No cervical adenopathy.   Skin:     General: Skin is warm and dry.   Neurological:      General: No focal deficit present.      Mental Status: She is alert and oriented to person, place, and time.   Psychiatric:          Mood and Affect: Mood normal.         Thought Content: Thought content normal.          Result Review :     Common labs    Common Labsle 5/6/21 5/6/21    0341 0341   Glucose  113 (A)   BUN  13   Creatinine  0.82   eGFR Non African Am  67   Sodium  137   Potassium  4.1   Chloride  108 (A)   Calcium  9.7   Albumin  3.10 (A)   Total Bilirubin  1.0   Alkaline Phosphatase  188 (A)   AST (SGOT)  88 (A)   ALT (SGPT)  32   WBC 3.77    Hemoglobin 10.7 (A)    Hematocrit 32.9 (A)    Platelets 107 (A)    (A) Abnormal value                       Recent Results (from the past 24 hour(s))   POCT urinalysis dipstick, automated    Collection Time: 12/09/21 11:32 AM    Specimen: Urine   Result Value Ref Range    Color Dark Yellow Yellow, Straw, Dark Yellow, Kellen    Clarity, UA Slightly Cloudy (A) Clear    Specific Gravity  1.025 1.005 - 1.030    pH, Urine 6.0 5.0 - 8.0    Leukocytes Trace (A) Negative    Nitrite, UA Positive (A) Negative    Protein, POC Negative Negative mg/dL    Glucose, UA Negative Negative, 1000 mg/dL (3+) mg/dL    Ketones, UA Negative Negative    Urobilinogen, UA Normal Normal    Bilirubin Negative Negative    Blood, UA Negative Negative    Lot Number [id number]     Expiration Date 01/08/23    ECG 12 Lead    Collection Time: 12/09/21 11:59 AM   Result Value Ref Range    QT Interval 422 ms    QTC Interval 468 ms   EKG: NSR, incomplete RBB, 74 bpm--previously noted on EKG in the past--official cardiology read pending    Assessment and Plan    Diagnoses and all orders for this visit:    1. Acute UTI (Primary)  -     Urine Culture - Urine, Urine, Clean Catch  -     cephalexin (Keflex) 500 MG capsule; Take 1 capsule by mouth 2 (Two) Times a Day.  Dispense: 20 capsule; Refill: 0    2. Dizziness  -     POCT urinalysis dipstick, automated  -     ECG 12 Lead    3. Weakness  -     POCT urinalysis dipstick, automated  -     ECG 12 Lead    4. Chronic arthritis  Comments:  neck, shoulders, back  Orders:  -     traMADol  (ULTRAM) 50 MG tablet; Take 1 tablet by mouth Every 8 (Eight) Hours As Needed for Moderate Pain  (arthritis pain).  Dispense: 30 tablet; Refill: 0    5. Bilateral otitis media, unspecified otitis media type  -     cephalexin (Keflex) 500 MG capsule; Take 1 capsule by mouth 2 (Two) Times a Day.  Dispense: 20 capsule; Refill: 0  -     levocetirizine (Xyzal) 5 MG tablet; Take 0.5 tablets by mouth Every Evening.  Dispense: 30 tablet; Refill: 2    6. Edema, unspecified type  -     furosemide (Lasix) 20 MG tablet; Take 1 tablet by mouth Daily.  Dispense: 30 tablet; Refill: 0    7. Cirrhosis of liver without ascites, unspecified hepatic cirrhosis type (HCC)  Comments:  referring back to Gastro  Orders:  -     Ambulatory Referral to Gastroenterology    8. Thrombocytopenia (HCC)  Comments:  Sees [facility] hematology    9. Urinary incontinence, unspecified type  Comments:  referral to urology  Orders:  -     Ambulatory Referral to Urology    Other orders  -     fluconazole (Diflucan) 150 MG tablet; Take 1 tablet by mouth 1 (One) Time As Needed (yeast from antibiotics) for up to 1 dose.  Dispense: 1 tablet; Refill: 0    Treatment for UTI and OM with Keflex today  Urine culture pending  Rx for Xyzal nightly to help with fluid on ears/itching  Declines any additional blood work today--will sign for records from [facility] before leaving today  Increase water.  Empty bladder frequently.   Would like referral to urology for evaluation of incontinence--referral placed    Refill of Tramadol PRN #30 for chronic pain/arthritis provided--Sudhir reece    Continue to see hematology as scheduled for thrombocytopenia manangement  Referral back to Gastro for cirrhosis management    Restart Lasix for edema--refill given    Return to [facility] Clinic PRN  Keep scheduled appt to establish with new PCP next month      This document has been electronically signed by SHAHZAD Davis on December 9, 2021 11:44 CST,.      I spent 54 minutes caring for  [place] on this date of service. This time includes time spent by me in the following activities:preparing for the visit, reviewing tests, performing a medically appropriate examination and/or evaluation , counseling and educating the patient/family/caregiver, ordering medications, tests, or procedures, referring and communicating with other health care professionals  and documenting information in the medical record

## 2021-12-10 LAB
QT INTERVAL: 422 MS
QTC INTERVAL: 468 MS

## 2021-12-16 LAB
BACTERIA UR CULT: ABNORMAL
BACTERIA UR CULT: ABNORMAL
OTHER ANTIBIOTIC SUSC ISLT: ABNORMAL

## 2022-01-10 ENCOUNTER — LAB (OUTPATIENT)
Dept: LAB | Facility: HOSPITAL | Age: 77
End: 2022-01-10

## 2022-01-10 ENCOUNTER — OFFICE VISIT (OUTPATIENT)
Dept: FAMILY MEDICINE CLINIC | Facility: CLINIC | Age: 77
End: 2022-01-10

## 2022-01-10 VITALS
HEIGHT: 67 IN | BODY MASS INDEX: 36.7 KG/M2 | WEIGHT: 233.8 LBS | DIASTOLIC BLOOD PRESSURE: 80 MMHG | TEMPERATURE: 97.3 F | SYSTOLIC BLOOD PRESSURE: 160 MMHG | HEART RATE: 81 BPM | OXYGEN SATURATION: 98 %

## 2022-01-10 DIAGNOSIS — M19.90 CHRONIC ARTHRITIS: ICD-10-CM

## 2022-01-10 DIAGNOSIS — R11.0 NAUSEA: ICD-10-CM

## 2022-01-10 DIAGNOSIS — D69.6 THROMBOCYTOPENIA: ICD-10-CM

## 2022-01-10 DIAGNOSIS — K74.60 CIRRHOSIS OF LIVER WITHOUT ASCITES, UNSPECIFIED HEPATIC CIRRHOSIS TYPE: ICD-10-CM

## 2022-01-10 DIAGNOSIS — R60.9 EDEMA, UNSPECIFIED TYPE: ICD-10-CM

## 2022-01-10 DIAGNOSIS — E55.9 VITAMIN D DEFICIENCY: ICD-10-CM

## 2022-01-10 DIAGNOSIS — E03.9 HYPOTHYROIDISM, UNSPECIFIED TYPE: ICD-10-CM

## 2022-01-10 DIAGNOSIS — Z76.89 ENCOUNTER TO ESTABLISH CARE: Primary | ICD-10-CM

## 2022-01-10 DIAGNOSIS — Z79.899 ENCOUNTER FOR LONG-TERM CURRENT USE OF MEDICATION: ICD-10-CM

## 2022-01-10 DIAGNOSIS — D64.9 LOW HEMOGLOBIN: ICD-10-CM

## 2022-01-10 DIAGNOSIS — Z13.220 LIPID SCREENING: ICD-10-CM

## 2022-01-10 DIAGNOSIS — E66.01 CLASS 2 SEVERE OBESITY WITH SERIOUS COMORBIDITY AND BODY MASS INDEX (BMI) OF 36.0 TO 36.9 IN ADULT, UNSPECIFIED OBESITY TYPE: ICD-10-CM

## 2022-01-10 DIAGNOSIS — R30.0 DYSURIA: ICD-10-CM

## 2022-01-10 PROBLEM — M25.569 KNEE PAIN: Status: ACTIVE | Noted: 2022-01-10

## 2022-01-10 LAB
AMPHET+METHAMPHET UR QL: NEGATIVE
AMPHETAMINES UR QL: NEGATIVE
BARBITURATES UR QL SCN: NEGATIVE
BENZODIAZ UR QL SCN: NEGATIVE
BILIRUB BLD-MCNC: NEGATIVE MG/DL
BUPRENORPHINE SERPL-MCNC: NEGATIVE NG/ML
CANNABINOIDS SERPL QL: NEGATIVE
CLARITY, POC: CLEAR
COCAINE UR QL: NEGATIVE
COLOR UR: ABNORMAL
EXPIRATION DATE: ABNORMAL
GLUCOSE UR STRIP-MCNC: NEGATIVE MG/DL
KETONES UR QL: NEGATIVE
LEUKOCYTE EST, POC: NEGATIVE
Lab: ABNORMAL
METHADONE UR QL SCN: NEGATIVE
NITRITE UR-MCNC: NEGATIVE MG/ML
OPIATES UR QL: NEGATIVE
OXYCODONE UR QL SCN: NEGATIVE
PCP UR QL SCN: NEGATIVE
PH UR: 6 [PH] (ref 5–8)
PROPOXYPH UR QL: NEGATIVE
PROT UR STRIP-MCNC: NEGATIVE MG/DL
RBC # UR STRIP: NEGATIVE /UL
SP GR UR: 1.02 (ref 1–1.03)
TRICYCLICS UR QL SCN: NEGATIVE
UROBILINOGEN UR QL: NORMAL

## 2022-01-10 PROCEDURE — 80307 DRUG TEST PRSMV CHEM ANLYZR: CPT

## 2022-01-10 PROCEDURE — 87086 URINE CULTURE/COLONY COUNT: CPT | Performed by: NURSE PRACTITIONER

## 2022-01-10 PROCEDURE — 99215 OFFICE O/P EST HI 40 MIN: CPT | Performed by: NURSE PRACTITIONER

## 2022-01-10 PROCEDURE — 81003 URINALYSIS AUTO W/O SCOPE: CPT | Performed by: NURSE PRACTITIONER

## 2022-01-10 PROCEDURE — 80306 DRUG TEST PRSMV INSTRMNT: CPT

## 2022-01-10 RX ORDER — ONDANSETRON 4 MG/1
4 TABLET, FILM COATED ORAL EVERY 8 HOURS PRN
Qty: 90 TABLET | Refills: 1 | Status: SHIPPED | OUTPATIENT
Start: 2022-01-10

## 2022-01-10 RX ORDER — LEVOTHYROXINE SODIUM 0.1 MG/1
100 TABLET ORAL DAILY
Qty: 90 TABLET | Refills: 1 | Status: SHIPPED | OUTPATIENT
Start: 2022-01-10

## 2022-01-10 RX ORDER — HYDROCHLOROTHIAZIDE 25 MG/1
25 TABLET ORAL DAILY
Qty: 90 TABLET | Refills: 1 | Status: SHIPPED | OUTPATIENT
Start: 2022-01-10 | End: 2023-01-12

## 2022-01-10 RX ORDER — LORATADINE 10 MG/1
10 CAPSULE, LIQUID FILLED ORAL
COMMUNITY
End: 2023-01-12

## 2022-01-10 NOTE — PROGRESS NOTES
"Chief Complaint  Hypothyroidism (Est Care), Urinary Frequency (recently tx for UTI), Edema, Heartburn, and Pain    Subjective    History of Present Illness {CC  Problem List  Visit  Diagnosis   Encounters  Notes  Medications  Labs  Result Review Imaging  Media :23}     Pita Hyatt presents to [facility] PRIMARY CARE - [place] for   Establish care visit and f/u for hypothyroid, edema, chronic pain from arthritis and hepatic issues. Previous pt of Warren VELASQUEZ - retired, then pt switched to Dr. Martins whom she states she did not care for so she switched care to Dr. Lily Kirkpatrick - last seen by Dr. Kirkpatrick on 10/4/21 labs from 8/30/21 reviewed: WBC 3.4; H/H 9.9/30.6; platelet 88; TSH 0.41; alkphos 143; AST 70 - review of records indicates pt was referred to Dr. Stephanie Pride - [facility] Hem/Onc but pt states she only saw once and did not go back - states Dr. Pride wanted her to get additional labs and pt states she is a \"hard stick\" and does not want to get labs performed - also mentioned she does not want to use our lab here at the clinic d/t having problems with having labs drawn some time ago - states she did file a complaint about the lab event. Review of records also indicates pt was referred to sleep medicine by Dr. Martins d/t nocturia, snoring and fatigue - pt did not f/u with sleep study or return visit with sleep medicine. 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 Short. Reports her \"ears feel itchy\" and wants to make sure her UTI has resolved.        Objective     Physical Exam  Vitals and nursing note reviewed.   Constitutional:       General: She is not in acute distress.     Appearance: Normal appearance. She is obese. She is not ill-appearing.   HENT:      Head: Normocephalic and atraumatic.      Right Ear: Tympanic membrane, ear canal and external ear normal.      Left Ear: Tympanic " membrane, ear canal and external ear normal.      Nose: Nose normal.      Mouth/Throat:      Mouth: Mucous membranes are moist.      Pharynx: Oropharynx is clear. No posterior oropharyngeal erythema.      Comments: Smooth strawberry tongue  Eyes:      Conjunctiva/sclera: Conjunctivae normal.      Comments: Bilat Exophthalmos; R eye impaired   Neck:      Vascular: No carotid bruit.   Cardiovascular:      Rate and Rhythm: Normal rate and regular rhythm.      Heart sounds: Murmur (pansystolic) heard.       Pulmonary:      Effort: Pulmonary effort is normal.      Breath sounds: Normal breath sounds. No wheezing or rhonchi.   Abdominal:      Tenderness: There is no abdominal tenderness. There is no right CVA tenderness or left CVA tenderness.   Musculoskeletal:         General: Normal range of motion.      Cervical back: Normal range of motion and neck supple.   Lymphadenopathy:      Cervical: No cervical adenopathy.   Skin:     General: Skin is warm and dry.   Neurological:      General: No focal deficit present.      Mental Status: She is alert and oriented to person, place, and time.   Psychiatric:         Mood and Affect: Mood normal.         Behavior: Behavior normal.        Result Review  Data Reviewed:{ Labs  Result Review  Imaging  Med Tab  Media :23}   The following data was reviewed by (Optional):[id number]}  Common labs    Common Labsle 5/6/21 5/6/21    0341 0341   Glucose  113 (A)   BUN  13   Creatinine  0.82   eGFR Non African Am  67   Sodium  137   Potassium  4.1   Chloride  108 (A)   Calcium  9.7   Albumin  3.10 (A)   Total Bilirubin  1.0   Alkaline Phosphatase  188 (A)   AST (SGOT)  88 (A)   ALT (SGPT)  32   WBC 3.77    Hemoglobin 10.7 (A)    Hematocrit 32.9 (A)    Platelets 107 (A)    (A) Abnormal value          No Images in the past 120 days found..  Brief Urine Lab Results  (Last result in the past 365 days)      Color   Clarity   Blood   Leuk Est   Nitrite   Protein   CREAT   Urine HCG        01/10/22  "1539 Orange   Clear   Negative   Negative   Negative   Negative                    Vital Signs:   /80 (BP Location: Left arm, Patient Position: Sitting, Cuff Size: Adult)   Pulse 81   Temp 97.3 °F (36.3 °C) (Temporal)   Ht 170.2 cm (67\")   Wt 106 kg (233 lb 12.8 oz)   SpO2 98%   BMI 36.62 kg/m²          Assessment and Plan {CC Problem List  Visit Diagnosis  ROS  Review (Popup)  Health Maintenance  Quality  BestPractice  Medications  SmartSets  SnapShot Encounters  Media :23}   Problem List Items Addressed This Visit        Coag and Thromboembolic    Thrombocytopenia (HCC)    Relevant Orders    CBC & Differential    Ambulatory Referral to Hematology       Endocrine and Metabolic    Vitamin D deficiency    Relevant Orders    Vitamin D 25 hydroxy    Hypothyroidism    Relevant Medications    levothyroxine (SYNTHROID, LEVOTHROID) 100 MCG tablet    Other Relevant Orders    TSH    Obesity (BMI 30.0-34.9)       Gastrointestinal Abdominal     Cirrhosis of liver without ascites (HCC)    Overview     Added automatically from request for surgery [id number]         Relevant Orders    Comprehensive metabolic panel       Health Encounters    Encounter for long-term current use of medication    Relevant Orders    Hemoglobin A1c    Urine Drug Screen - Urine, Clean Catch    Tramadol, Urine - Urine, Clean Catch       Hematology and Neoplasia    Low hemoglobin    Relevant Orders    POCT hemoglobin    CBC & Differential    Ambulatory Referral to Hematology       Musculoskeletal and Injuries    Chronic arthritis    Relevant Orders    Urine Drug Screen - Urine, Clean Catch    Tramadol, Urine - Urine, Clean Catch       Symptoms and Signs    Edema    Relevant Medications    hydroCHLOROthiazide (HYDRODIURIL) 25 MG tablet      Other Visit Diagnoses     Encounter to establish care    -  Primary    Nausea        Relevant Medications    ondansetron (Zofran) 4 MG tablet    Dysuria        Relevant Orders    POCT urinalysis " dipstick, automated (Completed)    Urine Culture - Urine, Urine, Clean Catch    Lipid screening        Relevant Orders    Lipid Panel         Diagnosis Plan   1. Encounter to establish care     2. Hypothyroidism, unspecified type  levothyroxine (SYNTHROID, LEVOTHROID) 100 MCG tablet    TSH   3. Edema, unspecified type  hydroCHLOROthiazide (HYDRODIURIL) 25 MG tablet   4. Chronic arthritis  Urine Drug Screen - Urine, Clean Catch    Tramadol, Urine - Urine, Clean Catch   5. Nausea  ondansetron (Zofran) 4 MG tablet   6. Low hemoglobin  POCT hemoglobin    CBC & Differential    Ambulatory Referral to Hematology   7. Thrombocytopenia (HCC)  CBC & Differential    Ambulatory Referral to Hematology   8. Cirrhosis of liver without ascites, unspecified hepatic cirrhosis type (HCC)  Comprehensive metabolic panel   9. Dysuria  POCT urinalysis dipstick, automated    Urine Culture - Urine, Urine, Clean Catch    Urine Culture - Urine, Urine, Clean Catch   10. Vitamin D deficiency  Vitamin D 25 hydroxy   11. Lipid screening  Lipid Panel   12. Encounter for long-term current use of medication  Hemoglobin A1c    Urine Drug Screen - Urine, Clean Catch    Tramadol, Urine - Urine, Clean Catch   13. Class 2 severe obesity with serious comorbidity and body mass index (BMI) of 36.0 to 36.9 in adult, unspecified obesity type (HCC)       - Est Care visit - review of previous visits and labs noted in HPI  - Hypothyroidism - Pt reports off meds for approx 2 weeks - refill levothyroxine at current dosing - RX submitted - TSH ordered  - Bilat edema - chart records indicates dx of lymphedema - this most likely will not respond to diuretics and needs compression - chart notes from Dr. Kirkpatrick indicate pt refused referral to lymphedema clinic in [facility].   - chronic arthritis - review of previous imaging indicates DDD mod- severe of thoracic spine and deg changes of right shoulder. Uses NSAIDs sparingly and tramadol sparingly for pain mostly at night  - will obtain UDS and tramadol urine - will refill tramadol pending result.   - Nausea - most likely r/t liver disease - refill ondansetron submitted  - Low hemoglobin and thrombocytopenia - referral submitted to Hem/Onc  - CT confirmed cirrhosis - repeat CMP - will encourage f/u with GI for liver mgmt  - POCT UA overall normal - will send for cx for confirmation of no bacterial growth.   - Pt hesitant regarding obtaining labs d/t previous experiences - pt initially stated she did not want to get any labs going forward; discussed at length the importance of obtaining labs to properly treat her chronic health issues - pt agreeable and wants to transfer hem/onc mgmt to [place] and have labs drawn there. Labs ordered  - Body mass index is 36.62 kg/m². BMI is considered obese - advised healthy diet and exercise as tolerated - some weight may be r/t fluid retention.  - RTC 6 mos for f/u or PRN  - AWV due 9/6/2022    I spent 40 minutes caring for Pita Hyatt on this date of service. This time includes time spent by me in the following activities: preparing for the visit, reviewing tests, obtaining and/or reviewing a separately obtained history, performing a medically appropriate examination and/or evaluation, counseling and educating the patient/family/caregiver, ordering medications, tests, or procedures, referring and communicating with other health care professionals, documenting information in the medical record, independently interpreting results and communicating that information with the patient/family/caregiver and care coordination.     Follow Up {Instructions Charge Capture  Follow-up Communications :23}   Return in about 6 months (around 7/10/2022).  Patient was given instructions and counseling regarding her condition or for health maintenance advice. Please see specific information pulled into the AVS if appropriate            .  This document has been electronically signed by Victoria Kingston,  APRN on January 10, 2022 16:05 CST

## 2022-01-11 LAB — BACTERIA SPEC AEROBE CULT: NORMAL

## 2022-01-24 ENCOUNTER — APPOINTMENT (OUTPATIENT)
Dept: ONCOLOGY | Facility: CLINIC | Age: 77
End: 2022-01-24

## 2022-02-04 ENCOUNTER — APPOINTMENT (OUTPATIENT)
Dept: ONCOLOGY | Facility: HOSPITAL | Age: 77
End: 2022-02-04

## 2022-02-04 ENCOUNTER — APPOINTMENT (OUTPATIENT)
Dept: ONCOLOGY | Facility: CLINIC | Age: 77
End: 2022-02-04

## 2022-02-06 DIAGNOSIS — R60.9 EDEMA, UNSPECIFIED TYPE: Chronic | ICD-10-CM

## 2022-02-07 RX ORDER — FUROSEMIDE 20 MG/1
TABLET ORAL
Qty: 30 TABLET | Refills: 0 | OUTPATIENT
Start: 2022-02-07

## 2022-02-07 NOTE — TELEPHONE ENCOUNTER
Rx Refill Note  Requested Prescriptions     Refused Prescriptions Disp Refills   • furosemide (LASIX) 20 MG tablet [Pharmacy Med Name: FUROSEMIDE 20 MG TABLET] 30 tablet 0     Sig: TAKE ONE TABLET BY MOUTH DAILY      Last office visit with prescribing clinician: 12/9/2021      Next office visit with prescribing clinician: Visit date not found   {TIP  Encounters:    D/CD BY PROVIDER 1/10/22         Colton Maharaj LPN  02/07/22, 09:38 CST

## 2022-07-07 ENCOUNTER — OFFICE VISIT (OUTPATIENT)
Dept: CARDIOLOGY | Facility: CLINIC | Age: 77
End: 2022-07-07

## 2022-07-07 VITALS
OXYGEN SATURATION: 98 % | DIASTOLIC BLOOD PRESSURE: 76 MMHG | TEMPERATURE: 97.1 F | SYSTOLIC BLOOD PRESSURE: 128 MMHG | WEIGHT: 202 LBS | HEART RATE: 82 BPM | BODY MASS INDEX: 31.71 KG/M2 | HEIGHT: 67 IN

## 2022-07-07 DIAGNOSIS — I50.9 CONGESTIVE HEART FAILURE, UNSPECIFIED HF CHRONICITY, UNSPECIFIED HEART FAILURE TYPE: Primary | ICD-10-CM

## 2022-07-07 DIAGNOSIS — R60.9 EDEMA, UNSPECIFIED TYPE: ICD-10-CM

## 2022-07-07 DIAGNOSIS — K74.60 CIRRHOSIS OF LIVER WITHOUT ASCITES, UNSPECIFIED HEPATIC CIRRHOSIS TYPE: ICD-10-CM

## 2022-07-07 DIAGNOSIS — E66.01 MORBIDLY OBESE: ICD-10-CM

## 2022-07-07 DIAGNOSIS — E66.9 OBESITY (BMI 30.0-34.9): ICD-10-CM

## 2022-07-07 DIAGNOSIS — R01.1 HEART MURMUR: ICD-10-CM

## 2022-07-07 DIAGNOSIS — R60.1 GENERALIZED EDEMA: ICD-10-CM

## 2022-07-07 DIAGNOSIS — D69.6 THROMBOCYTOPENIA: ICD-10-CM

## 2022-07-07 PROCEDURE — 93000 ELECTROCARDIOGRAM COMPLETE: CPT | Performed by: INTERNAL MEDICINE

## 2022-07-07 PROCEDURE — 99215 OFFICE O/P EST HI 40 MIN: CPT | Performed by: INTERNAL MEDICINE

## 2022-07-07 RX ORDER — CARVEDILOL 3.12 MG/1
3.12 TABLET ORAL
COMMUNITY
Start: 2022-05-17 | End: 2023-01-12

## 2022-07-07 RX ORDER — CETIRIZINE HYDROCHLORIDE 10 MG/1
1 TABLET ORAL DAILY
COMMUNITY

## 2022-07-07 RX ORDER — FOLIC ACID/MULTIVIT,IRON,MINER .4-18-35
1 TABLET,CHEWABLE ORAL DAILY
COMMUNITY
Start: 2022-05-18

## 2022-07-07 RX ORDER — ASPIRIN 81 MG/1
81 TABLET, CHEWABLE ORAL
COMMUNITY
Start: 2022-05-18 | End: 2023-01-12

## 2022-07-07 RX ORDER — FLUTICASONE PROPIONATE 50 MCG
1 SPRAY, SUSPENSION (ML) NASAL
COMMUNITY

## 2022-07-07 RX ORDER — ACETAMINOPHEN 325 MG/1
325 TABLET ORAL
COMMUNITY
Start: 2022-05-17 | End: 2023-01-12

## 2022-07-07 RX ORDER — POTASSIUM CHLORIDE 1500 MG/1
TABLET, EXTENDED RELEASE ORAL
COMMUNITY
Start: 2022-06-09 | End: 2023-01-12

## 2022-07-07 RX ORDER — CEPHALEXIN 500 MG/1
500 CAPSULE ORAL
COMMUNITY
Start: 2022-05-17 | End: 2023-01-12

## 2022-07-07 RX ORDER — DOXYCYCLINE HYCLATE 50 MG/1
324 CAPSULE, GELATIN COATED ORAL
COMMUNITY
Start: 2022-05-17 | End: 2023-01-12

## 2022-07-07 RX ORDER — SACCHAROMYCES BOULARDII 250 MG
250 CAPSULE ORAL
COMMUNITY
Start: 2022-05-17 | End: 2023-01-12

## 2022-07-07 RX ORDER — FUROSEMIDE 40 MG/1
TABLET ORAL
COMMUNITY
Start: 2022-06-09

## 2022-07-07 NOTE — PROGRESS NOTES
Pita Hyatt  77 y.o. female    07/07/2022  1. Congestive heart failure, unspecified HF chronicity, unspecified heart failure type (HCC)    2. Edema, unspecified type    3. Cirrhosis of liver without ascites, unspecified hepatic cirrhosis type (HCC)    4. Morbidly obese (HCC)    5. Obesity (BMI 30.0-34.9)    6. Heart murmur    7. Thrombocytopenia (HCC)    8. Generalized edema      #2 mentioned above is an error    History of Present Illness  Pita Hyatt is a 77-year-old female who is referred by Dr. Peoples for evaluation of suspected congestive heart failure.  In May 2022 the patient apparently visited [place] for graduation of her granddaughter and developed a urinary tract infection and mental status changes for which she was hospitalized.  She was treated with antibiotics and intravenous fluids.  She was discharged in stable condition and after she returned home she was noted to have significant generalized edema in both the upper and lower extremities and was apparently started on furosemide which significantly improved her symptoms.  The patient denied any chest pain, shortness of breath, palpitation and repeatedly told me that she believes that her heart was all right.  She has documented cirrhosis of the liver of unknown etiology for several decades, abnormal LFTs, thrombocytopenia, splenomegaly, history of recurrent vasovagal syncope going back several decades, gastroesophageal reflux, stress incontinence, hypothyroidism, DJD.  She has no history of hepatitis.    On review of her records, she has been evaluated by Dr. Aparicio in 2019 for atypical chest pain and dobutamine stress echocardiogram showed no wall motion abnormalities.  She was noted to have a systolic murmur back then and echocardiogram did not reveal any significant valve abnormalities.  Transthoracic echocardiogram in August 2019 showed ejection fraction of 62% with mild LVH and normal diastolic function.  There was borderline dilated  right ventricle.  No significant valve abnormalities were noted.    EKG today showed sinus rhythm with heart rate of 82 bpm.  No ST-T wave changes diagnostic of ischemia.  Minimal nonspecific ST-T changes.      SUBJECTIVE    Allergies   Allergen Reactions   • Pneumococcal Vaccines Swelling   • Phenergan [Promethazine] Itching         Past Medical History:   Diagnosis Date   • Acquired hypothyroidism    • Acute bronchitis    • Acute gastritis    • Acute maxillary sinusitis    • Acute maxillary sinusitis, unspecified    • Acute serous otitis media     R   • Acute sinusitis     Could be R mastoid    • Allergic rhinitis    • Anxiety state    • Chronic sinusitis    • Conjunctivitis    • Cough    • Degenerative joint disease of shoulder region    • Depressive disorder     improving   • Diarrhea    • Dysfunction of eustachian tube    • Epigastric pain    • Female stress incontinence    • Forgetfulness    • Functional diarrhea    • Gynecological Examination    • Knee joint replaced by other means    • Malaise and fatigue    • Menopause    • Nausea and vomiting    • Other Chest pain    • Polyp of colon    • Preop examination, unspecified    • Rhinitis medicamentosa    • Shoulder pain    • Skin tag- removal    • Subclinical hypothyroidism    • Vagal reaction          Past Surgical History:   Procedure Laterality Date   • COLONOSCOPY  07/16/2012   • COLONOSCOPY  01/13/2017   • COLONOSCOPY N/A 7/14/2020    Procedure: COLONOSCOPY;  Surgeon: Linda Joseph MD;  Location: [facility] ENDOSCOPY;  Service: Gastroenterology;  Laterality: N/A;   • ENDOSCOPY N/A 7/14/2020    Procedure: ESOPHAGOGASTRODUODENOSCOPY;  Surgeon: Linda Joseph MD;  Location: [facility] ENDOSCOPY;  Service: Gastroenterology;  Laterality: N/A;   • ENDOSCOPY AND COLONOSCOPY      Diverticulum in sigmoid colon. 2 polyps in ascending colon & descending colon; removed by snare cautery polypectomy   • HYSTERECTOMY     • INJECTION OF MEDICATION      celestone  (betamethasone) 12mg   • INJECTION OF MEDICATION      Depo Medrol 2ml   • INJECTION OF MEDICATION      Rocephin 1gm   • JOINT REPLACEMENT Bilateral 2015    basim total knee replacements   • REPLACEMENT TOTAL KNEE     • SINOSCOPY PROCEDURE Bilateral 2018    Procedure: BILATERAL ENDOSCOPIC EHTMOIDECTOMY, MAXILLARY SINUS ANTROSTOMY, FRONTAL RECESS EXPLORATION, POSSIBLE SEPTOPLASY         BRAINLAB;  Surgeon: Daniel Griffith MD;  Location: [facility] [place];  Service: ENT   • TOTAL ABDOMINAL HYSTERECTOMY WITH SALPINGO OOPHORECTOMY     • UPPER GASTROINTESTINAL ENDOSCOPY  2012   • UPPER GASTROINTESTINAL ENDOSCOPY  2017         Family History   Problem Relation Age of Onset   • Hypertension Other    • Stroke Other    • Diabetes Other    • Ulcers Other    • Cancer Other    • Stroke Father    • Sinusitis Mother          Social History     Socioeconomic History   • Marital status:    Tobacco Use   • Smoking status: Former Smoker     Quit date:      Years since quittin.6   • Smokeless tobacco: Never Used   Substance and Sexual Activity   • Alcohol use: Not Currently     Comment: occasional   • Drug use: No   • Sexual activity: Defer     Birth control/protection: Surgical     Comment: Hysterectomy         Current Outpatient Medications   Medication Sig Dispense Refill   • cycloSPORINE (RESTASIS) 0.05 % ophthalmic emulsion Apply 1 drop to eye(s) as directed by provider Every 12 (Twelve) Hours. 5.5 mL 5   • furosemide (LASIX) 40 MG tablet      • hydroCHLOROthiazide (HYDRODIURIL) 25 MG tablet Take 1 tablet by mouth Daily. 90 tablet 1   • levothyroxine (SYNTHROID, LEVOTHROID) 100 MCG tablet Take 1 tablet by mouth Daily. 90 tablet 1   • Loratadine 10 MG capsule Take 10 mg by mouth.     • omeprazole (priLOSEC) 20 MG capsule Take 1 capsule by mouth Daily. 90 capsule 1   • ondansetron (Zofran) 4 MG tablet Take 1 tablet by mouth Every 8 (Eight) Hours As Needed for Nausea or Vomiting. 90 tablet 1  "  • acetaminophen (TYLENOL) 325 MG tablet Take 325 mg by mouth.     • aspirin 81 MG chewable tablet Chew 81 mg.     • carvedilol (COREG) 3.125 MG tablet Take 3.125 mg by mouth.     • cephalexin (KEFLEX) 500 MG capsule Take 500 mg by mouth.     • cetirizine (zyrTEC) 10 MG tablet Take 1 tablet by mouth Daily.     • Cyanocobalamin (VITAMIN B-12 PO) Take  by mouth.     • ferrous gluconate (FERGON) 324 MG tablet Take 324 mg by mouth.     • fluticasone (FLONASE) 50 MCG/ACT nasal spray 1 spray into the nostril(s) as directed by provider.     • KLOR-CON 20 MEQ CR tablet      • multivitamin-iron-minerals-folic acid (CENTRUM) chewable tablet Chew 1 tablet Daily.     • saccharomyces boulardii (FLORASTOR) 250 MG capsule Take 250 mg by mouth.     • traMADol (ULTRAM) 50 MG tablet Take 1 tablet by mouth Every 8 (Eight) Hours As Needed for Moderate Pain  (arthritis pain). 30 tablet 0   • triamcinolone (KENALOG) 0.1 % ointment      • vitamin D (ERGOCALCIFEROL) 1.25 MG (99191 UT) capsule capsule Take 1 capsule by mouth 1 (One) Time Per Week. 12 capsule 1     No current facility-administered medications for this visit.         OBJECTIVE    /76 (BP Location: Left arm, Patient Position: Sitting, Cuff Size: Adult)   Pulse 82   Temp 97.1 °F (36.2 °C)   Ht 170.2 cm (67\")   Wt 91.6 kg (202 lb)   LMP  (LMP Unknown)   SpO2 98%   BMI 31.64 kg/m²         Review of Systems     Constitutional:  Denies recent weight loss, weight gain, fever or chills     HENT:  Denies any hearing loss, epistaxis, hoarseness, or difficulty speaking.     Eyes: Wears eyeglasses or contact lenses     Respiratory:  Denies dyspnea with exertion, no cough, wheezing, or hemoptysis.     Cardiovascular: See HPI    Gastrointestinal: History of cirrhosis of the liver.  See HPI    Endocrine: Negative for cold intolerance, heat intolerance, polydipsia, polyphagia and polyuria.     Genitourinary: Negative.      Musculoskeletal: Denies any history of arthritic " symptoms or back problems     Neurological:  Denies any history of recurrent headaches, strokes, TIA, or seizure disorder.     Hematological: History of anemia, thrombocytopenia    Psychiatric/Behavioral: Denies any history of depression, substance abuse, or change in cognitive function.         Physical Exam     Constitutional: Cooperative, alert and oriented, in no acute distress.     HENT:   Head: Normocephalic, normal hair patterns, no masses or tenderness.  Ears, Nose, and Throat: No gross abnormalities. No pallor or cyanosis.   Eyes: EOMS intact, PERRL, conjunctivae and lids unremarkable. Fundoscopic exam and visual fields not performed.   Neck: No palpable masses or adenopathy, no thyromegaly, no JVD, carotid pulses are full and equal bilaterally and without bruit.     Cardiovascular: Regular rhythm, S1 and S2 normal, no S3 or S4. 2/6 systolic murmur, no gallops, or rubs detected.     Pulmonary/Chest: Chest: normal symmetry, normal respiratory excursion, no intercostal retraction, no use of accessory muscles.     Pulmonary: Normal breath sounds. No rales or rhonchi.    Abdominal: Abdomen soft, mild distention, bowel sounds normoactive    Musculoskeletal: No deformities, clubbing, cyanosis, erythema,  2+ edema lower extremities    Neurological: No gross motor or sensory deficits noted, affect appropriate, oriented to time, person, place.     Skin: Warm and dry to the touch, no apparent skin lesion or mass noted.     Psychiatric: She has a normal mood and affect. Her behavior is normal. Judgment and thought content normal.         Procedures      Lab Results   Component Value Date    WBC 3.77 05/06/2021    HGB 10.7 (L) 05/06/2021    HCT 32.9 (L) 05/06/2021    MCV 91.6 05/06/2021     (L) 05/06/2021     Lab Results   Component Value Date    GLUCOSE 113 (H) 05/06/2021    BUN 13 05/06/2021    CREATININE 0.82 05/06/2021    EGFRIFNONA 67 05/06/2021    BCR 15.9 05/06/2021    CO2 22.0 05/06/2021    CALCIUM 9.7  05/06/2021    ALBUMIN 3.10 (L) 05/06/2021    AST 88 (H) 05/06/2021    ALT 32 05/06/2021     No results found for: CHOL  Lab Results   Component Value Date    TRIG 116 04/11/2017     No results found for: HDL  No components found for: LDLCALC  No results found for: LDL  No results found for: HDLLDLRATIO  No components found for: CHOLHDL  Lab Results   Component Value Date    HGBA1C 5.30 08/06/2019     Lab Results   Component Value Date    TSH 3.700 08/06/2019           ASSESSMENT AND PLAN  Pita Hyatt is a 77-year-old female with cirrhosis of the liver of unknown etiology for several decades, abnormal LFTs, thrombocytopenia, splenomegaly, history of recurrent vasovagal syncope going back several decades, gastroesophageal reflux, stress incontinence, hypothyroidism, DJD, who is being evaluated for increasing edema as discussed in detail in the history of present illness.  I suspect that her edema is most likely related to her liver disease, hypoalbuminemia rather than congestive heart failure.  She does have a heart murmur which is most likely due to aortic valve sclerosis with no significant stenosis and unlikely to be hemodynamically significant.  She has no cardiac symptoms and suspicion for ongoing ischemia is low.    Her labs from 5/31/2022 were reviewed and BUN was 17 and creatinine 1.84.  Electrolytes were in the normal range and albumin was low at 2.6 AST was 49 and ALT 16.  CBC showed a white cell count of 4.5, hemoglobin of 10.2, hematocrit of 31.7 and platelet count of 87.  Lower extremity arterial duplex in September 2021 showed no significant plaque/stenosis.  Carotid Doppler studies in October 2021 showed no hemodynamically significant stenosis.    Plan would be to check a BNP to see if there is abnormal and an echocardiogram to assess left ventricular and valvular function has been arranged.  No changes in medications have been made and current dose of Lasix, HCTZ, aspirin, carvedilol have been  continued.  If she has recurrence of edema or increasing edema, we could consider spironolactone in the future.    Thank you for asked me to see this patient.    50 minutes was spent in the assessment and evaluation of this patient    Diagnoses and all orders for this visit:    1. Congestive heart failure, unspecified HF chronicity, unspecified heart failure type (HCC) (Primary)  -     ECG 12 Lead  -     BNP  -     Adult Transthoracic Echo Complete w/ Color, Spectral and Contrast if Necessary Per Protocol; Future    2. Edema, unspecified type  -     BNP  -     Adult Transthoracic Echo Complete w/ Color, Spectral and Contrast if Necessary Per Protocol; Future    3. Cirrhosis of liver without ascites, unspecified hepatic cirrhosis type (HCC)  -     Adult Transthoracic Echo Complete w/ Color, Spectral and Contrast if Necessary Per Protocol; Future    4. Morbidly obese (HCC)    5. Obesity (BMI 30.0-34.9)    6. Heart murmur    7. Thrombocytopenia (HCC)    8. Generalized edema        BMI is >= 30 and <35. (Class 1 Obesity). The following options were offered after discussion;: nutrition counseling/recommendations      Pita Hyatt  reports that she quit smoking about 42 years ago. She has never used smokeless tobacco.        Saranya Perry MD  8/24/2022  19:16 CDT

## 2022-07-08 LAB
QT INTERVAL: 384 MS
QTC INTERVAL: 448 MS

## 2022-07-26 ENCOUNTER — OFFICE VISIT (OUTPATIENT)
Dept: GASTROENTEROLOGY | Facility: CLINIC | Age: 77
End: 2022-07-26

## 2022-07-26 VITALS
HEIGHT: 67 IN | SYSTOLIC BLOOD PRESSURE: 107 MMHG | WEIGHT: 209 LBS | HEART RATE: 75 BPM | DIASTOLIC BLOOD PRESSURE: 50 MMHG | BODY MASS INDEX: 32.8 KG/M2

## 2022-07-26 DIAGNOSIS — D64.9 ACUTE ANEMIA: ICD-10-CM

## 2022-07-26 DIAGNOSIS — K74.60 CIRRHOSIS OF LIVER WITHOUT ASCITES, UNSPECIFIED HEPATIC CIRRHOSIS TYPE: Primary | ICD-10-CM

## 2022-07-26 DIAGNOSIS — R79.9 ABNORMAL FINDING OF BLOOD CHEMISTRY, UNSPECIFIED: ICD-10-CM

## 2022-07-26 DIAGNOSIS — R74.8 ELEVATED LIVER ENZYMES: ICD-10-CM

## 2022-07-26 DIAGNOSIS — E71.30 DISORDER OF FATTY-ACID METABOLISM, UNSPECIFIED: ICD-10-CM

## 2022-07-26 PROCEDURE — 99214 OFFICE O/P EST MOD 30 MIN: CPT | Performed by: PHYSICIAN ASSISTANT

## 2022-08-24 PROBLEM — H04.123 DRY EYES, BILATERAL: Status: RESOLVED | Noted: 2017-07-18 | Resolved: 2022-08-24

## 2022-08-24 PROBLEM — K21.9 GASTROESOPHAGEAL REFLUX DISEASE: Status: RESOLVED | Noted: 2021-05-14 | Resolved: 2022-08-24

## 2022-08-24 PROBLEM — R60.0 EDEMA OF EXTREMITIES: Status: RESOLVED | Noted: 2018-09-15 | Resolved: 2022-08-24

## 2022-08-24 PROBLEM — R60.1 GENERALIZED EDEMA: Status: ACTIVE | Noted: 2021-05-14

## 2022-08-24 PROBLEM — R60.1 GENERALIZED EDEMA: Status: RESOLVED | Noted: 2021-05-14 | Resolved: 2022-08-24

## 2022-08-24 PROBLEM — R60.1 GENERALIZED EDEMA: Status: ACTIVE | Noted: 2022-08-24

## 2022-09-09 ENCOUNTER — TELEPHONE (OUTPATIENT)
Dept: CARDIOLOGY | Facility: CLINIC | Age: 77
End: 2022-09-09

## 2022-09-09 NOTE — TELEPHONE ENCOUNTER
Contacted PT to let know they have overdue results and to get labs done at their convenience.  PT has covid but said she would when she gets over covid.

## 2022-10-13 ENCOUNTER — TRANSCRIBE ORDERS (OUTPATIENT)
Dept: PHYSICAL THERAPY | Facility: HOSPITAL | Age: 77
End: 2022-10-13

## 2022-10-13 DIAGNOSIS — Z96.651 PRESENCE OF RIGHT ARTIFICIAL KNEE JOINT: Primary | ICD-10-CM

## 2023-01-12 ENCOUNTER — OFFICE VISIT (OUTPATIENT)
Dept: CARDIOLOGY | Facility: CLINIC | Age: 78
End: 2023-01-12
Payer: MEDICARE

## 2023-01-12 VITALS
HEART RATE: 78 BPM | DIASTOLIC BLOOD PRESSURE: 68 MMHG | SYSTOLIC BLOOD PRESSURE: 120 MMHG | HEIGHT: 67 IN | WEIGHT: 201 LBS | BODY MASS INDEX: 31.55 KG/M2 | TEMPERATURE: 97.1 F | OXYGEN SATURATION: 98 %

## 2023-01-12 DIAGNOSIS — R60.1 GENERALIZED EDEMA: ICD-10-CM

## 2023-01-12 DIAGNOSIS — R01.1 HEART MURMUR: ICD-10-CM

## 2023-01-12 DIAGNOSIS — E66.9 OBESITY (BMI 30.0-34.9): ICD-10-CM

## 2023-01-12 DIAGNOSIS — K74.60 CIRRHOSIS OF LIVER WITHOUT ASCITES, UNSPECIFIED HEPATIC CIRRHOSIS TYPE: Primary | ICD-10-CM

## 2023-01-12 PROCEDURE — 99213 OFFICE O/P EST LOW 20 MIN: CPT | Performed by: INTERNAL MEDICINE

## 2023-01-12 RX ORDER — FAMOTIDINE 40 MG/1
TABLET, FILM COATED ORAL
COMMUNITY

## 2023-01-12 NOTE — PROGRESS NOTES
Pita Hyatt  77 y.o. female    1.  Bilateral leg edema  2.  Cirrhosis of the liver  3.  Heart murmur    History of Present Illness  Pita Hyatt is a 77-year-old female who was seen by me for the first time in July 2022 for evaluation of suspected congestive heart failure.  In May 2022 the patient apparently visited [place] for graduation of her granddaughter and developed a urinary tract infection and mental status changes for which she was hospitalized.  She was treated with antibiotics and intravenous fluids.  She was discharged in stable condition and after she returned home she was noted to have significant generalized edema in both the upper and lower extremities and was apparently started on furosemide which significantly improved her symptoms.  The patient denied any chest pain, shortness of breath, palpitation and repeatedly told me that she believes that her heart was all right.  She has documented cirrhosis of the liver of unknown etiology for several decades, abnormal LFTs, thrombocytopenia, splenomegaly, history of recurrent vasovagal syncope going back several decades, gastroesophageal reflux, stress incontinence, hypothyroidism, DJD.  She has no history of hepatitis.    On review of her records, she has been evaluated by Dr. Aparicio in 2019 for atypical chest pain and dobutamine stress echocardiogram showed no wall motion abnormalities.  She was noted to have a systolic murmur back then and echocardiogram did not reveal any significant valve abnormalities.  Transthoracic echocardiogram in August 2019 showed ejection fraction of 62% with mild LVH and normal diastolic function.  There was borderline dilated right ventricle.  No significant valve abnormalities were noted.    The patient was recommended an echocardiogram and BNP but I note that she did not have these tests done.    Patient had an echocardiogram in May 2022 and this showed:   LV ejection fraction appears normal with a range of  60-65%.  Doppler   assessment demonstrates impaired relaxation with moderate elevation in   filling pressure.   •  Mild mitral valve regurgitation.   •  Mild to moderate tricuspid regurgitation.   •  Estimated RV Systolic Pressure = 50 mmHg.   Technical Details   The scan data was acceptable. Visualization was difficult due to suboptimal apical window. In accordance with departmental policy and acknowledgement of no known or documented contraindications image enhancement (Definity) was administered for image enhancement and for endocardium delineation.       Patient was a very poor historian and was confused about her medications and the diagnoses for which she is being treated.  She has recently changed her primary care physician and it appears that she is being treated for elevated ammonia levels.  I do not have any lab results on her to review.  I did try to get in touch with her primary care physician but could not connect.    Allergies   Allergen Reactions   • Pneumococcal Vaccines Swelling   • Phenergan [Promethazine] Itching         Past Medical History:   Diagnosis Date   • Acquired hypothyroidism    • Acute bronchitis    • Acute gastritis    • Acute maxillary sinusitis    • Acute maxillary sinusitis, unspecified    • Acute serous otitis media     R   • Acute sinusitis     Could be R mastoid    • Allergic rhinitis    • Anxiety state    • Chronic sinusitis    • Conjunctivitis    • Cough    • Degenerative joint disease of shoulder region    • Depressive disorder     improving   • Diarrhea    • Dysfunction of eustachian tube    • Epigastric pain    • Female stress incontinence    • Forgetfulness    • Functional diarrhea    • Gynecological Examination    • Knee joint replaced by other means    • Malaise and fatigue    • Menopause    • Nausea and vomiting    • Other Chest pain    • Polyp of colon    • Preop examination, unspecified    • Rhinitis medicamentosa    • Shoulder pain    • Skin tag- removal    •  Subclinical hypothyroidism    • Vagal reaction          Past Surgical History:   Procedure Laterality Date   • COLONOSCOPY  2012   • COLONOSCOPY  2017   • COLONOSCOPY N/A 2020    Procedure: COLONOSCOPY;  Surgeon: Linda Joseph MD;  Location: [facility] ENDOSCOPY;  Service: Gastroenterology;  Laterality: N/A;   • ENDOSCOPY N/A 2020    Procedure: ESOPHAGOGASTRODUODENOSCOPY;  Surgeon: Linda Joseph MD;  Location: [facility] ENDOSCOPY;  Service: Gastroenterology;  Laterality: N/A;   • ENDOSCOPY AND COLONOSCOPY      Diverticulum in sigmoid colon. 2 polyps in ascending colon & descending colon; removed by snare cautery polypectomy   • HYSTERECTOMY     • INJECTION OF MEDICATION      celestone (betamethasone) 12mg   • INJECTION OF MEDICATION      Depo Medrol 2ml   • INJECTION OF MEDICATION      Rocephin 1gm   • JOINT REPLACEMENT Bilateral 2015    basim total knee replacements   • REPLACEMENT TOTAL KNEE     • SINOSCOPY PROCEDURE Bilateral 2018    Procedure: BILATERAL ENDOSCOPIC EHTMOIDECTOMY, MAXILLARY SINUS ANTROSTOMY, FRONTAL RECESS EXPLORATION, POSSIBLE SEPTOPLASY         BRAINLAB;  Surgeon: Daniel Griffith MD;  Location: [facility] OR;  Service: ENT   • TOTAL ABDOMINAL HYSTERECTOMY WITH SALPINGO OOPHORECTOMY     • UPPER GASTROINTESTINAL ENDOSCOPY  2012   • UPPER GASTROINTESTINAL ENDOSCOPY  2017         Family History   Problem Relation Age of Onset   • Hypertension Other    • Stroke Other    • Diabetes Other    • Ulcers Other    • Cancer Other    • Stroke Father    • Sinusitis Mother          Social History     Socioeconomic History   • Marital status:    Tobacco Use   • Smoking status: Former     Types: Cigarettes     Quit date: 1980     Years since quittin.0   • Smokeless tobacco: Never   Substance and Sexual Activity   • Alcohol use: Not Currently     Comment: occasional   • Drug use: No   • Sexual activity: Defer     Birth control/protection: Surgical  "    Comment: Hysterectomy         Current Outpatient Medications   Medication Sig Dispense Refill   • cetirizine (zyrTEC) 10 MG tablet Take 1 tablet by mouth Daily.     • famotidine (PEPCID) 40 MG tablet famotidine 40 mg tablet   Take 1 tablet every day by oral route.     • fluticasone (FLONASE) 50 MCG/ACT nasal spray 1 spray into the nostril(s) as directed by provider.     • furosemide (LASIX) 40 MG tablet      • levothyroxine (SYNTHROID, LEVOTHROID) 100 MCG tablet Take 1 tablet by mouth Daily. 90 tablet 1   • multivitamin-iron-minerals-folic acid (CENTRUM) chewable tablet Chew 1 tablet Daily.     • omeprazole (priLOSEC) 20 MG capsule Take 1 capsule by mouth Daily. 90 capsule 1   • ondansetron (Zofran) 4 MG tablet Take 1 tablet by mouth Every 8 (Eight) Hours As Needed for Nausea or Vomiting. 90 tablet 1   • vitamin D (ERGOCALCIFEROL) 1.25 MG (00387 UT) capsule capsule Take 1 capsule by mouth 1 (One) Time Per Week. 12 capsule 1   • carvedilol (COREG) 3.125 MG tablet Take 3.125 mg by mouth.     • cephalexin (KEFLEX) 500 MG capsule Take 500 mg by mouth.     • Cyanocobalamin (VITAMIN B-12 PO) Take  by mouth.     • cycloSPORINE (RESTASIS) 0.05 % ophthalmic emulsion Apply 1 drop to eye(s) as directed by provider Every 12 (Twelve) Hours. 5.5 mL 5   • ferrous gluconate (FERGON) 324 MG tablet Take 324 mg by mouth.       No current facility-administered medications for this visit.         OBJECTIVE    /68 (BP Location: Left arm, Patient Position: Sitting, Cuff Size: Adult)   Pulse 78   Temp 97.1 °F (36.2 °C)   Ht 170.2 cm (67\")   Wt 91.2 kg (201 lb)   LMP  (LMP Unknown)   SpO2 98%   BMI 31.48 kg/m²         Review of Systems: The following systems were reviewed and changes noted as indicated in the history of present illness    Constitutional:  Denies recent weight loss, weight gain, fever or chills     HENT:  Denies any hearing loss, epistaxis, hoarseness, or difficulty speaking.     Eyes: Wears eyeglasses or " contact lenses     Respiratory:  Denies dyspnea with exertion, no cough, wheezing, or hemoptysis.     Cardiovascular: See HPI    Gastrointestinal: History of cirrhosis of the liver.  See HPI    Endocrine: Negative for cold intolerance, heat intolerance, polydipsia, polyphagia and polyuria.     Genitourinary: Negative.      Musculoskeletal: Denies any history of arthritic symptoms or back problems     Neurological:  Denies any history of recurrent headaches, strokes, TIA, or seizure disorder.     Hematological: History of anemia, thrombocytopenia    Psychiatric/Behavioral: Denies any history of depression, substance abuse, or change in cognitive function.       Physical Exam: The following systems were reviewed and no changes noted    Constitutional: Cooperative, alert and oriented, in no acute distress.     HENT:   Head: Normocephalic, normal hair patterns, no masses or tenderness.  Ears, Nose, and Throat: No gross abnormalities. No pallor or cyanosis.   Eyes: EOMS intact, PERRL, conjunctivae and lids unremarkable. Fundoscopic exam and visual fields not performed.   Neck: No palpable masses or adenopathy, no thyromegaly, no JVD, carotid pulses are full and equal bilaterally and without bruit.     Cardiovascular: Regular rhythm, S1 and S2 normal, no S3 or S4. 2/6 systolic murmur, no gallops, or rubs detected.     Pulmonary/Chest: Chest: normal symmetry, normal respiratory excursion, no intercostal retraction, no use of accessory muscles.     Pulmonary: Normal breath sounds. No rales or rhonchi.    Abdominal: Abdomen soft, mild distention, bowel sounds normoactive    Musculoskeletal: No deformities, clubbing, cyanosis, erythema,  2+ edema lower extremities    Neurological: No gross motor or sensory deficits noted, affect appropriate, oriented to time, person, place.     Skin: Warm and dry to the touch, no apparent skin lesion or mass noted.     Psychiatric: She has a normal mood and affect. Her behavior is normal.  Judgment and thought content normal.         Procedures      Lab Results   Component Value Date    WBC 3.77 05/06/2021    HGB 10.7 (L) 05/06/2021    HCT 32.9 (L) 05/06/2021    MCV 91.6 05/06/2021     (L) 05/06/2021     Lab Results   Component Value Date    GLUCOSE 113 (H) 05/06/2021    BUN 13 05/06/2021    CREATININE 0.82 05/06/2021    EGFRIFNONA 67 05/06/2021    BCR 15.9 05/06/2021    CO2 22.0 05/06/2021    CALCIUM 9.7 05/06/2021    ALBUMIN 3.10 (L) 05/06/2021    AST 88 (H) 05/06/2021    ALT 32 05/06/2021     No results found for: CHOL  Lab Results   Component Value Date    TRIG 116 04/11/2017     No results found for: HDL  No components found for: LDLCALC  No results found for: LDL  No results found for: HDLLDLRATIO  No components found for: CHOLHDL  Lab Results   Component Value Date    HGBA1C 5.30 08/06/2019     Lab Results   Component Value Date    TSH 3.700 08/06/2019           ASSESSMENT AND PLAN  Pita Hyatt is a 77-year-old female with cirrhosis of the liver of unknown etiology for several decades, abnormal LFTs, thrombocytopenia, splenomegaly, history of recurrent vasovagal syncope going back several decades, gastroesophageal reflux, stress incontinence, hypothyroidism, DJD.  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  She repeatedly told me that she does not believe that she has any heart problems.  After reviewing the overall situation, I believe that she can continue follow-up with her primary care physician and I will have a conversation with Dr. Esquivel's when I am able to reach him.  It appears that her liver issues are most likely responsible for her symptoms.    Diagnoses and all orders for this visit:    1. Cirrhosis of liver without ascites, unspecified hepatic cirrhosis type (HCC)  (Primary)    2. Obesity (BMI 30.0-34.9)    3. Generalized edema        BMI is >= 30 and <35. (Class 1 Obesity). The following options were offered after discussion;: nutrition counseling/recommendations      Pita Hyatt  reports that she quit smoking about 43 years ago. Her smoking use included cigarettes. She has never used smokeless tobacco.        Saranya Perry MD  1/12/2023  14:27 CST

## 2023-01-26 ENCOUNTER — OFFICE VISIT (OUTPATIENT)
Dept: GASTROENTEROLOGY | Facility: CLINIC | Age: 78
End: 2023-01-26
Payer: MEDICARE

## 2023-01-26 VITALS
BODY MASS INDEX: 30.87 KG/M2 | WEIGHT: 196.7 LBS | HEART RATE: 83 BPM | SYSTOLIC BLOOD PRESSURE: 102 MMHG | HEIGHT: 67 IN | DIASTOLIC BLOOD PRESSURE: 64 MMHG

## 2023-01-26 DIAGNOSIS — K74.60 CIRRHOSIS OF LIVER WITHOUT ASCITES, UNSPECIFIED HEPATIC CIRRHOSIS TYPE: Primary | ICD-10-CM

## 2023-01-26 PROCEDURE — 99213 OFFICE O/P EST LOW 20 MIN: CPT | Performed by: INTERNAL MEDICINE

## 2023-01-26 RX ORDER — SPIRONOLACTONE 50 MG/1
50 TABLET, FILM COATED ORAL 2 TIMES DAILY
Qty: 60 TABLET | Refills: 5 | Status: SHIPPED | OUTPATIENT
Start: 2023-01-26

## 2023-01-26 NOTE — PROGRESS NOTES
[facility]      Chief Complaint:   Chief Complaint   Patient presents with   • Cirrhosis   • Fluid Retention       Subjective     HPI:   Patient with Richard syndrome.  Patient has had worsening retention of fluid over the past few months.  Patient states she had lab work done in [place] is lab work is not accessible at this time p.o.  Patient has stopped taking her Lasix which I think accounts for a lot of the fluid retention at this time.  Will place patient on Lasix and Aldactone to see if I can improve this with swelling we will have patient follow-up in 4 weeks let patient bring the results from [place] and for the ultrasound and her lab work.    Plan; patient to follow-up in 4    Past Medical History:   Past Medical History:   Diagnosis Date   • Acquired hypothyroidism    • Acute bronchitis    • Acute gastritis    • Acute maxillary sinusitis    • Acute maxillary sinusitis, unspecified    • Acute serous otitis media     R   • Acute sinusitis     Could be R mastoid    • Allergic rhinitis    • Anxiety state    • Chronic sinusitis    • Conjunctivitis    • Cough    • Degenerative joint disease of shoulder region    • Depressive disorder     improving   • Diarrhea    • Dysfunction of eustachian tube    • Epigastric pain    • Female stress incontinence    • Forgetfulness    • Functional diarrhea    • Gynecological Examination    • Knee joint replaced by other means    • Malaise and fatigue    • Menopause    • Nausea and vomiting    • Other Chest pain    • Polyp of colon    • Preop examination, unspecified    • Rhinitis medicamentosa    • Shoulder pain    • Skin tag- removal    • Subclinical hypothyroidism    • Vagal reaction        Past Surgical History:    Past Surgical History:   Procedure Laterality Date   • COLONOSCOPY  07/16/2012   • COLONOSCOPY  01/13/2017   • COLONOSCOPY N/A 7/14/2020    Procedure: COLONOSCOPY;  Surgeon: Linda Joseph MD;  Location: [facility]  ENDOSCOPY;  Service: Gastroenterology;  Laterality: N/A;   • ENDOSCOPY N/A 7/14/2020    Procedure: ESOPHAGOGASTRODUODENOSCOPY;  Surgeon: Linda Joseph MD;  Location: [facility] ENDOSCOPY;  Service: Gastroenterology;  Laterality: N/A;   • ENDOSCOPY AND COLONOSCOPY      Diverticulum in sigmoid colon. 2 polyps in ascending colon & descending colon; removed by snare cautery polypectomy   • HYSTERECTOMY     • INJECTION OF MEDICATION      celestone (betamethasone) 12mg   • INJECTION OF MEDICATION      Depo Medrol 2ml   • INJECTION OF MEDICATION      Rocephin 1gm   • JOINT REPLACEMENT Bilateral 2015 2013    basim total knee replacements   • REPLACEMENT TOTAL KNEE     • SINOSCOPY PROCEDURE Bilateral 4/16/2018    Procedure: BILATERAL ENDOSCOPIC EHTMOIDECTOMY, MAXILLARY SINUS ANTROSTOMY, FRONTAL RECESS EXPLORATION, POSSIBLE SEPTOPLASY         BRAINLAB;  Surgeon: Daniel Griffith MD;  Location: [facility] OR;  Service: ENT   • TOTAL ABDOMINAL HYSTERECTOMY WITH SALPINGO OOPHORECTOMY  1985   • UPPER GASTROINTESTINAL ENDOSCOPY  07/16/2012   • UPPER GASTROINTESTINAL ENDOSCOPY  01/13/2017       Family History:  Family History   Problem Relation Age of Onset   • Hypertension Other    • Stroke Other    • Diabetes Other    • Ulcers Other    • Cancer Other    • Stroke Father    • Sinusitis Mother        Social History:   reports that she quit smoking about 43 years ago. Her smoking use included cigarettes. She has never used smokeless tobacco. She reports that she does not currently use alcohol. She reports that she does not use drugs.    Medications:   Prior to Admission medications    Medication Sig Start Date End Date Taking? Authorizing Provider   cetirizine (zyrTEC) 10 MG tablet Take 1 tablet by mouth Daily.   Yes ProviderElinor MD   famotidine (PEPCID) 40 MG tablet famotidine 40 mg tablet   Take 1 tablet every day by oral route.   Yes Provider, MD Elinor   fluticasone (FLONASE) 50 MCG/ACT nasal spray 1 spray into  "the nostril(s) as directed by provider.   Yes Provider, MD Elinor   furosemide (LASIX) 40 MG tablet  6/9/22  Yes Elinor Britt MD   levothyroxine (SYNTHROID, LEVOTHROID) 100 MCG tablet Take 1 tablet by mouth Daily. 1/10/22  Yes Victoria Kingston APRN   multivitamin-iron-minerals-folic acid (CENTRUM) chewable tablet Chew 1 tablet Daily. 5/18/22  Yes Elinor Britt MD   omeprazole (priLOSEC) 20 MG capsule Take 1 capsule by mouth Daily. 5/14/21  Yes Arash Martins MD   ondansetron (Zofran) 4 MG tablet Take 1 tablet by mouth Every 8 (Eight) Hours As Needed for Nausea or Vomiting. 1/10/22  Yes Victoria Kingston APRN   spironolactone (Aldactone) 50 MG tablet Take 1 tablet by mouth 2 (Two) Times a Day. 1/26/23   Hayden Singer MD   vitamin D (ERGOCALCIFEROL) 1.25 MG (47264 UT) capsule capsule Take 1 capsule by mouth 1 (One) Time Per Week. 5/14/21   Arash Martins MD       Allergies:  Pneumococcal vaccines and Phenergan [promethazine]    ROS:    Review of Systems   Constitutional: Negative for activity change, appetite change, chills, diaphoresis, fatigue, fever and unexpected weight change.   HENT: Negative for sore throat and trouble swallowing.    Respiratory: Negative for shortness of breath.    Gastrointestinal: Negative for abdominal distention, abdominal pain, anal bleeding, blood in stool, constipation, diarrhea, nausea, rectal pain and vomiting.   Endocrine: Negative for polydipsia, polyphagia and polyuria.   Genitourinary: Negative for difficulty urinating.   Musculoskeletal: Negative for arthralgias.   Skin: Negative for pallor.   Allergic/Immunologic: Negative for food allergies.   Neurological: Negative for weakness and light-headedness.   Psychiatric/Behavioral: Negative for behavioral problems.     Objective     Blood pressure 102/64, pulse 83, height 170.2 cm (67\"), weight 89.2 kg (196 lb 11.2 oz), not currently breastfeeding.    Physical Exam  Constitutional:       General: " She is not in acute distress.     Appearance: She is well-developed. She is not diaphoretic.   HENT:      Head: Normocephalic and atraumatic.   Cardiovascular:      Rate and Rhythm: Normal rate and regular rhythm.      Heart sounds: Normal heart sounds. No murmur heard.    No friction rub. No gallop.   Pulmonary:      Effort: No respiratory distress.      Breath sounds: Normal breath sounds. No wheezing or rales.   Chest:      Chest wall: No tenderness.   Abdominal:      General: Bowel sounds are normal. There is no distension.      Palpations: Abdomen is soft. There is no mass.      Tenderness: There is no abdominal tenderness. There is no guarding or rebound.      Hernia: No hernia is present.   Musculoskeletal:         General: Swelling present. Normal range of motion.   Skin:     General: Skin is warm and dry.      Coloration: Skin is not pale.      Findings: No erythema or rash.   Neurological:      Mental Status: She is alert and oriented to person, place, and time.   Psychiatric:         Behavior: Behavior normal.         Thought Content: Thought content normal.         Judgment: Judgment normal.          Assessment & Plan   Diagnoses and all orders for this visit:    1. Cirrhosis of liver without ascites, unspecified hepatic cirrhosis type (HCC) (Primary)    Other orders  -     spironolactone (Aldactone) 50 MG tablet; Take 1 tablet by mouth 2 (Two) Times a Day.  Dispense: 60 tablet; Refill: 5        * Surgery not found *     Diagnosis Plan   1. Cirrhosis of liver without ascites, unspecified hepatic cirrhosis type (HCC)            Anticipated Surgical Procedure:  No orders of the defined types were placed in this encounter.      The risks, benefits, and alternatives of this procedure have been discussed with the patient or the responsible party- the patient understands and agrees to proceed.

## 2023-05-23 ENCOUNTER — HOSPITAL ENCOUNTER (EMERGENCY)
Facility: HOSPITAL | Age: 78
Discharge: HOME | End: 2023-05-24
Attending: EMERGENCY MEDICINE | Admitting: EMERGENCY MEDICINE

## 2023-05-23 ENCOUNTER — APPOINTMENT (OUTPATIENT)
Dept: GENERAL RADIOLOGY | Facility: HOSPITAL | Age: 78
End: 2023-05-23

## 2023-05-23 ENCOUNTER — APPOINTMENT (OUTPATIENT)
Dept: CT IMAGING | Facility: HOSPITAL | Age: 78
End: 2023-05-23

## 2023-05-23 DIAGNOSIS — E72.20 HYPERAMMONEMIA: Primary | ICD-10-CM

## 2023-05-23 LAB
ACETONE BLD QL: NEGATIVE
ALBUMIN SERPL-MCNC: 2 G/DL (ref 3.5–5.2)
ALBUMIN/GLOB SERPL: 0.6 G/DL
ALP SERPL-CCNC: 101 U/L (ref 39–117)
ALT SERPL W P-5'-P-CCNC: 13 U/L (ref 1–33)
AMMONIA BLD-SCNC: 99 UMOL/L (ref 11–51)
AMPHET+METHAMPHET UR QL: NEGATIVE
AMPHETAMINES UR QL: NEGATIVE
ANION GAP SERPL CALCULATED.3IONS-SCNC: 6 MMOL/L (ref 5–15)
APAP SERPL-MCNC: <5 MCG/ML (ref 0–30)
ARTERIAL PATENCY WRIST A: ABNORMAL
AST SERPL-CCNC: 29 U/L (ref 1–32)
ATMOSPHERIC PRESS: 750 MMHG
BARBITURATES UR QL SCN: NEGATIVE
BASE EXCESS BLDA CALC-SCNC: 0 MMOL/L (ref 0–2)
BASOPHILS # BLD AUTO: 0.07 10*3/MM3 (ref 0–0.2)
BASOPHILS NFR BLD AUTO: 1.1 % (ref 0–1.5)
BDY SITE: ABNORMAL
BENZODIAZ UR QL SCN: NEGATIVE
BILIRUB SERPL-MCNC: 1.6 MG/DL (ref 0–1.2)
BILIRUB UR QL STRIP: NEGATIVE
BUN SERPL-MCNC: 22 MG/DL (ref 8–23)
BUN/CREAT SERPL: 23.4 (ref 7–25)
BUPRENORPHINE SERPL-MCNC: NEGATIVE NG/ML
CALCIUM SPEC-SCNC: 10.4 MG/DL (ref 8.6–10.5)
CANNABINOIDS SERPL QL: NEGATIVE
CHLORIDE SERPL-SCNC: 111 MMOL/L (ref 98–107)
CLARITY UR: ABNORMAL
CO2 SERPL-SCNC: 21 MMOL/L (ref 22–29)
COCAINE UR QL: NEGATIVE
COLOR UR: YELLOW
CREAT SERPL-MCNC: 0.94 MG/DL (ref 0.57–1)
D-LACTATE SERPL-SCNC: 1.7 MMOL/L (ref 0.5–2)
DEPRECATED RDW RBC AUTO: 54.7 FL (ref 37–54)
EGFRCR SERPLBLD CKD-EPI 2021: 61.5 ML/MIN/1.73
EOSINOPHIL # BLD AUTO: 0.63 10*3/MM3 (ref 0–0.4)
EOSINOPHIL NFR BLD AUTO: 9.8 % (ref 0.3–6.2)
ERYTHROCYTE [DISTWIDTH] IN BLOOD BY AUTOMATED COUNT: 15 % (ref 12.3–15.4)
ETHANOL BLD-MCNC: <10 MG/DL (ref 0–10)
ETHANOL UR QL: <0.01 %
FENTANYL UR-MCNC: NEGATIVE NG/ML
GAS FLOW AIRWAY: 2 LPM
GEN 5 2HR TROPONIN T REFLEX: 13 NG/L
GLOBULIN UR ELPH-MCNC: 3.3 GM/DL
GLUCOSE SERPL-MCNC: 101 MG/DL (ref 65–99)
GLUCOSE UR STRIP-MCNC: NEGATIVE MG/DL
HCO3 BLDA-SCNC: 22.9 MMOL/L (ref 20–26)
HCT VFR BLD AUTO: 26.8 % (ref 34–46.6)
HGB BLD-MCNC: 8.8 G/DL (ref 12–15.9)
HGB UR QL STRIP.AUTO: NEGATIVE
IMM GRANULOCYTES # BLD AUTO: 0.02 10*3/MM3 (ref 0–0.05)
IMM GRANULOCYTES NFR BLD AUTO: 0.3 % (ref 0–0.5)
KETONES UR QL STRIP: ABNORMAL
LEUKOCYTE ESTERASE UR QL STRIP.AUTO: NEGATIVE
LYMPHOCYTES # BLD AUTO: 1.37 10*3/MM3 (ref 0.7–3.1)
LYMPHOCYTES NFR BLD AUTO: 21.2 % (ref 19.6–45.3)
MAGNESIUM SERPL-MCNC: 1.7 MG/DL (ref 1.6–2.4)
MCH RBC QN AUTO: 32.6 PG (ref 26.6–33)
MCHC RBC AUTO-ENTMCNC: 32.8 G/DL (ref 31.5–35.7)
MCV RBC AUTO: 99.3 FL (ref 79–97)
METHADONE UR QL SCN: NEGATIVE
MODALITY: ABNORMAL
MONOCYTES # BLD AUTO: 0.78 10*3/MM3 (ref 0.1–0.9)
MONOCYTES NFR BLD AUTO: 12.1 % (ref 5–12)
NEUTROPHILS NFR BLD AUTO: 3.58 10*3/MM3 (ref 1.7–7)
NEUTROPHILS NFR BLD AUTO: 55.5 % (ref 42.7–76)
NITRITE UR QL STRIP: NEGATIVE
NRBC BLD AUTO-RTO: 0 /100 WBC (ref 0–0.2)
NT-PROBNP SERPL-MCNC: 321.8 PG/ML (ref 0–1800)
OPIATES UR QL: POSITIVE
OXYCODONE UR QL SCN: NEGATIVE
PCO2 BLDA: 31 MM HG (ref 35–45)
PCP UR QL SCN: NEGATIVE
PH BLDA: 7.48 PH UNITS (ref 7.35–7.45)
PH UR STRIP.AUTO: <=5 [PH] (ref 5–9)
PLATELET # BLD AUTO: 92 10*3/MM3 (ref 140–450)
PMV BLD AUTO: 9.3 FL (ref 6–12)
PO2 BLDA: 93.7 MM HG (ref 83–108)
POTASSIUM SERPL-SCNC: 4 MMOL/L (ref 3.5–5.2)
PROPOXYPH UR QL: NEGATIVE
PROT SERPL-MCNC: 5.3 G/DL (ref 6–8.5)
PROT UR QL STRIP: NEGATIVE
RBC # BLD AUTO: 2.7 10*6/MM3 (ref 3.77–5.28)
SALICYLATES SERPL-MCNC: <0.3 MG/DL
SAO2 % BLDCOA: 98.7 % (ref 94–99)
SODIUM SERPL-SCNC: 138 MMOL/L (ref 136–145)
SP GR UR STRIP: 1.02 (ref 1–1.03)
TRICYCLICS UR QL SCN: NEGATIVE
TROPONIN T DELTA: 1 NG/L
TROPONIN T SERPL HS-MCNC: 12 NG/L
TSH SERPL DL<=0.05 MIU/L-ACNC: 4.96 UIU/ML (ref 0.27–4.2)
UROBILINOGEN UR QL STRIP: ABNORMAL
VENTILATOR MODE: ABNORMAL
WBC NRBC COR # BLD: 6.45 10*3/MM3 (ref 3.4–10.8)
WHOLE BLOOD HOLD SPECIMEN: NORMAL

## 2023-05-23 PROCEDURE — 36600 WITHDRAWAL OF ARTERIAL BLOOD: CPT

## 2023-05-23 PROCEDURE — 82077 ASSAY SPEC XCP UR&BREATH IA: CPT | Performed by: EMERGENCY MEDICINE

## 2023-05-23 PROCEDURE — 84484 ASSAY OF TROPONIN QUANT: CPT | Performed by: EMERGENCY MEDICINE

## 2023-05-23 PROCEDURE — 93005 ELECTROCARDIOGRAM TRACING: CPT | Performed by: EMERGENCY MEDICINE

## 2023-05-23 PROCEDURE — 99284 EMERGENCY DEPT VISIT MOD MDM: CPT

## 2023-05-23 PROCEDURE — 84443 ASSAY THYROID STIM HORMONE: CPT | Performed by: EMERGENCY MEDICINE

## 2023-05-23 PROCEDURE — 80053 COMPREHEN METABOLIC PANEL: CPT | Performed by: EMERGENCY MEDICINE

## 2023-05-23 PROCEDURE — 85025 COMPLETE CBC W/AUTO DIFF WBC: CPT | Performed by: EMERGENCY MEDICINE

## 2023-05-23 PROCEDURE — 80143 DRUG ASSAY ACETAMINOPHEN: CPT | Performed by: EMERGENCY MEDICINE

## 2023-05-23 PROCEDURE — 85007 BL SMEAR W/DIFF WBC COUNT: CPT | Performed by: EMERGENCY MEDICINE

## 2023-05-23 PROCEDURE — 80307 DRUG TEST PRSMV CHEM ANLYZR: CPT | Performed by: EMERGENCY MEDICINE

## 2023-05-23 PROCEDURE — 70450 CT HEAD/BRAIN W/O DYE: CPT

## 2023-05-23 PROCEDURE — 82140 ASSAY OF AMMONIA: CPT | Performed by: EMERGENCY MEDICINE

## 2023-05-23 PROCEDURE — 81003 URINALYSIS AUTO W/O SCOPE: CPT | Performed by: EMERGENCY MEDICINE

## 2023-05-23 PROCEDURE — 82803 BLOOD GASES ANY COMBINATION: CPT

## 2023-05-23 PROCEDURE — 36415 COLL VENOUS BLD VENIPUNCTURE: CPT

## 2023-05-23 PROCEDURE — 83605 ASSAY OF LACTIC ACID: CPT | Performed by: EMERGENCY MEDICINE

## 2023-05-23 PROCEDURE — 80179 DRUG ASSAY SALICYLATE: CPT | Performed by: EMERGENCY MEDICINE

## 2023-05-23 PROCEDURE — 93005 ELECTROCARDIOGRAM TRACING: CPT

## 2023-05-23 PROCEDURE — 83735 ASSAY OF MAGNESIUM: CPT | Performed by: EMERGENCY MEDICINE

## 2023-05-23 PROCEDURE — 83880 ASSAY OF NATRIURETIC PEPTIDE: CPT | Performed by: EMERGENCY MEDICINE

## 2023-05-23 PROCEDURE — 82009 KETONE BODYS QUAL: CPT | Performed by: EMERGENCY MEDICINE

## 2023-05-23 PROCEDURE — 71045 X-RAY EXAM CHEST 1 VIEW: CPT

## 2023-05-23 RX ORDER — ACETAMINOPHEN AND CODEINE PHOSPHATE 300; 30 MG/1; MG/1
1 TABLET ORAL 2 TIMES DAILY PRN
COMMUNITY

## 2023-05-23 RX ORDER — SODIUM CHLORIDE 0.9 % (FLUSH) 0.9 %
10 SYRINGE (ML) INJECTION AS NEEDED
Status: DISCONTINUED | OUTPATIENT
Start: 2023-05-23 | End: 2023-05-24 | Stop reason: HOSPADM

## 2023-05-23 RX ORDER — LACTULOSE 10 G/15ML
30 SOLUTION ORAL DAILY
COMMUNITY
Start: 2023-01-11

## 2023-05-23 RX ORDER — FERROUS SULFATE 325(65) MG
325 TABLET ORAL
COMMUNITY

## 2023-05-23 RX ORDER — NAPROXEN SODIUM 220 MG
220 TABLET ORAL 2 TIMES DAILY PRN
COMMUNITY

## 2023-05-24 VITALS
HEIGHT: 67 IN | BODY MASS INDEX: 29.82 KG/M2 | HEART RATE: 78 BPM | TEMPERATURE: 98.2 F | WEIGHT: 190 LBS | DIASTOLIC BLOOD PRESSURE: 64 MMHG | SYSTOLIC BLOOD PRESSURE: 151 MMHG | OXYGEN SATURATION: 100 % | RESPIRATION RATE: 20 BRPM

## 2023-05-24 LAB
ANISOCYTOSIS BLD QL: NORMAL
QT INTERVAL: 388 MS
QTC INTERVAL: 447 MS
SMALL PLATELETS BLD QL SMEAR: ADEQUATE
WBC MORPH BLD: NORMAL

## 2023-05-24 NOTE — DISCHARGE INSTRUCTIONS
Please return with new or worsening symptoms.  Take lactulose 30 mL daily as directed and follow-up with primary care for recheck of ammonia level.  
23.9

## 2023-05-24 NOTE — ED PROVIDER NOTES
Subjective   History of Present Illness  78-year-old female presents to the emergency department with concern for fatigue, falls, weakness and some confusion.  She is accompanied by a granddaughter who lives out of state but came to visit when relatives and neighbors noticed patient was having increasing confusion.  She has history of cirrhosis.  She initially reports that she is taking lactulose as prescribed.  She is not having fevers and denies any urinary symptoms.  No cough or congestion.    Family history, surgical history, social history, current medications and allergies are reviewed with the patient and triage documentation and vitals are reviewed.    History provided by:  Patient, medical records and relative   used: No        Review of Systems   Constitutional: Negative for chills and fever.   HENT: Negative for congestion and sore throat.    Eyes: Negative for photophobia and visual disturbance.   Respiratory: Negative for cough, shortness of breath and wheezing.    Cardiovascular: Negative for chest pain, palpitations and leg swelling.   Gastrointestinal: Negative for abdominal pain, diarrhea, nausea and vomiting.   Endocrine: Negative for polydipsia, polyphagia and polyuria.   Genitourinary: Negative for dysuria, frequency and urgency.   Musculoskeletal: Negative for arthralgias, back pain, myalgias and neck pain.   Skin: Negative for rash and wound.   Allergic/Immunologic: Negative.    Neurological: Positive for weakness and light-headedness. Negative for facial asymmetry and speech difficulty.   Hematological: Negative.    Psychiatric/Behavioral: Positive for confusion. Negative for hallucinations.       Past Medical History:   Diagnosis Date   • Acquired hypothyroidism    • Acute bronchitis    • Acute gastritis    • Acute maxillary sinusitis    • Acute maxillary sinusitis, unspecified    • Acute serous otitis media     R   • Acute sinusitis     Could be R mastoid    • Allergic  rhinitis    • Anxiety state    • Chronic sinusitis    • Conjunctivitis    • Cough    • Degenerative joint disease of shoulder region    • Depressive disorder     improving   • Diarrhea    • Dysfunction of eustachian tube    • Epigastric pain    • Female stress incontinence    • Forgetfulness    • Functional diarrhea    • Gynecological Examination    • Knee joint replaced by other means    • Malaise and fatigue    • Menopause    • Nausea and vomiting    • Other Chest pain    • Polyp of colon    • Preop examination, unspecified    • Rhinitis medicamentosa    • Shoulder pain    • Skin tag- removal    • Subclinical hypothyroidism    • Vagal reaction        Allergies   Allergen Reactions   • Pneumococcal Vaccines Swelling   • Phenergan [Promethazine] Itching       Past Surgical History:   Procedure Laterality Date   • COLONOSCOPY  07/16/2012   • COLONOSCOPY  01/13/2017   • COLONOSCOPY N/A 7/14/2020    Procedure: COLONOSCOPY;  Surgeon: Linda Joseph MD;  Location: [facility] ENDOSCOPY;  Service: Gastroenterology;  Laterality: N/A;   • ENDOSCOPY N/A 7/14/2020    Procedure: ESOPHAGOGASTRODUODENOSCOPY;  Surgeon: Linda Joseph MD;  Location: [facility] ENDOSCOPY;  Service: Gastroenterology;  Laterality: N/A;   • ENDOSCOPY AND COLONOSCOPY      Diverticulum in sigmoid colon. 2 polyps in ascending colon & descending colon; removed by snare cautery polypectomy   • HYSTERECTOMY     • INJECTION OF MEDICATION      celestone (betamethasone) 12mg   • INJECTION OF MEDICATION      Depo Medrol 2ml   • INJECTION OF MEDICATION      Rocephin 1gm   • JOINT REPLACEMENT Bilateral 2015 2013    basim total knee replacements   • REPLACEMENT TOTAL KNEE     • SINOSCOPY PROCEDURE Bilateral 4/16/2018    Procedure: BILATERAL ENDOSCOPIC EHTMOIDECTOMY, MAXILLARY SINUS ANTROSTOMY, FRONTAL RECESS EXPLORATION, POSSIBLE SEPTOPLASY         BRAINLAB;  Surgeon: Daniel Griffith MD;  Location: [facility] OR;  Service: ENT   • TOTAL ABDOMINAL HYSTERECTOMY  WITH SALPINGO OOPHORECTOMY     • UPPER GASTROINTESTINAL ENDOSCOPY  2012   • UPPER GASTROINTESTINAL ENDOSCOPY  2017       Family History   Problem Relation Age of Onset   • Hypertension Other    • Stroke Other    • Diabetes Other    • Ulcers Other    • Cancer Other    • Stroke Father    • Sinusitis Mother        Social History     Socioeconomic History   • Marital status:    Tobacco Use   • Smoking status: Former     Types: Cigarettes     Quit date:      Years since quittin.4   • Smokeless tobacco: Never   Substance and Sexual Activity   • Alcohol use: Not Currently     Comment: occasional   • Drug use: No   • Sexual activity: Defer     Birth control/protection: Surgical     Comment: Hysterectomy           Objective   Physical Exam  Vitals and nursing note reviewed.   Constitutional:       General: She is not in acute distress.     Appearance: She is well-developed. She is obese. She is not ill-appearing, toxic-appearing or diaphoretic.   HENT:      Head: Normocephalic.      Mouth/Throat:      Mouth: Mucous membranes are moist.   Eyes:      General: No scleral icterus.     Pupils: Pupils are equal, round, and reactive to light.   Cardiovascular:      Rate and Rhythm: Normal rate and regular rhythm.      Heart sounds: No murmur heard.  Pulmonary:      Effort: Pulmonary effort is normal.      Breath sounds: Normal breath sounds.   Abdominal:      General: Bowel sounds are normal.      Palpations: Abdomen is soft.   Musculoskeletal:      Cervical back: Normal range of motion and neck supple.   Skin:     General: Skin is warm and dry.      Capillary Refill: Capillary refill takes less than 2 seconds.   Neurological:      Mental Status: She is alert. She is confused.      GCS: GCS eye subscore is 4. GCS verbal subscore is 5. GCS motor subscore is 6.      Cranial Nerves: No cranial nerve deficit or dysarthria.      Sensory: No sensory deficit.      Motor: No weakness.   Psychiatric:          Behavior: Behavior is not agitated.         ECG 12 Lead      Date/Time: 5/23/2023 11:54 PM  Performed by: Howard Kirby DO  Authorized by: Howard Kirby DO   Interpreted by physician  Comments: EKG May 23, 2023 at 1919 reveals normal sinus rhythm rate of 80 bpm.  T wave flattening in lead III and V3 without inversion.  No ST elevation or depression.  No evidence of acute ischemia.  QTc 447.                 ED Course      Labs Reviewed   COMPREHENSIVE METABOLIC PANEL - Abnormal; Notable for the following components:       Result Value    Glucose 101 (*)     Chloride 111 (*)     CO2 21.0 (*)     Total Protein 5.3 (*)     Albumin 2.0 (*)     Total Bilirubin 1.6 (*)     All other components within normal limits    Narrative:     GFR Normal >60  Chronic Kidney Disease <60  Kidney Failure <15    The GFR formula is only valid for adults with stable renal function between ages 18 and 70.   URINALYSIS W/ MICROSCOPIC IF INDICATED (NO CULTURE) - Abnormal; Notable for the following components:    Appearance, UA Turbid (*)     Ketones, UA Trace (*)     All other components within normal limits    Narrative:     Urine microscopic not indicated.   TROPONIN - Abnormal; Notable for the following components:    HS Troponin T 12 (*)     All other components within normal limits    Narrative:     High Sensitive Troponin T Reference Range:  <10.0 ng/L- Negative Female for AMI  <15.0 ng/L- Negative Male for AMI  >=10 - Abnormal Female indicating possible myocardial injury.  >=15 - Abnormal Male indicating possible myocardial injury.   Clinicians would have to utilize clinical acumen, EKG, Troponin, and serial changes to determine if it is an Acute Myocardial Infarction or myocardial injury due to an underlying chronic condition.        URINE DRUG SCREEN - Abnormal; Notable for the following components:    Opiate Screen Positive (*)     All other components within normal limits    Narrative:     Cutoff For Drugs  Screened:    Amphetamines               500 ng/ml  Barbiturates               200 ng/ml  Benzodiazepines            150 ng/ml  Cocaine                    150 ng/ml  Methadone                  200 ng/ml  Opiates                    100 ng/ml  Phencyclidine               25 ng/ml  THC                            50 ng/ml  Methamphetamine            500 ng/ml  Tricyclic Antidepressants  300 ng/ml  Oxycodone                  100 ng/ml  Propoxyphene               300 ng/ml  Buprenorphine               10 ng/ml    The normal value for all drugs tested is negative. This report includes unconfirmed screening results, with the cutoff values listed, to be used for medical treatment purposes only.  Unconfirmed results must not be used for non-medical purposes such as employment or legal testing.  Clinical consideration should be applied to any drug of abuse test, particularly when unconfirmed results are used.     AMMONIA - Abnormal; Notable for the following components:    Ammonia 99 (*)     All other components within normal limits   TSH - Abnormal; Notable for the following components:    TSH 4.960 (*)     All other components within normal limits   CBC WITH AUTO DIFFERENTIAL - Abnormal; Notable for the following components:    RBC 2.70 (*)     Hemoglobin 8.8 (*)     Hematocrit 26.8 (*)     MCV 99.3 (*)     RDW-SD 54.7 (*)     Platelets 92 (*)     Monocyte % 12.1 (*)     Eosinophil % 9.8 (*)     Eosinophils, Absolute 0.63 (*)     All other components within normal limits   BLOOD GAS, ARTERIAL - Abnormal; Notable for the following components:    pH, Arterial 7.477 (*)     pCO2, Arterial 31.0 (*)     All other components within normal limits   HIGH SENSITIVITIY TROPONIN T 2HR - Abnormal; Notable for the following components:    HS Troponin T 13 (*)     All other components within normal limits    Narrative:     High Sensitive Troponin T Reference Range:  <10.0 ng/L- Negative Female for AMI  <15.0 ng/L- Negative Male for  AMI  >=10 - Abnormal Female indicating possible myocardial injury.  >=15 - Abnormal Male indicating possible myocardial injury.   Clinicians would have to utilize clinical acumen, EKG, Troponin, and serial changes to determine if it is an Acute Myocardial Infarction or myocardial injury due to an underlying chronic condition.        BNP (IN-HOUSE) - Normal    Narrative:     Among patients with dyspnea, NT-proBNP is highly sensitive for the detection of acute congestive heart failure. In addition NT-proBNP of <300 pg/ml effectively rules out acute congestive heart failure with 99% negative predictive value.    Results may be falsely decreased if patient taking Biotin.     LACTIC ACID, PLASMA - Normal   ACETAMINOPHEN LEVEL - Normal   SALICYLATE LEVEL - Normal   MAGNESIUM - Normal   ACETONE - Normal   FENTANYL, URINE - Normal    Narrative:     Negative Threshold:      Fentanyl 5 ng/mL     The normal value for the drug tested is negative. This report includes final unconfirmed screening results to be used for medical treatment purposes only. Unconfirmed results must not be used for non-medical purposes such as employment or legal testing. Clinical consideration should be applied to any drug of abuse test, particularly when unconfirmed results are used.          ETHANOL   SCAN SLIDE   CBC AND DIFFERENTIAL    Narrative:     The following orders were created for panel order CBC & Differential.  Procedure                               Abnormality         Status                     ---------                               -----------         ------                     CBC Auto Differential[[id number]]        Abnormal            Final result               Scan Slide[[id number]]                                       Final result                 Please view results for these tests on the individual orders.   KETONE BODIES SERUM    Narrative:     The following orders were created for panel order Ketone Bodies, Serum (Not performed  at [facility]).  Procedure                               Abnormality         Status                     ---------                               -----------         ------                     Acetone[[id number]]                      Normal              Final result                 Please view results for these tests on the individual orders.   EXTRA TUBES    Narrative:     The following orders were created for panel order Extra Tubes.  Procedure                               Abnormality         Status                     ---------                               -----------         ------                     Lavender Top[[id number]]                                     Final result               Light Blue Top[[id number]]                                                                Please view results for these tests on the individual orders.   LAVENDER TOP     CT Head Without Contrast    Result Date: 5/23/2023  Narrative: HISTORY: Altered mental status. COMPARISON: None TECHNIQUE: Axial images were performed from the calvarium through the base of the skull followed by 2D multiplanar reformats. FINDINGS: There is no intracranial mass, hemorrhage, or hydrocephalus. Calvarium is intact. Middle ears and mastoid air cells are clear.     Impression: No acute intracranial abnormality.    XR Chest 1 View    Result Date: 5/23/2023  Narrative: Comparison: None FINDINGS: No focal consolidation or pneumothorax.  There may be small bilateral pleural effusions.  Cardiomediastinal silhouette is unremarkable.         Medical Decision Making  Hyperammonemia: acute illness or injury  Amount and/or Complexity of Data Reviewed  Labs: ordered. Decision-making details documented in ED Course.  Radiology: ordered. Decision-making details documented in ED Course.  ECG/medicine tests: ordered and independent interpretation performed. Decision-making details documented in ED Course.      Patient's work-up reveals a significantly elevated ammonia.   This is consistent with her waxing and waning mental status.  After discussion regarding this finding she does admit that she stopped taking her lactulose a few days ago because it was causing her to have loose stool.  Remainder of work-up reveals her chronic anemia.  There is no evidence of CO2 retention and vital signs remained stable.  No evidence of urinary tract infection.  Remainder of electrolytes unremarkable.  Kidney and liver function unremarkable bilirubin within patient's baseline.  Chest x-ray and head CT unremarkable.  Patient's family member reports that she had to stay with her for a few days.  Patient would like to go home.  They report that they will ensure that she is taking her lactulose as prescribed.  This is felt appropriate given that she is oriented just having some waxing and waning confusion.  Patient and family acknowledge understanding of treatment, plan and agreeable to discharge with outpatient management follow-up at this time.    Final diagnoses:   Hyperammonemia       ED Disposition  ED Disposition     ED Disposition   Discharge    Condition   Stable    Comment   --             Bennie Esquivel MD  [address] [place], [place] [id number] [phone]      For lab recheck in 1 week         Medication List      Stop    multivitamin-iron-minerals-folic acid chewable tablet     vitamin D 1.25 MG (58961 UT) capsule capsule  Commonly known as: ERGOCALCIFEROL             Howard Kirby, DO  05/29/23 1506

## 2023-05-24 NOTE — ED NOTES
"Patient presents to the ED with granddaughter, who reports patient has bouts of AMS and dizziness. Patient states \"I am dizzy and confused.\"   Patient a/o x4 at this time.  "

## (undated) DEVICE — Device

## (undated) DEVICE — CONTAINER,SPECIMEN,OR STERILE,4OZ: Brand: MEDLINE

## (undated) DEVICE — GLV SURG SENSICARE GREEN W/ALOE PF LF 6.5 STRL

## (undated) DEVICE — BITEBLOCK ENDO W/STRAP 60F A/ LF DISP

## (undated) DEVICE — PK ENT LF 60

## (undated) DEVICE — 3M™ STERI-STRIP™ REINFORCED ADHESIVE SKIN CLOSURES, R1547, 1/2 IN X 4 IN (12 MM X 100 MM), 6 STRIPS/ENVELOPE: Brand: 3M™ STERI-STRIP™

## (undated) DEVICE — TBG DECLOG DIEGO ELITE MULTIDEBRIDER ST

## (undated) DEVICE — INTENDED FOR TISSUE SEPARATION, AND OTHER PROCEDURES THAT REQUIRE A SHARP SURGICAL BLADE TO PUNCTURE OR CUT.: Brand: BARD-PARKER ® CARBON RIB-BACK BLADES

## (undated) DEVICE — CANN SMPL SOFTECH BIFLO ETCO2 A/M 7FT

## (undated) DEVICE — Device: Brand: DISPOSABLE ELECTROSURGICAL SNARE

## (undated) DEVICE — TRAP SXN POLYP QUICKCATCH LF

## (undated) DEVICE — COVER,MAYO STAND,STERILE: Brand: MEDLINE

## (undated) DEVICE — TOWEL,OR,DSP,ST,BLUE,DLX,8/PK,10PK/CS: Brand: MEDLINE

## (undated) DEVICE — SPHR MARKR LOCATION CI SYS REFL 3PK 30BX

## (undated) DEVICE — GLV SURG TRIUMPH LT PF LTX 8 STRL

## (undated) DEVICE — GLV SURG TRIUMPH ORTHO W/ALOE PF LTX 6.5 STRL

## (undated) DEVICE — ANTI-FOG SOLUTION W/FOAM PAD: Brand: CARDINAL HEALTH

## (undated) DEVICE — BLD E/S SINUS SHVE DIEGO ELITE STD STR TYPE A PK/5

## (undated) DEVICE — TRAP,MUCUS SPECIMEN,40CC: Brand: MEDLINE

## (undated) DEVICE — CODMAN® SURGICAL PATTIES 1/2" X 3" (1.27CM X 7.62CM): Brand: CODMAN®

## (undated) DEVICE — GOWN,AURORA,NOREINF,RAGLAN,XL,STERILE: Brand: MEDLINE

## (undated) DEVICE — SINGLE-USE BIOPSY FORCEPS: Brand: RADIAL JAW 4

## (undated) DEVICE — SUT SILK 2/0 FS BLK 18IN 685G

## (undated) DEVICE — SOL IRRIG NACL 1000ML